# Patient Record
Sex: MALE | Race: WHITE | NOT HISPANIC OR LATINO | Employment: OTHER | URBAN - METROPOLITAN AREA
[De-identification: names, ages, dates, MRNs, and addresses within clinical notes are randomized per-mention and may not be internally consistent; named-entity substitution may affect disease eponyms.]

---

## 2021-03-03 DIAGNOSIS — Z23 NEED FOR VACCINATION: ICD-10-CM

## 2022-08-28 ENCOUNTER — APPOINTMENT (OUTPATIENT)
Dept: RADIOLOGY | Facility: MEDICAL CENTER | Age: 72
DRG: 871 | End: 2022-08-28
Attending: EMERGENCY MEDICINE
Payer: MEDICARE

## 2022-08-28 ENCOUNTER — HOSPITAL ENCOUNTER (INPATIENT)
Facility: MEDICAL CENTER | Age: 72
LOS: 2 days | DRG: 871 | End: 2022-08-30
Attending: EMERGENCY MEDICINE | Admitting: INTERNAL MEDICINE
Payer: MEDICARE

## 2022-08-28 ENCOUNTER — APPOINTMENT (OUTPATIENT)
Dept: RADIOLOGY | Facility: MEDICAL CENTER | Age: 72
DRG: 871 | End: 2022-08-28
Attending: INTERNAL MEDICINE
Payer: MEDICARE

## 2022-08-28 DIAGNOSIS — R65.20 SEPSIS WITH ENCEPHALOPATHY WITHOUT SEPTIC SHOCK, DUE TO UNSPECIFIED ORGANISM (HCC): ICD-10-CM

## 2022-08-28 DIAGNOSIS — I20.0 UNSTABLE ANGINA (HCC): ICD-10-CM

## 2022-08-28 DIAGNOSIS — A41.9 SEPSIS WITH ENCEPHALOPATHY WITHOUT SEPTIC SHOCK, DUE TO UNSPECIFIED ORGANISM (HCC): ICD-10-CM

## 2022-08-28 DIAGNOSIS — T79.6XXA TRAUMATIC RHABDOMYOLYSIS, INITIAL ENCOUNTER (HCC): ICD-10-CM

## 2022-08-28 DIAGNOSIS — G93.40 ENCEPHALOPATHY: ICD-10-CM

## 2022-08-28 DIAGNOSIS — G93.40 ACUTE ENCEPHALOPATHY: ICD-10-CM

## 2022-08-28 DIAGNOSIS — F10.11 HISTORY OF ALCOHOL ABUSE: ICD-10-CM

## 2022-08-28 DIAGNOSIS — E86.0 DEHYDRATION: ICD-10-CM

## 2022-08-28 DIAGNOSIS — G93.41 SEPSIS WITH ENCEPHALOPATHY WITHOUT SEPTIC SHOCK, DUE TO UNSPECIFIED ORGANISM (HCC): ICD-10-CM

## 2022-08-28 PROBLEM — M62.82 RHABDOMYOLYSIS: Status: ACTIVE | Noted: 2022-08-28

## 2022-08-28 LAB
ALBUMIN SERPL BCP-MCNC: 4.7 G/DL (ref 3.2–4.9)
ALBUMIN/GLOB SERPL: 1.6 G/DL
ALP SERPL-CCNC: 48 U/L (ref 30–99)
ALT SERPL-CCNC: 39 U/L (ref 2–50)
AMMONIA PLAS-SCNC: 13 UMOL/L (ref 11–45)
ANION GAP SERPL CALC-SCNC: 22 MMOL/L (ref 7–16)
ANISOCYTOSIS BLD QL SMEAR: ABNORMAL
AST SERPL-CCNC: 61 U/L (ref 12–45)
BASOPHILS # BLD AUTO: 0 % (ref 0–1.8)
BASOPHILS # BLD: 0 K/UL (ref 0–0.12)
BILIRUB SERPL-MCNC: 0.7 MG/DL (ref 0.1–1.5)
BUN SERPL-MCNC: 28 MG/DL (ref 8–22)
CALCIUM SERPL-MCNC: 9.9 MG/DL (ref 8.5–10.5)
CHLORIDE SERPL-SCNC: 106 MMOL/L (ref 96–112)
CK SERPL-CCNC: 972 U/L (ref 0–154)
CO2 SERPL-SCNC: 15 MMOL/L (ref 20–33)
CREAT SERPL-MCNC: 1.18 MG/DL (ref 0.5–1.4)
EKG IMPRESSION: NORMAL
EOSINOPHIL # BLD AUTO: 0 K/UL (ref 0–0.51)
EOSINOPHIL NFR BLD: 0 % (ref 0–6.9)
ERYTHROCYTE [DISTWIDTH] IN BLOOD BY AUTOMATED COUNT: 50.5 FL (ref 35.9–50)
ETHANOL BLD-MCNC: 14.7 MG/DL
FLUAV RNA SPEC QL NAA+PROBE: POSITIVE
FLUBV RNA SPEC QL NAA+PROBE: NEGATIVE
GFR SERPLBLD CREATININE-BSD FMLA CKD-EPI: 66 ML/MIN/1.73 M 2
GLOBULIN SER CALC-MCNC: 2.9 G/DL (ref 1.9–3.5)
GLUCOSE SERPL-MCNC: 111 MG/DL (ref 65–99)
HCT VFR BLD AUTO: 41.5 % (ref 42–52)
HGB BLD-MCNC: 14.2 G/DL (ref 14–18)
LACTATE SERPL-SCNC: 2.2 MMOL/L (ref 0.5–2)
LACTATE SERPL-SCNC: 2.6 MMOL/L (ref 0.5–2)
LYMPHOCYTES # BLD AUTO: 6.46 K/UL (ref 1–4.8)
LYMPHOCYTES NFR BLD: 38.7 % (ref 22–41)
MACROCYTES BLD QL SMEAR: ABNORMAL
MANUAL DIFF BLD: NORMAL
MCH RBC QN AUTO: 33.6 PG (ref 27–33)
MCHC RBC AUTO-ENTMCNC: 34.2 G/DL (ref 33.7–35.3)
MCV RBC AUTO: 98.1 FL (ref 81.4–97.8)
MONOCYTES # BLD AUTO: 1.82 K/UL (ref 0–0.85)
MONOCYTES NFR BLD AUTO: 10.9 % (ref 0–13.4)
MORPHOLOGY BLD-IMP: NORMAL
NEUTROPHILS # BLD AUTO: 8.42 K/UL (ref 1.82–7.42)
NEUTROPHILS NFR BLD: 50.4 % (ref 44–72)
NRBC # BLD AUTO: 0 K/UL
NRBC BLD-RTO: 0 /100 WBC
PLATELET # BLD AUTO: 257 K/UL (ref 164–446)
PLATELET BLD QL SMEAR: NORMAL
PMV BLD AUTO: 9.7 FL (ref 9–12.9)
POTASSIUM SERPL-SCNC: 4.3 MMOL/L (ref 3.6–5.5)
PROCALCITONIN SERPL-MCNC: 0.18 NG/ML
PROT SERPL-MCNC: 7.6 G/DL (ref 6–8.2)
RBC # BLD AUTO: 4.23 M/UL (ref 4.7–6.1)
RBC BLD AUTO: PRESENT
RSV RNA SPEC QL NAA+PROBE: POSITIVE
SARS-COV-2 RNA RESP QL NAA+PROBE: NOTDETECTED
SMUDGE CELLS BLD QL SMEAR: NORMAL
SODIUM SERPL-SCNC: 143 MMOL/L (ref 135–145)
SPECIMEN SOURCE: ABNORMAL
TROPONIN T SERPL-MCNC: 30 NG/L (ref 6–19)
TROPONIN T SERPL-MCNC: 41 NG/L (ref 6–19)
VIT B12 SERPL-MCNC: 307 PG/ML (ref 211–911)
WBC # BLD AUTO: 16.7 K/UL (ref 4.8–10.8)

## 2022-08-28 PROCEDURE — 93005 ELECTROCARDIOGRAM TRACING: CPT | Performed by: INTERNAL MEDICINE

## 2022-08-28 PROCEDURE — 87040 BLOOD CULTURE FOR BACTERIA: CPT

## 2022-08-28 PROCEDURE — 82550 ASSAY OF CK (CPK): CPT

## 2022-08-28 PROCEDURE — 700111 HCHG RX REV CODE 636 W/ 250 OVERRIDE (IP): Performed by: INTERNAL MEDICINE

## 2022-08-28 PROCEDURE — 36415 COLL VENOUS BLD VENIPUNCTURE: CPT

## 2022-08-28 PROCEDURE — 700111 HCHG RX REV CODE 636 W/ 250 OVERRIDE (IP): Performed by: EMERGENCY MEDICINE

## 2022-08-28 PROCEDURE — 83605 ASSAY OF LACTIC ACID: CPT | Mod: 91

## 2022-08-28 PROCEDURE — 96366 THER/PROPH/DIAG IV INF ADDON: CPT

## 2022-08-28 PROCEDURE — 80053 COMPREHEN METABOLIC PANEL: CPT

## 2022-08-28 PROCEDURE — 70450 CT HEAD/BRAIN W/O DYE: CPT

## 2022-08-28 PROCEDURE — HZ2ZZZZ DETOXIFICATION SERVICES FOR SUBSTANCE ABUSE TREATMENT: ICD-10-PCS | Performed by: EMERGENCY MEDICINE

## 2022-08-28 PROCEDURE — 96365 THER/PROPH/DIAG IV INF INIT: CPT

## 2022-08-28 PROCEDURE — 82607 VITAMIN B-12: CPT

## 2022-08-28 PROCEDURE — 770001 HCHG ROOM/CARE - MED/SURG/GYN PRIV*

## 2022-08-28 PROCEDURE — 99285 EMERGENCY DEPT VISIT HI MDM: CPT

## 2022-08-28 PROCEDURE — 96375 TX/PRO/DX INJ NEW DRUG ADDON: CPT

## 2022-08-28 PROCEDURE — 0241U HCHG SARS-COV-2 COVID-19 NFCT DS RESP RNA 4 TRGT MIC: CPT

## 2022-08-28 PROCEDURE — 96372 THER/PROPH/DIAG INJ SC/IM: CPT

## 2022-08-28 PROCEDURE — 700105 HCHG RX REV CODE 258: Performed by: INTERNAL MEDICINE

## 2022-08-28 PROCEDURE — 96367 TX/PROPH/DG ADDL SEQ IV INF: CPT

## 2022-08-28 PROCEDURE — C9803 HOPD COVID-19 SPEC COLLECT: HCPCS | Performed by: INTERNAL MEDICINE

## 2022-08-28 PROCEDURE — 85025 COMPLETE CBC W/AUTO DIFF WBC: CPT

## 2022-08-28 PROCEDURE — 80503 PATH CLIN CONSLTJ SF 5-20: CPT

## 2022-08-28 PROCEDURE — 84145 PROCALCITONIN (PCT): CPT

## 2022-08-28 PROCEDURE — 82140 ASSAY OF AMMONIA: CPT

## 2022-08-28 PROCEDURE — 85007 BL SMEAR W/DIFF WBC COUNT: CPT

## 2022-08-28 PROCEDURE — 82077 ASSAY SPEC XCP UR&BREATH IA: CPT

## 2022-08-28 PROCEDURE — 99223 1ST HOSP IP/OBS HIGH 75: CPT | Mod: AI | Performed by: INTERNAL MEDICINE

## 2022-08-28 PROCEDURE — 700101 HCHG RX REV CODE 250: Performed by: EMERGENCY MEDICINE

## 2022-08-28 PROCEDURE — 84484 ASSAY OF TROPONIN QUANT: CPT | Mod: 91

## 2022-08-28 PROCEDURE — 71045 X-RAY EXAM CHEST 1 VIEW: CPT

## 2022-08-28 RX ORDER — LORAZEPAM 2 MG/ML
1 INJECTION INTRAMUSCULAR ONCE
Status: COMPLETED | OUTPATIENT
Start: 2022-08-28 | End: 2022-08-28

## 2022-08-28 RX ORDER — LORAZEPAM 2 MG/1
4 TABLET ORAL
Status: DISCONTINUED | OUTPATIENT
Start: 2022-08-28 | End: 2022-08-30

## 2022-08-28 RX ORDER — BUDESONIDE AND FORMOTEROL FUMARATE DIHYDRATE 160; 4.5 UG/1; UG/1
2 AEROSOL RESPIRATORY (INHALATION) 2 TIMES DAILY
Status: DISCONTINUED | OUTPATIENT
Start: 2022-08-29 | End: 2022-08-30 | Stop reason: HOSPADM

## 2022-08-28 RX ORDER — LABETALOL HYDROCHLORIDE 5 MG/ML
10 INJECTION, SOLUTION INTRAVENOUS EVERY 4 HOURS PRN
Status: DISCONTINUED | OUTPATIENT
Start: 2022-08-28 | End: 2022-08-30 | Stop reason: HOSPADM

## 2022-08-28 RX ORDER — ENOXAPARIN SODIUM 100 MG/ML
40 INJECTION SUBCUTANEOUS DAILY
Status: DISCONTINUED | OUTPATIENT
Start: 2022-08-28 | End: 2022-08-30

## 2022-08-28 RX ORDER — BUDESONIDE AND FORMOTEROL FUMARATE DIHYDRATE 160; 4.5 UG/1; UG/1
2 AEROSOL RESPIRATORY (INHALATION) 2 TIMES DAILY
COMMUNITY
Start: 2022-07-27

## 2022-08-28 RX ORDER — FOLIC ACID 1 MG/1
1 TABLET ORAL DAILY
Status: DISCONTINUED | OUTPATIENT
Start: 2022-08-29 | End: 2022-08-30 | Stop reason: HOSPADM

## 2022-08-28 RX ORDER — ONDANSETRON 4 MG/1
4 TABLET, ORALLY DISINTEGRATING ORAL EVERY 4 HOURS PRN
Status: DISCONTINUED | OUTPATIENT
Start: 2022-08-28 | End: 2022-08-30 | Stop reason: HOSPADM

## 2022-08-28 RX ORDER — ONDANSETRON 2 MG/ML
4 INJECTION INTRAMUSCULAR; INTRAVENOUS EVERY 4 HOURS PRN
Status: DISCONTINUED | OUTPATIENT
Start: 2022-08-28 | End: 2022-08-30 | Stop reason: HOSPADM

## 2022-08-28 RX ORDER — SODIUM CHLORIDE, SODIUM LACTATE, POTASSIUM CHLORIDE, AND CALCIUM CHLORIDE .6; .31; .03; .02 G/100ML; G/100ML; G/100ML; G/100ML
1000 INJECTION, SOLUTION INTRAVENOUS
Status: DISCONTINUED | OUTPATIENT
Start: 2022-08-28 | End: 2022-08-30

## 2022-08-28 RX ORDER — BISACODYL 10 MG
10 SUPPOSITORY, RECTAL RECTAL
Status: DISCONTINUED | OUTPATIENT
Start: 2022-08-28 | End: 2022-08-30 | Stop reason: HOSPADM

## 2022-08-28 RX ORDER — LORAZEPAM 1 MG/1
1 TABLET ORAL EVERY 4 HOURS PRN
Status: DISCONTINUED | OUTPATIENT
Start: 2022-08-28 | End: 2022-08-30

## 2022-08-28 RX ORDER — ACETAMINOPHEN 325 MG/1
650 TABLET ORAL EVERY 6 HOURS PRN
Status: DISCONTINUED | OUTPATIENT
Start: 2022-08-28 | End: 2022-08-30 | Stop reason: HOSPADM

## 2022-08-28 RX ORDER — LORAZEPAM 0.5 MG/1
0.5 TABLET ORAL EVERY 4 HOURS PRN
Status: DISCONTINUED | OUTPATIENT
Start: 2022-08-28 | End: 2022-08-30

## 2022-08-28 RX ORDER — AMOXICILLIN 250 MG
2 CAPSULE ORAL 2 TIMES DAILY
Status: DISCONTINUED | OUTPATIENT
Start: 2022-08-29 | End: 2022-08-30 | Stop reason: HOSPADM

## 2022-08-28 RX ORDER — SODIUM CHLORIDE, SODIUM LACTATE, POTASSIUM CHLORIDE, CALCIUM CHLORIDE 600; 310; 30; 20 MG/100ML; MG/100ML; MG/100ML; MG/100ML
INJECTION, SOLUTION INTRAVENOUS CONTINUOUS
Status: DISCONTINUED | OUTPATIENT
Start: 2022-08-28 | End: 2022-08-30

## 2022-08-28 RX ORDER — POLYETHYLENE GLYCOL 3350 17 G/17G
1 POWDER, FOR SOLUTION ORAL
Status: DISCONTINUED | OUTPATIENT
Start: 2022-08-28 | End: 2022-08-30 | Stop reason: HOSPADM

## 2022-08-28 RX ORDER — LORAZEPAM 2 MG/1
2 TABLET ORAL
Status: DISCONTINUED | OUTPATIENT
Start: 2022-08-28 | End: 2022-08-30

## 2022-08-28 RX ORDER — DIAZEPAM 5 MG/ML
5 INJECTION, SOLUTION INTRAMUSCULAR; INTRAVENOUS
Status: DISCONTINUED | OUTPATIENT
Start: 2022-08-28 | End: 2022-08-30

## 2022-08-28 RX ORDER — LOSARTAN POTASSIUM 50 MG/1
100 TABLET ORAL DAILY
Status: DISCONTINUED | OUTPATIENT
Start: 2022-08-29 | End: 2022-08-30 | Stop reason: HOSPADM

## 2022-08-28 RX ADMIN — LORAZEPAM 1 MG: 2 INJECTION INTRAMUSCULAR; INTRAVENOUS at 16:22

## 2022-08-28 RX ADMIN — THIAMINE HYDROCHLORIDE: 100 INJECTION, SOLUTION INTRAMUSCULAR; INTRAVENOUS at 16:22

## 2022-08-28 RX ADMIN — SODIUM CHLORIDE, POTASSIUM CHLORIDE, SODIUM LACTATE AND CALCIUM CHLORIDE: 600; 310; 30; 20 INJECTION, SOLUTION INTRAVENOUS at 20:18

## 2022-08-28 RX ADMIN — ENOXAPARIN SODIUM 40 MG: 40 INJECTION SUBCUTANEOUS at 19:13

## 2022-08-28 RX ADMIN — THIAMINE HYDROCHLORIDE 250 MG: 100 INJECTION, SOLUTION INTRAMUSCULAR; INTRAVENOUS at 19:10

## 2022-08-28 RX ADMIN — SODIUM CHLORIDE 3 G: 900 INJECTION INTRAVENOUS at 18:33

## 2022-08-28 ASSESSMENT — LIFESTYLE VARIABLES
ON A TYPICAL DAY WHEN YOU DRINK ALCOHOL HOW MANY DRINKS DO YOU HAVE: 5
EVER FELT BAD OR GUILTY ABOUT YOUR DRINKING: NO
AVERAGE NUMBER OF DAYS PER WEEK YOU HAVE A DRINK CONTAINING ALCOHOL: 7
TOTAL SCORE: 0
AUDITORY DISTURBANCES: NOT PRESENT
TOTAL SCORE: 6
HAVE YOU EVER FELT YOU SHOULD CUT DOWN ON YOUR DRINKING: NO
CONSUMPTION TOTAL: POSITIVE
NAUSEA AND VOMITING: NO NAUSEA AND NO VOMITING
DO YOU DRINK ALCOHOL: YES
DOES PATIENT WANT TO STOP DRINKING: NO
HEADACHE, FULLNESS IN HEAD: NOT PRESENT
ANXIETY: NO ANXIETY (AT EASE)
VISUAL DISTURBANCES: NOT PRESENT
HOW MANY TIMES IN THE PAST YEAR HAVE YOU HAD 5 OR MORE DRINKS IN A DAY: 5
ORIENTATION AND CLOUDING OF SENSORIUM: DISORIENTED FOR PLACE AND / OR PERSON
PAROXYSMAL SWEATS: NO SWEAT VISIBLE
AGITATION: NORMAL ACTIVITY
HAVE PEOPLE ANNOYED YOU BY CRITICIZING YOUR DRINKING: NO
EVER HAD A DRINK FIRST THING IN THE MORNING TO STEADY YOUR NERVES TO GET RID OF A HANGOVER: NO
TREMOR: *
TOTAL SCORE: 0
TOTAL SCORE: 0

## 2022-08-28 NOTE — ED PROVIDER NOTES
"ED Provider Note    CHIEF COMPLAINT  Chief Complaint   Patient presents with    ALOC     Pt was BIBA from home after son found him down between his couch and the wall. The last time the pt was seen normal was Monday, daughter spoke with pt on Tuesday on the phone. Per son pt \"wasn't making sense.\" Pt A+ox4 on arrival but talking about \"gang bangers that broke in his sons house.\" Pt has hx of alcohol abuse and COPD.     Open Wound     Pt has sacral wound and states that its been an issue ' for awhile\"        HPI  Ady Blake is a 72 y.o. male who presents altered, had not been heard from by his family for the past 5 days, his son went to his residence where he found him on the floor between the couch and the wall.  He was conscious, had urinated, no bowel incontinence noted.  His son reports he seemed very confused, he was telling stories and saying things that were not true.  Patient offers no complaints whatsoever, no headache or neck pain or back pain or chest pain, no abdominal pain.  The patient really cannot articulate why he is here and at this time history is otherwise unobtainable.  The son reports that he is a chronic alcoholic.    REVIEW OF SYSTEMS  Negative for fever, rash, chest pain, dyspnea, abdominal pain, nausea, vomiting, diarrhea, headache, focal weakness, focal numbness, focal tingling, back pain. All other systems are negative.  Unreliable    PAST MEDICAL HISTORY  Past Medical History:   Diagnosis Date    Arthritis     knees and ankles    Back pain     Chronic airway obstruction, not elsewhere classified     Dental disorder     upper and lower    Glaucoma     Hiatus hernia syndrome     HTN (hypertension) 6/14/2015    Myocardial infarct (HCC)     Unstable angina (HCC) 6/14/2015       FAMILY HISTORY  History reviewed. No pertinent family history.    SOCIAL HISTORY  Social History     Tobacco Use    Smoking status: Every Day     Packs/day: 1.00     Years: 40.00     Pack years: 40.00     " "Types: Cigarettes    Smokeless tobacco: Never   Substance Use Topics    Alcohol use: Yes     Comment: \"BEER ALOT PLUS SCOTCH 4 PER WK\"    Drug use: Yes     Comment: \"POT ONCE IN A WHILE\"       SURGICAL HISTORY  Past Surgical History:   Procedure Laterality Date    INGUINAL HERNIA LAPAROSCOPIC  11/10/2014    Performed by John H Ganser, M.D. [facility]    SHAYY BY LAPAROSCOPY      TONSILLECTOMY         CURRENT MEDICATIONS  I personally reviewed the medication list in the charting documentation.     ALLERGIES  No Known Allergies    MEDICAL RECORD  I have reviewed patient's medical record and pertinent results are listed above.      PHYSICAL EXAM  VITAL SIGNS: BP (!) 143/96   Pulse 96   Temp 36.7 °C (98.1 °F) (Temporal)   Resp 18   Ht 1.651 m (5' 5\")   Wt 61.2 kg (135 lb)   SpO2 97%   BMI 22.47 kg/m²    Constitutional: Awake and alert, disheveled, no acute distress, appears chronically ill  HENT: Normocephalic, no obvious evidence of acute trauma.  Eyes: No scleral icterus. Normal conjunctiva   Neck: Comfortable movement without any obvious restriction in the range of motion.  Cardiovascular: Upon ascultation I appreciate a regular heart rhythm and a normal rate.   Thorax & Lungs: Normal nonlabored respirations.  Upon application of the stethoscope for auscultation I find there to be no associated chest wall tenderness.  I appreciate no wheezing, rhonchi or rales. There is normal air movement.    Abdomen: The abdomen is not visibly distended. Upon palpation, I find it to be without tenderness.  No mass appreciated.  Skin: Inspection of the skin overlying the low back reveals a very superficial erosion overlying the sacrum.  Minimal surrounding erythema.  No drainage.  Extremities/Musculoskeletal: Abrasion to the left knee.  No obvious asymmetric edema of the lower extremities  Neurologic: Awake and alert.  Disoriented.  Normal and symmetric upper and lower extremity motor and sensory function " bilaterally    DIAGNOSTIC STUDIES / PROCEDURES    LABS/EKGs  Results for orders placed or performed during the hospital encounter of 08/28/22   CBC With Differential   Result Value Ref Range    WBC 16.7 (H) 4.8 - 10.8 K/uL    RBC 4.23 (L) 4.70 - 6.10 M/uL    Hemoglobin 14.2 14.0 - 18.0 g/dL    Hematocrit 41.5 (L) 42.0 - 52.0 %    MCV 98.1 (H) 81.4 - 97.8 fL    MCH 33.6 (H) 27.0 - 33.0 pg    MCHC 34.2 33.7 - 35.3 g/dL    RDW 50.5 (H) 35.9 - 50.0 fL    Platelet Count 257 164 - 446 K/uL    MPV 9.7 9.0 - 12.9 fL    Neutrophils-Polys 50.40 44.00 - 72.00 %    Lymphocytes 38.70 22.00 - 41.00 %    Monocytes 10.90 0.00 - 13.40 %    Eosinophils 0.00 0.00 - 6.90 %    Basophils 0.00 0.00 - 1.80 %    Nucleated RBC 0.00 /100 WBC    Neutrophils (Absolute) 8.42 (H) 1.82 - 7.42 K/uL    Lymphs (Absolute) 6.46 (H) 1.00 - 4.80 K/uL    Monos (Absolute) 1.82 (H) 0.00 - 0.85 K/uL    Eos (Absolute) 0.00 0.00 - 0.51 K/uL    Baso (Absolute) 0.00 0.00 - 0.12 K/uL    NRBC (Absolute) 0.00 K/uL    Anisocytosis 1+     Macrocytosis 1+    Comp Metabolic Panel   Result Value Ref Range    Sodium 143 135 - 145 mmol/L    Potassium 4.3 3.6 - 5.5 mmol/L    Chloride 106 96 - 112 mmol/L    Co2 15 (L) 20 - 33 mmol/L    Anion Gap 22.0 (H) 7.0 - 16.0    Glucose 111 (H) 65 - 99 mg/dL    Bun 28 (H) 8 - 22 mg/dL    Creatinine 1.18 0.50 - 1.40 mg/dL    Calcium 9.9 8.5 - 10.5 mg/dL    AST(SGOT) 61 (H) 12 - 45 U/L    ALT(SGPT) 39 2 - 50 U/L    Alkaline Phosphatase 48 30 - 99 U/L    Total Bilirubin 0.7 0.1 - 1.5 mg/dL    Albumin 4.7 3.2 - 4.9 g/dL    Total Protein 7.6 6.0 - 8.2 g/dL    Globulin 2.9 1.9 - 3.5 g/dL    A-G Ratio 1.6 g/dL   Lactic Acid   Result Value Ref Range    Lactic Acid 2.6 (H) 0.5 - 2.0 mmol/L   Troponin   Result Value Ref Range    Troponin T 41 (H) 6 - 19 ng/L   CREATINE KINASE   Result Value Ref Range    CPK Total 972 (H) 0 - 154 U/L   DIAGNOSTIC ALCOHOL   Result Value Ref Range    Diagnostic Alcohol 14.7 (H) <10.1 mg/dL   ESTIMATED GFR   Result  Value Ref Range    GFR (CKD-EPI) 66 >60 mL/min/1.73 m 2   PLATELET ESTIMATE   Result Value Ref Range    Plt Estimation Normal    MORPHOLOGY   Result Value Ref Range    RBC Morphology Present     Smudge Cells Moderate    PERIPHERAL SMEAR REVIEW   Result Value Ref Range    Peripheral Smear Review see below    DIFFERENTIAL MANUAL   Result Value Ref Range    Manual Diff Status PERFORMED         RADIOLOGY  DX-CHEST-LIMITED (1 VIEW)   Final Result      1.  No acute cardiac or pulmonary abnormalities are identified.      CT-HEAD W/O   Final Result         1. No acute intracranial abnormality. No evidence of acute intracranial hemorrhage or mass lesion.                           COURSE & MEDICAL DECISION MAKING  I have reviewed any medical record information, laboratory studies and radiographic results as noted above.  Differential diagnoses includes: Encephalopathy, intoxication, ICH, dehydration, lites abnormalities, anemia rhabdomyolysis    Encounter Summary: This is a very pleasant 72 y.o. male who unfortunately required evaluation in the emergency department today with apparent delirium, chronic alcoholic, found on the floor by his son after not being heard from in about 5 days.  On exam he is clearly delirious, he seems to be confabulating, really has no evidence of acute trauma, no other focal findings on exam other than a area of superficial breakdown overlying his sacrum.  Head CT will be obtained.  Blood work will be obtained.  We will treated with a detox bag including thiamine as I am concerned about the possibility of Warnicke's encephalopathy.  Ultimately the patient will require hospitalization for further evaluation and care ------- the evaluation here reveals a mild elevation in the alcohol level, leukocytosis, minimal troponin elevation, and elevation in the BUN as well as a essentially mild elevation in his CPK.  At this point, he has being treated with a detox bag, he is being admitted to the hospital  for further evaluation      DISPOSITION: Admit in guarded condition      FINAL IMPRESSION  1. Acute encephalopathy    2. Dehydration    3. History of alcohol abuse           This dictation was created using voice recognition software. The accuracy of the dictation is limited to the abilities of the software. I expect there may be some errors of grammar and possibly content. The nursing notes were reviewed and certain aspects of this information were incorporated into this note.    Electronically signed by: Raymond Georges M.D., 8/28/2022 3:30 PM

## 2022-08-28 NOTE — ED NOTES
Med rec updated and complete. Allergies reviewed.  Per phone call to home pharmacy and interview with family at bedside.  Family is unsure of last doses taken. Pt is unable to participate in an interview at this time.        Home pharmacy [facility]  [phone]

## 2022-08-28 NOTE — ED TRIAGE NOTES
"Chief Complaint   Patient presents with    ALOC     Pt was BIBA from home after son found him down between his couch and the wall. The last time the pt was seen normal was Monday, daughter spoke with pt on Tuesday on the phone. Per son pt \"wasn't making sense.\" Pt A+ox4 on arrival but talking about \"gang bangers that broke in his sons house.\" Pt has hx of alcohol abuse and COPD.     Open Wound     Pt has sacral wound and states that its been an issue ' for awhile\"          Pt BIBA from home for above complaint. Pt A+Ox4 on arrival. EMS did not give any medications.     BP (!) 143/96   Pulse 96   Temp 36.7 °C (98.1 °F) (Temporal)   Resp 18   Ht 1.651 m (5' 5\")   Wt 61.2 kg (135 lb)   SpO2 97%     "

## 2022-08-28 NOTE — H&P
"Hospital Medicine History & Physical Note    Date of Service  8/28/2022    Primary Care Physician  Mark Benito P.A.-C.    Consultants  None    Code Status  Full    Chief Complaint  Chief Complaint   Patient presents with    ALOC     Pt was BIBA from home after son found him down between his couch and the wall. The last time the pt was seen normal was Monday, daughter spoke with pt on Tuesday on the phone. Per son pt \"wasn't making sense.\" Pt A+ox4 on arrival but talking about \"gang bangers that broke in his sons house.\" Pt has hx of alcohol abuse and COPD.     Open Wound     Pt has sacral wound and states that its been an issue ' for awhile\"        History of Presenting Illness  Ady Blake is a 72 y.o. male who presented 8/28/2022 with alcohol abuse and COPD who was found down by family with confusion. He was telling them stories that did not make sense.  Labs show mild rhabdo. Alcohol level is positive. CTH neg for acute pathology  Patient is asleep at bedside, he as given ativan.  Daughter and son said they last spoke with him Tuesday and he was fine then. Patient did mention to them he feels like his equilibrium is off. He \"drinks beer like water\" as well as shots. He does not go any day without drinking.    I discussed the plan of care with family.    Review of Systems  Review of Systems   Unable to perform ROS: Medical condition (confused, somnolent)     Past Medical History   has a past medical history of Arthritis, Back pain, Chronic airway obstruction, not elsewhere classified, Dental disorder, Glaucoma, Hiatus hernia syndrome, HTN (hypertension) (6/14/2015), Myocardial infarct (HCC), and Unstable angina (HCC) (6/14/2015).    Surgical History   has a past surgical history that includes tonsillectomy; emily by laparoscopy; and inguinal hernia laparoscopic (11/10/2014).     Family History  Mom - DM    Social History   reports that he has been smoking cigarettes. He has a 40.00 pack-year smoking " history. He has never used smokeless tobacco. He reports current alcohol use. He reports current drug use.    Allergies  No Known Allergies    Medications  Prior to Admission Medications   Prescriptions Last Dose Informant Patient Reported? Taking?   B Complex Vitamins (VITAMIN B COMPLEX) TABS  Patient Yes No   Sig: Take 1 Tab by mouth every day.   albuterol (VENTOLIN OR PROVENTIL) 108 (90 BASE) MCG/ACT AERS inhalation aerosol  Patient Yes No   Sig: Inhale 2 Puffs by mouth every 6 hours as needed for Shortness of Breath.   glucosamine Sulfate 500 MG CAPS  Patient Yes No   Sig: Take 500 mg by mouth every day.   losartan (COZAAR) 100 MG TABS  Patient Yes No   Sig: Take 100 mg by mouth every day.   tiotropium (SPIRIVA HANDIHALER) 18 MCG CAPS  Patient Yes No   Sig: Inhale 18 mcg by mouth every day.      Facility-Administered Medications: None       Physical Exam  Temp:  [36.7 °C (98.1 °F)] 36.7 °C (98.1 °F)  Pulse:  [62-99] 62  Resp:  [16-18] 16  BP: (143-145)/(83-96) 145/83  SpO2:  [96 %-97 %] 96 %  Blood Pressure : (!) 145/83   Temperature: 36.7 °C (98.1 °F)   Pulse: 62   Respiration: 16   Pulse Oximetry: 97 %       Physical Exam  Constitutional:       Appearance: He is ill-appearing and diaphoretic.      Comments: drowsy   HENT:      Head: Normocephalic.      Mouth/Throat:      Mouth: Mucous membranes are dry.   Eyes:      General:         Right eye: No discharge.         Left eye: No discharge.      Pupils: Pupils are equal, round, and reactive to light.   Cardiovascular:      Rate and Rhythm: Normal rate and regular rhythm.   Pulmonary:      Breath sounds: Rhonchi present. No wheezing.      Comments: tachypneic  Abdominal:      General: There is no distension.      Palpations: Abdomen is soft.      Tenderness: There is no abdominal tenderness. There is no guarding or rebound.   Musculoskeletal:      Cervical back: Neck supple. No rigidity.      Right lower leg: No edema.      Left lower leg: No edema.   Skin:      General: Skin is warm.   Neurological:      Comments: Disoriented, does not follow commands, weakly moves UE and LE       Laboratory:  Recent Labs     08/28/22  1425   WBC 16.7*   RBC 4.23*   HEMOGLOBIN 14.2   HEMATOCRIT 41.5*   MCV 98.1*   MCH 33.6*   MCHC 34.2   RDW 50.5*   PLATELETCT 257   MPV 9.7     Recent Labs     08/28/22  1425   SODIUM 143   POTASSIUM 4.3   CHLORIDE 106   CO2 15*   GLUCOSE 111*   BUN 28*   CREATININE 1.18   CALCIUM 9.9     Recent Labs     08/28/22  1425   ALTSGPT 39   ASTSGOT 61*   ALKPHOSPHAT 48   TBILIRUBIN 0.7   GLUCOSE 111*         No results for input(s): NTPROBNP in the last 72 hours.      Recent Labs     08/28/22  1425   TROPONINT 41*       Imaging:  DX-CHEST-LIMITED (1 VIEW)   Final Result      1.  No acute cardiac or pulmonary abnormalities are identified.      CT-HEAD W/O   Final Result         1. No acute intracranial abnormality. No evidence of acute intracranial hemorrhage or mass lesion.                           Assessment/Plan:  Justification for Admission Status  I anticipate this patient will require at least two midnights for appropriate medical management, necessitating inpatient admission because encephalopathy and sepsis workup    Patient will need a  bed on EMERGENCY service .  The need is secondary to sepsis.    * Encephalopathy- (present on admission)  Assessment & Plan  Metabolic  CTH neg for acute pathology  Is intoxicated, CIWA protocol ordered  Check Vit B1 and B12, ammonia level  Workup for sepsis  Possible confabulation? IV thiamine ordered  Consider MRI brain if he does not improve    Rhabdomyolysis  Assessment & Plan  Renal function ok  Trend CPK, IVF ordered    Sepsis (HCC)  Assessment & Plan  This is Sepsis Present on admission  SIRS criteria identified on my evaluation include: Fever, with temperature greater than 101 deg F and Tachycardia, with heart rate greater than 90 BPM  Source is urine vs lung. No sign of meningitis/rigid neck  Sepsis protocol  initiated  Fluid resuscitation ordered per protocol  Crystalloid Fluid Administration: Fluid resuscitation ordered per standard protocol - 30 mL/kg per current or ideal body weight  IV antibiotics as appropriate for source of sepsis  Reassessment: I have reassessed the patient's hemodynamic status  Lactic 2.6, repeat pending  Check Blood and urine cultures  CXR is neg but rales on exam, possible aspiration. Screen for COVID/flu  Unasyn ordered    COPD (chronic obstructive pulmonary disease) (HCC)- (present on admission)  Assessment & Plan  Cont spiriva and symbicort    Alcohol abuse- (present on admission)  Assessment & Plan  High risk for alcohol withdrawal  CIWA  Folate, MVI, thiamine    HTN (hypertension)- (present on admission)  Assessment & Plan  Hypertensive  Cont losartan when able  IV PRN ordered      VTE prophylaxis: enoxaparin ppx

## 2022-08-29 PROBLEM — J40 BRONCHITIS: Status: ACTIVE | Noted: 2022-08-29

## 2022-08-29 PROBLEM — F17.200 TOBACCO DEPENDENCE: Status: ACTIVE | Noted: 2022-08-29

## 2022-08-29 LAB
ALBUMIN SERPL BCP-MCNC: 3.5 G/DL (ref 3.2–4.9)
ALBUMIN/GLOB SERPL: 1.5 G/DL
ALP SERPL-CCNC: 39 U/L (ref 30–99)
ALT SERPL-CCNC: 32 U/L (ref 2–50)
ANION GAP SERPL CALC-SCNC: 14 MMOL/L (ref 7–16)
ANISOCYTOSIS BLD QL SMEAR: ABNORMAL
AST SERPL-CCNC: 42 U/L (ref 12–45)
BASOPHILS # BLD AUTO: 0 % (ref 0–1.8)
BASOPHILS # BLD: 0 K/UL (ref 0–0.12)
BILIRUB SERPL-MCNC: 0.7 MG/DL (ref 0.1–1.5)
BUN SERPL-MCNC: 24 MG/DL (ref 8–22)
CALCIUM SERPL-MCNC: 9 MG/DL (ref 8.5–10.5)
CHLORIDE SERPL-SCNC: 108 MMOL/L (ref 96–112)
CK SERPL-CCNC: 650 U/L (ref 0–154)
CO2 SERPL-SCNC: 21 MMOL/L (ref 20–33)
CREAT SERPL-MCNC: 0.83 MG/DL (ref 0.5–1.4)
EOSINOPHIL # BLD AUTO: 0.11 K/UL (ref 0–0.51)
EOSINOPHIL NFR BLD: 0.9 % (ref 0–6.9)
ERYTHROCYTE [DISTWIDTH] IN BLOOD BY AUTOMATED COUNT: 50.4 FL (ref 35.9–50)
GFR SERPLBLD CREATININE-BSD FMLA CKD-EPI: 93 ML/MIN/1.73 M 2
GLOBULIN SER CALC-MCNC: 2.4 G/DL (ref 1.9–3.5)
GLUCOSE SERPL-MCNC: 89 MG/DL (ref 65–99)
HCT VFR BLD AUTO: 37.7 % (ref 42–52)
HGB BLD-MCNC: 12.9 G/DL (ref 14–18)
LYMPHOCYTES # BLD AUTO: 6.46 K/UL (ref 1–4.8)
LYMPHOCYTES NFR BLD: 51.3 % (ref 22–41)
MACROCYTES BLD QL SMEAR: ABNORMAL
MANUAL DIFF BLD: NORMAL
MCH RBC QN AUTO: 33.8 PG (ref 27–33)
MCHC RBC AUTO-ENTMCNC: 34.2 G/DL (ref 33.7–35.3)
MCV RBC AUTO: 98.7 FL (ref 81.4–97.8)
MICROCYTES BLD QL SMEAR: ABNORMAL
MONOCYTES # BLD AUTO: 0.66 K/UL (ref 0–0.85)
MONOCYTES NFR BLD AUTO: 5.2 % (ref 0–13.4)
MORPHOLOGY BLD-IMP: NORMAL
NEUTROPHILS # BLD AUTO: 5.37 K/UL (ref 1.82–7.42)
NEUTROPHILS NFR BLD: 42.6 % (ref 44–72)
NRBC # BLD AUTO: 0 K/UL
NRBC BLD-RTO: 0 /100 WBC
PATH REV: NORMAL
PATH REV: NORMAL
PLATELET # BLD AUTO: 201 K/UL (ref 164–446)
PLATELET BLD QL SMEAR: NORMAL
PMV BLD AUTO: 9.6 FL (ref 9–12.9)
POTASSIUM SERPL-SCNC: 3.5 MMOL/L (ref 3.6–5.5)
PROT SERPL-MCNC: 5.9 G/DL (ref 6–8.2)
RBC # BLD AUTO: 3.82 M/UL (ref 4.7–6.1)
RBC BLD AUTO: PRESENT
SMUDGE CELLS BLD QL SMEAR: NORMAL
SODIUM SERPL-SCNC: 143 MMOL/L (ref 135–145)
WBC # BLD AUTO: 12.6 K/UL (ref 4.8–10.8)

## 2022-08-29 PROCEDURE — 92610 EVALUATE SWALLOWING FUNCTION: CPT

## 2022-08-29 PROCEDURE — 36415 COLL VENOUS BLD VENIPUNCTURE: CPT

## 2022-08-29 PROCEDURE — A9270 NON-COVERED ITEM OR SERVICE: HCPCS | Performed by: INTERNAL MEDICINE

## 2022-08-29 PROCEDURE — 770001 HCHG ROOM/CARE - MED/SURG/GYN PRIV*

## 2022-08-29 PROCEDURE — 700102 HCHG RX REV CODE 250 W/ 637 OVERRIDE(OP): Performed by: INTERNAL MEDICINE

## 2022-08-29 PROCEDURE — 82550 ASSAY OF CK (CPK): CPT

## 2022-08-29 PROCEDURE — 85025 COMPLETE CBC W/AUTO DIFF WBC: CPT

## 2022-08-29 PROCEDURE — 700105 HCHG RX REV CODE 258: Performed by: INTERNAL MEDICINE

## 2022-08-29 PROCEDURE — 700111 HCHG RX REV CODE 636 W/ 250 OVERRIDE (IP): Performed by: INTERNAL MEDICINE

## 2022-08-29 PROCEDURE — 97166 OT EVAL MOD COMPLEX 45 MIN: CPT

## 2022-08-29 PROCEDURE — 51798 US URINE CAPACITY MEASURE: CPT

## 2022-08-29 PROCEDURE — 97162 PT EVAL MOD COMPLEX 30 MIN: CPT

## 2022-08-29 PROCEDURE — 80053 COMPREHEN METABOLIC PANEL: CPT

## 2022-08-29 PROCEDURE — 99233 SBSQ HOSP IP/OBS HIGH 50: CPT | Performed by: INTERNAL MEDICINE

## 2022-08-29 PROCEDURE — 85007 BL SMEAR W/DIFF WBC COUNT: CPT

## 2022-08-29 RX ORDER — OSELTAMIVIR PHOSPHATE 75 MG/1
75 CAPSULE ORAL 2 TIMES DAILY
Status: DISCONTINUED | OUTPATIENT
Start: 2022-08-29 | End: 2022-08-30 | Stop reason: HOSPADM

## 2022-08-29 RX ADMIN — THIAMINE HYDROCHLORIDE 250 MG: 100 INJECTION, SOLUTION INTRAMUSCULAR; INTRAVENOUS at 14:00

## 2022-08-29 RX ADMIN — DOCUSATE SODIUM 50 MG AND SENNOSIDES 8.6 MG 2 TABLET: 8.6; 5 TABLET, FILM COATED ORAL at 18:12

## 2022-08-29 RX ADMIN — SODIUM CHLORIDE 3 G: 900 INJECTION INTRAVENOUS at 18:13

## 2022-08-29 RX ADMIN — THERA TABS 1 TABLET: TAB at 06:10

## 2022-08-29 RX ADMIN — ENOXAPARIN SODIUM 40 MG: 40 INJECTION SUBCUTANEOUS at 18:12

## 2022-08-29 RX ADMIN — SODIUM CHLORIDE 3 G: 900 INJECTION INTRAVENOUS at 05:51

## 2022-08-29 RX ADMIN — SODIUM CHLORIDE 3 G: 900 INJECTION INTRAVENOUS at 00:46

## 2022-08-29 RX ADMIN — SODIUM CHLORIDE 3 G: 900 INJECTION INTRAVENOUS at 12:25

## 2022-08-29 RX ADMIN — SODIUM CHLORIDE, POTASSIUM CHLORIDE, SODIUM LACTATE AND CALCIUM CHLORIDE: 600; 310; 30; 20 INJECTION, SOLUTION INTRAVENOUS at 08:53

## 2022-08-29 RX ADMIN — LOSARTAN POTASSIUM 100 MG: 50 TABLET, FILM COATED ORAL at 06:09

## 2022-08-29 RX ADMIN — FOLIC ACID 1 MG: 1 TABLET ORAL at 06:10

## 2022-08-29 RX ADMIN — OSELTAMIVIR PHOSPHATE 75 MG: 75 CAPSULE ORAL at 12:34

## 2022-08-29 RX ADMIN — DOCUSATE SODIUM 50 MG AND SENNOSIDES 8.6 MG 2 TABLET: 8.6; 5 TABLET, FILM COATED ORAL at 06:11

## 2022-08-29 ASSESSMENT — LIFESTYLE VARIABLES
NAUSEA AND VOMITING: NO NAUSEA AND NO VOMITING
VISUAL DISTURBANCES: NOT PRESENT
ORIENTATION AND CLOUDING OF SENSORIUM: CANNOT DO SERIAL ADDITIONS OR IS UNCERTAIN ABOUT DATE
PAROXYSMAL SWEATS: NO SWEAT VISIBLE
ANXIETY: NO ANXIETY (AT EASE)
TREMOR: NO TREMOR
TOTAL SCORE: 4
PAROXYSMAL SWEATS: NO SWEAT VISIBLE
PAROXYSMAL SWEATS: NO SWEAT VISIBLE
NAUSEA AND VOMITING: NO NAUSEA AND NO VOMITING
PAROXYSMAL SWEATS: NO SWEAT VISIBLE
VISUAL DISTURBANCES: NOT PRESENT
ANXIETY: NO ANXIETY (AT EASE)
PAROXYSMAL SWEATS: NO SWEAT VISIBLE
VISUAL DISTURBANCES: NOT PRESENT
NAUSEA AND VOMITING: NO NAUSEA AND NO VOMITING
TOTAL SCORE: 3
HEADACHE, FULLNESS IN HEAD: NOT PRESENT
AGITATION: NORMAL ACTIVITY
VISUAL DISTURBANCES: MILD SENSITIVITY
AUDITORY DISTURBANCES: NOT PRESENT
AGITATION: NORMAL ACTIVITY
TOTAL SCORE: 2
ORIENTATION AND CLOUDING OF SENSORIUM: CANNOT DO SERIAL ADDITIONS OR IS UNCERTAIN ABOUT DATE
TOTAL SCORE: 3
NAUSEA AND VOMITING: NO NAUSEA AND NO VOMITING
HEADACHE, FULLNESS IN HEAD: NOT PRESENT
TREMOR: TREMOR NOT VISIBLE BUT CAN BE FELT, FINGERTIP TO FINGERTIP
ORIENTATION AND CLOUDING OF SENSORIUM: CANNOT DO SERIAL ADDITIONS OR IS UNCERTAIN ABOUT DATE
ORIENTATION AND CLOUDING OF SENSORIUM: CANNOT DO SERIAL ADDITIONS OR IS UNCERTAIN ABOUT DATE
TREMOR: NO TREMOR
AGITATION: *
TREMOR: NO TREMOR
HEADACHE, FULLNESS IN HEAD: NOT PRESENT
AUDITORY DISTURBANCES: NOT PRESENT
ANXIETY: NO ANXIETY (AT EASE)
ANXIETY: NO ANXIETY (AT EASE)
ORIENTATION AND CLOUDING OF SENSORIUM: ORIENTED AND CAN DO SERIAL ADDITIONS
VISUAL DISTURBANCES: NOT PRESENT
HEADACHE, FULLNESS IN HEAD: NOT PRESENT
NAUSEA AND VOMITING: NO NAUSEA AND NO VOMITING
AGITATION: SOMEWHAT MORE THAN NORMAL ACTIVITY
AUDITORY DISTURBANCES: NOT PRESENT
AUDITORY DISTURBANCES: NOT PRESENT
TREMOR: *
TOTAL SCORE: 1
HEADACHE, FULLNESS IN HEAD: NOT PRESENT
AGITATION: SOMEWHAT MORE THAN NORMAL ACTIVITY
ANXIETY: NO ANXIETY (AT EASE)
AUDITORY DISTURBANCES: NOT PRESENT

## 2022-08-29 ASSESSMENT — COGNITIVE AND FUNCTIONAL STATUS - GENERAL
MOVING FROM LYING ON BACK TO SITTING ON SIDE OF FLAT BED: UNABLE
EATING MEALS: A LITTLE
MOBILITY SCORE: 10
TURNING FROM BACK TO SIDE WHILE IN FLAT BAD: UNABLE
MOVING TO AND FROM BED TO CHAIR: UNABLE
DRESSING REGULAR LOWER BODY CLOTHING: A LOT
PERSONAL GROOMING: A LITTLE
SUGGESTED CMS G CODE MODIFIER MOBILITY: CL
TOILETING: A LOT
STANDING UP FROM CHAIR USING ARMS: A LITTLE
DAILY ACTIVITIY SCORE: 15
CLIMB 3 TO 5 STEPS WITH RAILING: TOTAL
HELP NEEDED FOR BATHING: A LOT
WALKING IN HOSPITAL ROOM: A LITTLE
SUGGESTED CMS G CODE MODIFIER DAILY ACTIVITY: CK
DRESSING REGULAR UPPER BODY CLOTHING: A LITTLE

## 2022-08-29 ASSESSMENT — ACTIVITIES OF DAILY LIVING (ADL): TOILETING: INDEPENDENT

## 2022-08-29 ASSESSMENT — GAIT ASSESSMENTS
DEVIATION: OTHER (COMMENT)
ASSISTIVE DEVICE: FRONT WHEEL WALKER
GAIT LEVEL OF ASSIST: MINIMAL ASSIST
DISTANCE (FEET): 20

## 2022-08-29 ASSESSMENT — FIBROSIS 4 INDEX: FIB4 SCORE: 2.62

## 2022-08-29 NOTE — ASSESSMENT & PLAN NOTE
"This is Sepsis Present on admission  SIRS criteria identified on my evaluation include: Fever, with temperature greater than 101 deg F and Tachycardia, with heart rate greater than 90 BPM  Source is urine vs lung. No sign of meningitis/rigid neck  Sepsis protocol initiated  Fluid resuscitation ordered per protocol  Crystalloid Fluid Administration: Fluid resuscitation ordered per standard protocol - 30 mL/kg per current or ideal body weight  IV antibiotics as appropriate for source of sepsis  Reassessment: I have reassessed the patient's hemodynamic status  Lactic 2.6, repeat pending  Check Blood and urine cultures  CXR is neg but rales on exam, possible aspiration. Screen for COVID/flu  Unasyn ordered\"    Procalc negative  When able to take PO deescalate to Augmentin  "

## 2022-08-29 NOTE — PROGRESS NOTES
ISOLATION PRECAUTIONS EDUCATION    Educated PATIENT, FAMILY, S.O: patient and family member on isolation for INFLUENZA and OTHER.    Educated on reason for isolation, how the infection may be transmitted, and how to help prevent transmission to others. Educated precautions involves staff and visitors wearing PPE, following Standard Precautions and performing meticulous hand hygiene in order to prevent transmission of infection.     Droplet Precautions: Educated that Droplet Precautions involves staff and visitors wearing PPE to include a surgical mask when in the patient room.     In addition, educated that they may leave their room, but prior to exiting the patient room each time, the patient needs to have on a fresh patient gown, a surgical mask must be worn by the patient while out of the patient room, and perform hand hygiene immediately prior to exiting the room.     Patient transport and mobilization on unit  Educated that they may leave their room, but prior to exiting, the patient needs to have on a fresh patient gown, ensure the potentially infectious area is covered, performing appropriate hand hygiene immediately prior to exiting the room.

## 2022-08-29 NOTE — ASSESSMENT & PLAN NOTE
I spent 4 minutes, discussing tobacco dependence and cardiac as well as pulmonary risk. Smoking cessation instructions. Code 52952   Daughter understands.

## 2022-08-29 NOTE — THERAPY
Physical Therapy   Initial Evaluation     Patient Name: Ady Blake  Age:  72 y.o., Sex:  male  Medical Record #: [id number]  Today's Date: 8/29/2022     Precautions  Precautions: Fall Risk  Comments: CIWA, hard of hearing    Assessment  Patient is 72 y.o. male presents after being found down, and admitted for mild rhabdo, encephalopathy, sepsis, Influenza A, RSV, and ETOH w/d. Pt demonstrates notable generalized weakness, as well as BUE/BLE tremors resulting in instability and unsteadiness. Pt requires min-modA for functional tasks, and only ambulated 20' this date w/FWW. Anticipate improvements in functional mobility during hospitalization, and pt's family endorsed being supportive, but needing to determine amount of support able to provide. With progress, anticipate pt may be able to progress to returning home with family support and HHPT; however, if progress remains limited and in the absence of family support, pt may require a short SNF stay prior to returning home alone.     Plan    Recommend Physical Therapy 3 times per week until therapy goals are met for the following treatments:  Bed Mobility, Gait Training, Neuro Re-Education / Balance, Self Care/Home Evaluation, Therapeutic Activities, and Therapeutic Exercises    DC Equipment Recommendations: Front-Wheel Walker  Discharge Recommendations: Recommend home health for continued physical therapy services (pending progress)       Subjective  Pt denied lightheadedness/dizziness this date. Pt agreeable to use the FWW with walking.      Objective       08/29/22 1121   Charge Group   PT Evaluation PT Evaluation Mod   Total Time Spent   PT Total Time Yes   PT Evaluation Time Spent (Mins) 17   PT Total Time Spent (Calculated) 17   Initial Contact Note    Initial Contact Note Order Received and Verified, Physical Therapy Evaluation in Progress with Full Report to Follow.   Precautions   Precautions Fall Risk   Comments CIWA, hard of hearing   Vitals   Pulse  68   Pulse Oximetry 98 %   O2 Delivery Device None - Room Air   Pain   Pain Scales 0 to 10 Scale    Pain 0 - 10 Group   Location Buttock;Back   Pain Rating Scale (NPRS) 3   Prior Living Situation   Prior Services Home-Independent   Housing / Facility 1 Story House   Steps Into Home 1   Steps In Home 0   Bathroom Set up Bathtub / Shower Combination   Equipment Owned Single Point Cane   Lives with - Patient's Self Care Capacity Alone and Able to Care For Self   Comments IND with driving, dressing, bathing, cooking, cleaning ADLs. Pt's daughter reports able to provide assistance upon D/C, and in the process of speaking with sibling regarding amout of assistance. Willing to consider having pt stay at their homes.   Prior Level of Functional Mobility   Bed Mobility Independent   Transfer Status Independent   Ambulation Independent  (Furniture cruises at home)   Distance Ambulation (Feet)   (community)   Assistive Devices Used None  (pt's daughter reports getting a cane for patient, but he refuses to use it)   History of Falls   History of Falls Yes  (Frequent falls reported due to impaired hearing and equilibrium)   Cognition    Comments alert. Disgruntled but cooperative with encouragement   Strength Lower Body   Lower Body Strength  WDL   Sensation Lower Body   Lower Extremity Sensation   WDL   Lower Body Muscle Tone   Lower Body Muscle Tone  WDL   Neurological Concerns   Neurological Concerns Yes   Comments Moderately tremulous BUE/BLE this date   Gait Analysis   Gait Level Of Assist Minimal Assist   Assistive Device Front Wheel Walker   Distance (Feet) 20   # of Times Distance was Traveled 1   Deviation Other (Comment)  (Moderate instability/difficulty with stepping sequence due to weakness/incoordination.)   Bed Mobility    Supine to Sit Moderate Assist  (assist trunk upright)   Scooting Contact Guard Assist   Functional Mobility   Sit to Stand Minimal Assist  (FWW)   How much difficulty does the patient currently  have...   Turning over in bed (including adjusting bedclothes, sheets and blankets)? 1   Sitting down on and standing up from a chair with arms (e.g., wheelchair, bedside commode, etc.) 1   Moving from lying on back to sitting on the side of the bed? 1   How much help from another person does the patient currently need...   Moving to and from a bed to a chair (including a wheelchair)? 3   Need to walk in a hospital room? 3   Climbing 3-5 steps with a railing? 1   6 clicks Mobility Score 10   Short Term Goals    Short Term Goal # 1 Pt will transfer supine<>sit SBA in 6 visits to improve bed mobility.   Short Term Goal # 2 Pt will transfer STS SBA w/LRAD in 6 visits to improve transfer ability.   Short Term Goal # 3 Pt will ambulate 100' SBA w/LRAD in 6 visits to improve access to environment   Education Group   Education Provided Role of Physical Therapist;Gait Training;Use of Assistive Device   Role of Physical Therapist Patient Response Verbal Demonstration   Gait Training Patient Response Reinforcement Needed   Use of Assistive Device Patient Response Reinforcement Needed   Problem List    Problems Impaired Bed Mobility;Impaired Transfers;Impaired Ambulation;Impaired Coordination;Impaired Balance;Decreased Activity Tolerance;Motor Planning / Sequencing   Anticipated Discharge Equipment and Recommendations   DC Equipment Recommendations Front-Wheel Walker   Discharge Recommendations Recommend home health for continued physical therapy services  (pending progress)   Interdisciplinary Plan of Care Collaboration   IDT Collaboration with  Nursing;Occupational Therapist   Patient Position at End of Therapy Seated;Chair Alarm On;Call Light within Reach;Tray Table within Reach;Family / Friend in Room   Collaboration Comments Collaborated with OT for eval given co-morbidities and impaired activity tolerance. Updated RN regarding performance   Session Information   Date / Session Number  8/29- 1(1/3, 9/4)

## 2022-08-29 NOTE — ASSESSMENT & PLAN NOTE
"Metabolic  CTH neg for acute pathology  Is intoxicated, CIWA protocol ordered  Check Vit B1 and B12, ammonia level  Workup for sepsis  Possible confabulation? IV thiamine ordered  Consider MRI brain if he does not improve\"    Speech to see if he can swallow, low threshold for Cortrak  Detox protocol  Treat flu/RSV  "

## 2022-08-29 NOTE — CARE PLAN
The patient is Stable - Low risk of patient condition declining or worsening    Shift Goals  Clinical Goals: WA  Patient Goals: rest  Family Goals: n/a    Progress made toward(s) clinical / shift goals:    Problem: Knowledge Deficit - Standard  Goal: Patient and family/care givers will demonstrate understanding of plan of care, disease process/condition, diagnostic tests and medications  Outcome: Progressing     Problem: Optimal Care for Alcohol Withdrawal  Goal: Optimal Care for the alcohol withdrawal patient  Outcome: Progressing     Problem: Seizure Precautions  Goal: Implementation of seizure precautions  Outcome: Progressing     Problem: Lifestyle Changes  Goal: Patient's ability to identify lifestyle changes and available resources to help reduce recurrence of condition will improve  Outcome: Progressing     Problem: Psychosocial  Goal: Patient's level of anxiety will decrease  Outcome: Progressing  Goal: Spiritual and cultural needs incorporated into hospitalization  Outcome: Progressing     Problem: Risk for Aspiration  Goal: Patient's risk for aspiration will be absent or decrease  Outcome: Progressing     Problem: Hemodynamics  Goal: Patient's hemodynamics, fluid balance and neurologic status will be stable or improve  Outcome: Progressing       Patient is not progressing towards the following goals:

## 2022-08-29 NOTE — THERAPY
"Speech Language Pathology   Clinical Swallow Evaluation     Patient Name: Ady Blake  AGE:  72 y.o., SEX:  male  Medical Record #: [id number]  Today's Date: 8/29/2022     Precautions  Precautions: (P) Fall Risk, Swallow Precautions ( See Comments), Other (See Comments) (reflux precautions)  Comments: (P) BALTAZAR MOTA    HPI:71 YO male admitted 8/28 2/2 being found down with confusion. PMHx: alcohol abuse, COPD. CMHx: encephalopathy, rhabdomyolysis, sepsis, COPD, alcohol abuse, HTN. Head CT 8/28 \"No acute intracranial abnormality. No evidence of acute intracranial hemorrhage or mass lesion\". CXR 8/28 \"No acute cardiac or pulmonary abnormalities are identified.\" No SLP hx at [facility].     Level of Consciousness: Awake  Affect/Behavior: Cooperative, Inappropriate  Follows Directives: Yes - simple commands only  Orientation: Self  Hearing: Functional hearing  Vision: Functional vision    Prior Living Situation & Level of Function:  Independent. Pt denies hx of dysphagia, endorses hx of reflux. Pt also stated \"every 4 months or so I get severely ill and can't keep anything down for 24 hours\".     Oral Mechanism Evaluation  Facial Symmetry: Equal  Facial Sensation: Equal  Labial Observations: WFL  Lingual Observations: Midline  Dentition: Edentulous, Upper denture, Lower denture  Comments:    Voice  Quality: WFL  Resonance: WFL  Intensity: Appropriate  Cough: WFL  Comments:    Current Method of Nutrition   NPO until cleared by speech pathology    Subjective  Pt asleep upon entrance but easily awakened. Pt demonstrating confusion and making inappropriate comments throughout evaluation.      Assessment  Positioning: Carias's (60-90 degrees)  Bolus Administration: Patient  Oxygen Requirements: Room Air  Factor(s) Affecting Performance: None    Swallowing Trials  Thin Liquid (TN0): WFL  Liquidised (LQ3): WFL  Pureed (PU4): WFL  Easy to Chew (EC7): WFL    Comments:  Adequate oral acceptance and containment. Timely swallow " "initiation and clear vocal quality appreciated. Pt demonstrated mildly prolonged but functional mastication of easy to chew. When offered silva crackers pt stated \"they give me heart burn\" and refused them. Sounds of regurgitation, hiccups, strained vocal quality and delayed coughing appreciated with progression of PO trials which is concerning for esophageal dysphagia.      Clinical Impressions  Clinical signs of esophageal dysphagia include belching followed by coughing, wet/gurgly vocal quality, and regurgitation. Dysphagia is likely chronic related to hx of ETOH and GERD. Instrumental swallow study  may be  indicated to objectively assess swallow function and further direct POC. Diet modification is not indicated at this time, may be indicated pending GI recs and/or results of esophagram.     Recommendations  1.  REGULAR/ THINS DIET  2.  Instrumentation: Instrumental swallow study pending clinical progress  3.  Swallowing Instructions & Precautions:   Supervision: Monitor   Positioning: Fully upright and midline during oral intake, Remain upright for 45 minutes after oral intake  Medication: Whole with liquid, Whole with puree  Strategies: Small bites/sips, Slow rate of intake  Oral Care: Q4h  4.  Given hx of GERD and s/sx concerning for esophageal dysphagia, recommend esophagram and/or GI consult.     SLP following.       Plan    Recommend Speech Therapy 3 times per week until therapy goals are met for the following treatments:  Dysphagia Training and Patient / Family / Caregiver Education.    Discharge Recommendations: (P) Recommend post-acute placement for additional speech therapy services prior to discharge home       Objective       08/29/22 0544   Charge Group   SLP Oral Pharyngeal Evaluation Oral Pharyngeal Evaluation   Total Treatment Time   SLP Time Spent Yes   SLP Oral Pharyngeal Evaluation (Mins) 18   Initial Contact Note    Initial Contact Note  Order Received and Verified, Speech Therapy " "Evaluation in Progress with Full Report to Follow.   Precautions   Precautions Fall Risk;Swallow Precautions ( See Comments);Other (See Comments)  (reflux precautions)   Comments CIWA Native   Vitals   O2 Delivery Device None - Room Air   Pain 0 - 10 Group   Therapist Pain Assessment Post Activity Pain Same as Prior to Activity;Nurse Notified;0   Prior Living Situation   Prior Services Home-Independent   Housing / Facility 1 Story House   Steps Into Home 1   Steps In Home 0   Bathroom Set up Bathtub / Shower Combination   Equipment Owned Single Point Cane   Lives with - Patient's Self Care Capacity Alone and Able to Care For Self   Comments per PT: IND with driving, dressing, bathing, cooking, cleaning ADLs. Pt's daughter reports able to provide assistance upon D/C, and in the process of speaking with sibling regarding amout of assistance. Willing to consider having pt stay at their homes.   Prior Level Of Function   Communication Within Functional Limits   Swallow Unknown   Dentition Edentulous   Dentures Uppers;Lowers   Vision Within Functional Limits for Evaluation   Patient's Primary Language English   Occupation (Pre-Hospital Vocational) Retired Due To Age   Dysphagia Rating   Nutritional Liquid Intake Rating Scale Non thickened beverages   Nutritional Food Intake Rating Scale Total oral diet with no restrictions   Patient / Family Goals   Patient / Family Goal #1 \"prime rib\"   Short Term Goals   Short Term Goal # 1 Pt will consume a diet of reg/thins with no s/sx of aspiration and min cues.   Education Group   Education Provided Dysphagia   Dysphagia Patient Response Acceptance;Explanation;Demonstration;Verbal Demonstration;Reinforcement Needed   Additional Comments pt will require reinforcement   Problem List   Problem List Dysphagia   Anticipated Discharge Needs   Discharge Recommendations Recommend post-acute placement for additional speech therapy services prior to discharge home   Therapy Recommendations " Upon DC Dysphagia Training;Patient / Family / Caregiver Education   Interdisciplinary Plan of Care Collaboration   IDT Collaboration with  Nursing   Patient Position at End of Therapy Seated;Bed Alarm On;Brittaney Vest Applied;Phone within Reach;Call Light within Reach;Tray Table within Reach   Collaboration Comments RN aware of results/recs

## 2022-08-29 NOTE — PROGRESS NOTES
Assumed pt care at 0700 . Pt is A&Ox 3, disoriented to time . Pt denies pain.  Pt's belongings are nearby, bed is in the lowest position and locked. Hourly rounding in place.

## 2022-08-29 NOTE — PROGRESS NOTES
[facility] Medicine Daily Progress Note    Date of Service  8/29/2022    Chief Complaint  Ady Blake is a 72 y.o. male admitted 8/28/2022 with .chief    Hospital Course  No notes on file  He currently smokes and drinks heavily. He has a history of asthma/RAD and hypertension. He presents with confusion and was sedated with benzodiazepines thus is a nonhistrian.   At the ED, afebrile, hemodynamically stable.  CXR appears clear  Head CT no acute pathology  Leukocytosis. AST elevated. Cr- normal range.  Flu, RSV Positive.  Placed on CIWA protocol for EtOHism/confusion. Started on antibiotics for possible aspiration.     Interval Problem Update  8/29. Sedated when awake confused. Daughter at bedside. She confirms he drinks a lot of beer daily. Poor mentation, Speech to see but may not be mentating well enough.    I have discussed this patient's plan of care and discharge plan at IDT rounds today with Case Management, Nursing, Nursing leadership, and other members of the IDT team.    Consultants/Specialty      Code Status  Full Code    Disposition  Patient is not medically cleared for discharge.   Anticipate discharge to  TBD .  I have placed the appropriate orders for post-discharge needs.    Review of Systems  Review of Systems   Unable to perform ROS: Mental acuity      Physical Exam  Temp:  [36.3 °C (97.4 °F)-36.8 °C (98.2 °F)] 36.3 °C (97.4 °F)  Pulse:  [54-99] 54  Resp:  [16-20] 19  BP: (127-159)/(66-96) 159/67  SpO2:  [94 %-100 %] 100 %    Physical Exam  Vitals and nursing note reviewed.   Constitutional:       Appearance: He is ill-appearing.      Comments: Elderly   HENT:      Head: Normocephalic and atraumatic.      Right Ear: External ear normal.      Left Ear: External ear normal.      Nose: Nose normal.      Mouth/Throat:      Mouth: Mucous membranes are moist.   Eyes:      General: No scleral icterus.     Conjunctiva/sclera: Conjunctivae normal.   Cardiovascular:      Rate and Rhythm: Normal rate  "and regular rhythm.      Heart sounds: No murmur heard.    No friction rub. No gallop.   Pulmonary:      Effort: Pulmonary effort is normal.      Breath sounds: Normal breath sounds.   Abdominal:      General: Abdomen is flat. Bowel sounds are normal. There is no distension.      Palpations: Abdomen is soft.      Tenderness: There is no abdominal tenderness. There is no guarding.   Musculoskeletal:         General: Normal range of motion.      Cervical back: Normal range of motion and neck supple.   Skin:     General: Skin is warm.   Neurological:      Mental Status: He is alert.      Motor: Weakness present.      Comments: Confused   Psychiatric:      Comments: Somnolent.       Fluids    Intake/Output Summary (Last 24 hours) at 8/29/2022 0811  Last data filed at 8/28/2022 2300  Gross per 24 hour   Intake 270 ml   Output --   Net 270 ml       Laboratory  Recent Labs     08/28/22  1425 08/29/22  0051   WBC 16.7* 12.6*   RBC 4.23* 3.82*   HEMOGLOBIN 14.2 12.9*   HEMATOCRIT 41.5* 37.7*   MCV 98.1* 98.7*   MCH 33.6* 33.8*   MCHC 34.2 34.2   RDW 50.5* 50.4*   PLATELETCT 257 201   MPV 9.7 9.6     Recent Labs     08/28/22  1425 08/29/22  0051   SODIUM 143 143   POTASSIUM 4.3 3.5*   CHLORIDE 106 108   CO2 15* 21   GLUCOSE 111* 89   BUN 28* 24*   CREATININE 1.18 0.83   CALCIUM 9.9 9.0                   Imaging  DX-CHEST-LIMITED (1 VIEW)   Final Result      1.  No acute cardiac or pulmonary abnormalities are identified.      CT-HEAD W/O   Final Result         1. No acute intracranial abnormality. No evidence of acute intracranial hemorrhage or mass lesion.                          Assessment/Plan  * Encephalopathy, alcohol abuse, flu/RSV +- (present on admission)  Assessment & Plan  Metabolic  CTH neg for acute pathology  Is intoxicated, [facility] protocol ordered  Check Vit B1 and B12, ammonia level  Workup for sepsis  Possible confabulation? IV thiamine ordered  Consider MRI brain if he does not improve\"    Speech to see if he " "can swallow, low threshold for Cortrak  Detox protocol  Treat flu/RSV    Tobacco dependence  Assessment & Plan  I spent 4 minutes, discussing tobacco dependence and cardiac as well as pulmonary risk. Smoking cessation instructions. Code 73763   Daughter understands.      Bronchitis  Assessment & Plan  Tamiflu  RSV is self limiting  Isolation    Rhabdomyolysis  Assessment & Plan  Renal function ok  Trend CPK, IVF ordered    Sepsis ([facility])  Assessment & Plan  This is Sepsis Present on admission  SIRS criteria identified on my evaluation include: Fever, with temperature greater than 101 deg F and Tachycardia, with heart rate greater than 90 BPM  Source is urine vs lung. No sign of meningitis/rigid neck  Sepsis protocol initiated  Fluid resuscitation ordered per protocol  Crystalloid Fluid Administration: Fluid resuscitation ordered per standard protocol - 30 mL/kg per current or ideal body weight  IV antibiotics as appropriate for source of sepsis  Reassessment: I have reassessed the patient's hemodynamic status  Lactic 2.6, repeat pending  Check Blood and urine cultures  CXR is neg but rales on exam, possible aspiration. Screen for COVID/flu  Unasyn ordered\"    Procalc negative  When able to take PO deescalate to Augmentin    COPD (chronic obstructive pulmonary disease) ([facility])- (present on admission)  Assessment & Plan  Cont spiriva and symbicort    Alcohol abuse- (present on admission)  Assessment & Plan  High risk for alcohol withdrawal  CIWA  Folate, MVI, thiamine\"    Alcoho lcessation encouraged    HTN (hypertension)- (present on admission)  Assessment & Plan  Hypertensive  Cont losartan when able  IV PRN ordered       VTE prophylaxis: enoxaparin ppx    I have performed a physical exam and reviewed and updated ROS and Plan today (8/29/2022). In review of yesterday's note (8/28/2022), there are no changes except as documented above.        "

## 2022-08-29 NOTE — PROGRESS NOTES
4 Eyes Skin Assessment Completed by Tomi Ambrocio RN and ALLAN Paz.    Head WDL  Ears WDL  Nose WDL  Mouth WDL  Neck WDL  Breast/Chest WDL  Shoulder Blades WDL  Spine WDL  (R) Arm/Elbow/Hand Redness and Blanching  (L) Arm/Elbow/Hand Redness and Blanching  Abdomen WDL  Groin WDL  Scrotum/Coccyx/Buttocks Redness, Non-Blanching, and Excoriation  (R) Leg WDL  (L) Leg Redness, Scab, Swelling, and Abrasion  (R) Heel/Foot/Toe WDL  (L) Heel/Foot/Toe WDL          Devices In Places Pulse Ox and Condom Cath      Interventions In Place Heel Mepilex, Sacral Mepilex, Pillows, Q2 Turns, Barrier Cream, and Pressure Redistribution Mattress    Possible Skin Injury Yes    Pictures Uploaded Into Epic Yes  Wound Consult Placed Yes  RN Wound Prevention Protocol Ordered Yes

## 2022-08-30 VITALS
BODY MASS INDEX: 22.49 KG/M2 | TEMPERATURE: 97.8 F | SYSTOLIC BLOOD PRESSURE: 136 MMHG | HEART RATE: 63 BPM | WEIGHT: 135 LBS | DIASTOLIC BLOOD PRESSURE: 62 MMHG | HEIGHT: 65 IN | RESPIRATION RATE: 18 BRPM | OXYGEN SATURATION: 99 %

## 2022-08-30 LAB
ALBUMIN SERPL BCP-MCNC: 3.2 G/DL (ref 3.2–4.9)
BUN SERPL-MCNC: 17 MG/DL (ref 8–22)
CALCIUM SERPL-MCNC: 8.6 MG/DL (ref 8.5–10.5)
CHLORIDE SERPL-SCNC: 106 MMOL/L (ref 96–112)
CO2 SERPL-SCNC: 20 MMOL/L (ref 20–33)
CREAT SERPL-MCNC: 0.85 MG/DL (ref 0.5–1.4)
ERYTHROCYTE [DISTWIDTH] IN BLOOD BY AUTOMATED COUNT: 50.3 FL (ref 35.9–50)
GFR SERPLBLD CREATININE-BSD FMLA CKD-EPI: 93 ML/MIN/1.73 M 2
GLUCOSE SERPL-MCNC: 83 MG/DL (ref 65–99)
HCT VFR BLD AUTO: 35 % (ref 42–52)
HGB BLD-MCNC: 11.6 G/DL (ref 14–18)
MCH RBC QN AUTO: 32.7 PG (ref 27–33)
MCHC RBC AUTO-ENTMCNC: 33.1 G/DL (ref 33.7–35.3)
MCV RBC AUTO: 98.6 FL (ref 81.4–97.8)
PHOSPHATE SERPL-MCNC: 3.1 MG/DL (ref 2.5–4.5)
PLATELET # BLD AUTO: 174 K/UL (ref 164–446)
PMV BLD AUTO: 9.4 FL (ref 9–12.9)
POTASSIUM SERPL-SCNC: 4 MMOL/L (ref 3.6–5.5)
RBC # BLD AUTO: 3.55 M/UL (ref 4.7–6.1)
SODIUM SERPL-SCNC: 140 MMOL/L (ref 135–145)
WBC # BLD AUTO: 10.3 K/UL (ref 4.8–10.8)

## 2022-08-30 PROCEDURE — 700105 HCHG RX REV CODE 258: Performed by: INTERNAL MEDICINE

## 2022-08-30 PROCEDURE — 99239 HOSP IP/OBS DSCHRG MGMT >30: CPT | Performed by: STUDENT IN AN ORGANIZED HEALTH CARE EDUCATION/TRAINING PROGRAM

## 2022-08-30 PROCEDURE — 97116 GAIT TRAINING THERAPY: CPT

## 2022-08-30 PROCEDURE — 36415 COLL VENOUS BLD VENIPUNCTURE: CPT

## 2022-08-30 PROCEDURE — 94640 AIRWAY INHALATION TREATMENT: CPT

## 2022-08-30 PROCEDURE — 85027 COMPLETE CBC AUTOMATED: CPT

## 2022-08-30 PROCEDURE — 94760 N-INVAS EAR/PLS OXIMETRY 1: CPT

## 2022-08-30 PROCEDURE — 700102 HCHG RX REV CODE 250 W/ 637 OVERRIDE(OP): Performed by: INTERNAL MEDICINE

## 2022-08-30 PROCEDURE — A9270 NON-COVERED ITEM OR SERVICE: HCPCS | Performed by: INTERNAL MEDICINE

## 2022-08-30 PROCEDURE — 94664 DEMO&/EVAL PT USE INHALER: CPT

## 2022-08-30 PROCEDURE — 97602 WOUND(S) CARE NON-SELECTIVE: CPT

## 2022-08-30 PROCEDURE — 700111 HCHG RX REV CODE 636 W/ 250 OVERRIDE (IP): Performed by: INTERNAL MEDICINE

## 2022-08-30 PROCEDURE — 80069 RENAL FUNCTION PANEL: CPT

## 2022-08-30 RX ORDER — OSELTAMIVIR PHOSPHATE 75 MG/1
75 CAPSULE ORAL 2 TIMES DAILY
Qty: 6 CAPSULE | Refills: 0 | Status: SHIPPED | OUTPATIENT
Start: 2022-08-30 | End: 2022-09-02

## 2022-08-30 RX ORDER — ACETAMINOPHEN 325 MG/1
650 TABLET ORAL EVERY 6 HOURS PRN
Qty: 30 TABLET | Refills: 0 | Status: SHIPPED | OUTPATIENT
Start: 2022-08-30

## 2022-08-30 RX ADMIN — FOLIC ACID 1 MG: 1 TABLET ORAL at 06:50

## 2022-08-30 RX ADMIN — THIAMINE HYDROCHLORIDE 250 MG: 100 INJECTION, SOLUTION INTRAMUSCULAR; INTRAVENOUS at 12:34

## 2022-08-30 RX ADMIN — OSELTAMIVIR PHOSPHATE 75 MG: 75 CAPSULE ORAL at 10:07

## 2022-08-30 RX ADMIN — BUDESONIDE AND FORMOTEROL FUMARATE DIHYDRATE 2 PUFF: 160; 4.5 AEROSOL RESPIRATORY (INHALATION) at 06:12

## 2022-08-30 RX ADMIN — SODIUM CHLORIDE 3 G: 900 INJECTION INTRAVENOUS at 06:52

## 2022-08-30 RX ADMIN — THERA TABS 1 TABLET: TAB at 06:51

## 2022-08-30 RX ADMIN — ACETAMINOPHEN 650 MG: 325 TABLET ORAL at 00:42

## 2022-08-30 RX ADMIN — SODIUM CHLORIDE 3 G: 900 INJECTION INTRAVENOUS at 00:35

## 2022-08-30 RX ADMIN — SODIUM CHLORIDE, POTASSIUM CHLORIDE, SODIUM LACTATE AND CALCIUM CHLORIDE: 600; 310; 30; 20 INJECTION, SOLUTION INTRAVENOUS at 07:46

## 2022-08-30 RX ADMIN — OSELTAMIVIR PHOSPHATE 75 MG: 75 CAPSULE ORAL at 00:35

## 2022-08-30 RX ADMIN — LOSARTAN POTASSIUM 100 MG: 50 TABLET, FILM COATED ORAL at 06:50

## 2022-08-30 ASSESSMENT — LIFESTYLE VARIABLES
TREMOR: TREMOR NOT VISIBLE BUT CAN BE FELT, FINGERTIP TO FINGERTIP
AGITATION: SOMEWHAT MORE THAN NORMAL ACTIVITY
TREMOR: TREMOR NOT VISIBLE BUT CAN BE FELT, FINGERTIP TO FINGERTIP
NAUSEA AND VOMITING: NO NAUSEA AND NO VOMITING
HEADACHE, FULLNESS IN HEAD: NOT PRESENT
VISUAL DISTURBANCES: NOT PRESENT
ANXIETY: NO ANXIETY (AT EASE)
PAROXYSMAL SWEATS: NO SWEAT VISIBLE
HEADACHE, FULLNESS IN HEAD: NOT PRESENT
ORIENTATION AND CLOUDING OF SENSORIUM: CANNOT DO SERIAL ADDITIONS OR IS UNCERTAIN ABOUT DATE
TOTAL SCORE: 3
TOTAL SCORE: 3
PAROXYSMAL SWEATS: NO SWEAT VISIBLE
AUDITORY DISTURBANCES: NOT PRESENT
ANXIETY: NO ANXIETY (AT EASE)
VISUAL DISTURBANCES: NOT PRESENT
AGITATION: SOMEWHAT MORE THAN NORMAL ACTIVITY
AUDITORY DISTURBANCES: NOT PRESENT
NAUSEA AND VOMITING: NO NAUSEA AND NO VOMITING
ORIENTATION AND CLOUDING OF SENSORIUM: CANNOT DO SERIAL ADDITIONS OR IS UNCERTAIN ABOUT DATE

## 2022-08-30 ASSESSMENT — COGNITIVE AND FUNCTIONAL STATUS - GENERAL
WALKING IN HOSPITAL ROOM: A LITTLE
STANDING UP FROM CHAIR USING ARMS: A LITTLE
SUGGESTED CMS G CODE MODIFIER MOBILITY: CK
CLIMB 3 TO 5 STEPS WITH RAILING: A LITTLE
MOBILITY SCORE: 18
MOVING TO AND FROM BED TO CHAIR: A LITTLE
TURNING FROM BACK TO SIDE WHILE IN FLAT BAD: A LITTLE
MOVING FROM LYING ON BACK TO SITTING ON SIDE OF FLAT BED: A LITTLE

## 2022-08-30 ASSESSMENT — PAIN DESCRIPTION - PAIN TYPE
TYPE: ACUTE PAIN
TYPE: ACUTE PAIN

## 2022-08-30 ASSESSMENT — GAIT ASSESSMENTS
ASSISTIVE DEVICE: FRONT WHEEL WALKER
DEVIATION: OTHER (COMMENT)
DISTANCE (FEET): 120
GAIT LEVEL OF ASSIST: STANDBY ASSIST

## 2022-08-30 NOTE — FACE TO FACE
Face to Face Supporting Documentation - Home Health    The encounter with this patient was in whole or in part the primary reason for home health admission.    Date of encounter:   Patient:                    MRN:                       YOB: 2021  Ady Blake  [id number]  1950     Home health to see patient for:  Skilled Nursing care for assessment, interventions & education, Occupational therapy evaluation and treatment, and Speech Language Pathology evaluation and treatment    Skilled need for:  Exacerbation of Chronic Disease State ETOH use    Skilled nursing interventions to include:  Comment: OT/SPL    Homebound status evidenced by:  Needs the assistance of another person in order to leave the home. Leaving home requires a considerable and taxing effort. There is a normal inability to leave the home.    Community Physician to provide follow up care: Mark Benito P.A.-C.     Optional Interventions? No      I certify the face to face encounter for this home health care referral meets the CMS requirements and the encounter/clinical assessment with the patient was, in whole, or in part, for the medical condition(s) listed above, which is the primary reason for home health care. Based on my clinical findings: the service(s) are medically necessary, support the need for home health care, and the homebound criteria are met.  I certify that this patient has had a face to face encounter by myself.  Albert Oliveira M.D. - NPI: [id number]

## 2022-08-30 NOTE — WOUND TEAM
"[facility]  Inpatient Services  Initial Wound and Skin Care Evaluation    Admission Date: 8/28/2022     Last order of IP CONSULT TO WOUND CARE was found on 8/29/2022 from Hospital Encounter on 8/28/2022     HPI, PMH, SH: Reviewed    Past Surgical History:   Procedure Laterality Date    INGUINAL HERNIA LAPAROSCOPIC  11/10/2014    Performed by John H Ganser, M.D. at [facility]    SHAYY BY LAPAROSCOPY      TONSILLECTOMY       Social History     Tobacco Use    Smoking status: Every Day     Packs/day: 1.00     Years: 40.00     Pack years: 40.00     Types: Cigarettes    Smokeless tobacco: Never   Substance Use Topics    Alcohol use: Yes     Comment: \"BEER ALOT PLUS SCOTCH 4 PER WK\"     Chief Complaint   Patient presents with    ALOC     Pt was BIBA from home after son found him down between his couch and the wall. The last time the pt was seen normal was Monday, daughter spoke with pt on Tuesday on the phone. Per son pt \"wasn't making sense.\" Pt A+ox4 on arrival but talking about \"gang bangers that broke in his sons house.\" Pt has hx of alcohol abuse and COPD.     Open Wound     Pt has sacral wound and states that its been an issue ' for awhile\"      Diagnosis: Encephalopathy [G93.40]    Unit where seen by Wound Team: S525/02     WOUND CONSULT/FOLLOW UP RELATED TO:  sacrum and knee     WOUND HISTORY:  \"Ady Blake is a 72 y.o. male who presented 8/28/2022 with alcohol abuse and COPD who was found down by family with confusion. He was telling them stories that did not make sense.  Labs show mild rhabdo. Alcohol level is positive. CTH neg for acute pathology  Patient is asleep at bedside, he as given ativan.  Daughter and son said they last spoke with him Tuesday and he was fine then. Patient did mention to them he feels like his equilibrium is off. He \"drinks beer like water\" as well as shots. He does not go any day without drinking.\"    Patient states he sees a physician outpatient " for sacrum.     WOUND ASSESSMENT/LDA  Wound 08/28/22 Pressure Injury Sacrum POA Unstageable (Active)   Wound Image   08/30/22 1300   Site Assessment Purple;Red;Yellow;Painful;Non-healing 08/30/22 1300   Periwound Assessment Fragile;[place] 08/30/22 1300   Margins Attached edges;Undefined edges 08/30/22 1300   Closure Secondary intention 08/30/22 1300   Drainage Amount Scant 08/30/22 1300   Drainage Description Serosanguineous 08/30/22 1300   Treatments Cleansed;Site care;Tape change;Offloading 08/30/22 1300   Wound Cleansing Normal Saline Irrigation 08/30/22 1300   Periwound Protectant Barrier Paste 08/30/22 1300   Dressing Cleansing/Solutions Not Applicable 08/30/22 1300   Dressing Options Viscopaste;Mepilex 08/30/22 1300   Dressing Changed Changed 08/30/22 1300   Dressing Status Clean;Dry;Intact 08/30/22 1300   Dressing Change/Treatment Frequency Daily, and As Needed 08/30/22 1300   NEXT Dressing Change/Treatment Date 08/31/22 08/30/22 1300   NEXT Weekly Photo (Inpatient Only) 09/06/22 08/30/22 1300   WOUND NURSE ONLY - Pressure Injury Stage U 08/30/22 1300   Wound Length (cm) 5 cm 08/30/22 1300   Wound Width (cm) 4.5 cm 08/30/22 1300   Wound Surface Area (cm^2) 22.5 cm^2 08/30/22 1300   Exposed Structures PREETI 08/30/22 1300   WOUND NURSE ONLY - Time Spent with Patient (mins) 45 08/30/22 1300   Number of days: 2        Vascular:    DAPHNE:   No results found.    Lab Values:    Lab Results   Component Value Date/Time    WBC 10.3 08/30/2022 01:41 AM    RBC 3.55 (L) 08/30/2022 01:41 AM    HEMOGLOBIN 11.6 (L) 08/30/2022 01:41 AM    HEMATOCRIT 35.0 (L) 08/30/2022 01:41 AM        Culture Results show:  No results found for this or any previous visit (from the past 720 hour(s)).    Pain Level/Medicated:  pain noted.        INTERVENTIONS BY WOUND TEAM:  Chart and images reviewed. Discussed with bedside RN. All areas of concern (based on picture review, LDA review and discussion with bedside RN) have been thoroughly assessed.  Documentation of areas based on significant findings. This RN in to assess patient. Performed standard wound care which includes appropriate positioning, dressing removal and non-selective debridement. Pictures and measurements obtained weekly if/when required.  Preparation for Dressing removal: Dressing soaked with removed mepilex  Non-selectively Debrided with:  NS and gauze.  Sharp debridement: n/a  Archana wound: Cleansed with NS and gauze , Prepped with barrier paste  Primary Dressing: Viscopaste patch  Secondary (Outer) Dressing: mepilex    Interdisciplinary consultation: Patient, Bedside RN (Jen)    EVALUATION / RATIONALE FOR TREATMENT:  Most Recent Date:  8/30/22: Patient admitted with POA unstageable pressure injury to sacrum, cannot see base of wound bed so unable to identify structures. Non-viable tissue present to center of wound. Viscopatch zinc impregnated gauze to encourage re-epithelialization of superficial 100% viable wound bed, and to provide a non-stick wound contact layer. Patient requested mepilex on top, applied. Patient knee abrasion kept open to air per patient request.        Goals: Steady decrease in wound area and depth weekly.    WOUND TEAM PLAN OF CARE ([X] for frequency of wound follow up,):   Nursing to follow dressing orders written for wound care. Contact wound team if area fails to progress, deteriorates or with any questions/concerns if something comes up before next scheduled follow up (See below as to whether wound is following and frequency of wound follow up)  Dressing changes by wound team:                   Follow up 3 times weekly:                NPWT change 3 times weekly:     Follow up 1-2 times weekly:    X  Follow up Bi-Monthly:                   Follow up as needed:     Other (explain):     NURSING PLAN OF CARE ORDERS (X):  Dressing changes: See Dressing Care orders: X  Skin care: See Skin Care orders:   RN Prevention Protocol:   Rectal tube care: See Rectal Tube Care  orders:   Other orders:    RSKIN:   CURRENTLY IN PLACE (X), APPLIED THIS VISIT (A), ORDERED (O):   Q shift Filipe:  X  Q shift pressure point assessments:  X    Surface/Positioning   Pressure redistribution mattress  X          Low Airloss          ICU Low Airloss   Bariatric FERMIN     Waffle cushion        Waffle Overlay          Reposition q 2 hours      TAPs Turning system     Z Clint Pillow     Offloading/Redistribution   Sacral Mepilex (Silicone dressing)   X  Heel Mepilex (Silicone dressing)         Heel float boots (Prevalon boot)             Float Heels off Bed with Pillows           Respiratory   Silicone O2 tubing   X      Gray Foam Ear protectors     Cannula fixation Device (Tender )          High flow offloading Clip    Elastic head band offloading device      Anchorfast                                                         Trach with Optifoam split foam             Containment/Moisture Prevention     Rectal tube or BMS    Purwick/Condom Cath        Calderon Catheter    Barrier wipes           Barrier paste   X    Antifungal tx      Interdry        Mobilization n/a      Up to chair        Ambulate      PT/OT      Nutrition       Dietician        Diabetes Education      PO     TF     TPN     NPO   # days     Other        Anticipated discharge plans:   LTACH:        SNF/Rehab:                  Home Health Care:     X      Outpatient Wound Center:          Self/Family Care:        Other:                  Vac Discharge Needs:   Not Applicable Pt not on a wound vac:  X     Regular Vac while inpatient, alternative dressing at DC:        Regular Vac in use and continued at DC:            Reg. Vac w/ Skin Sub/Biologic in use. Will need to be changed 2x wkly:      Veraflo Vac while inpatient, ok to transition to Regular Vac on Discharge:           Veraflo Vac while inpatient, will need to remain on Veraflo Vac upon discharge:

## 2022-08-30 NOTE — DIETARY
NUTRITION SERVICES - Alert received for newly identified wound. Wound team consult pending, will await wound staging to make recommendations if appropriate.     Supplements ordered per RD. Pt passed swallow evaluation yesterday for Regular diet with thin liquids. Recorded PO intake this morning was good at %. As pt with wound, he may benefit from addition of oral nutrition supplement. Will provide Boost Plus with all meals.    RD will monitor per dept policy.

## 2022-08-30 NOTE — PROGRESS NOTES
Received report from PM RN at 0700. Patient is A&Ox3-disoriented to situation. Stated pain level is 0/10. Bed in lowest position with bed rails up, call light in place, and patient belongings near patient. Patient expresses no needs at this time and hourly rounding in place. Bed alarm on. IVF infusing without complications. CIWA monitoring Q4H.

## 2022-08-30 NOTE — FACE TO FACE
Face to Face Note  -  Durable Medical Equipment    Albert Oliveira M.D. - NPI: [id number]  I certify that this patient is under my care and that they had a durable medical equipment(DME)face to face encounter by myself that meets the physician DME face-to-face encounter requirements with this patient on:    Date of encounter:   Patient:                    MRN:                       YOB: 2021  Ady Gonzales Hayden  [id number]  1950     The encounter with the patient was in whole, or in part, for the following medical condition, which is the primary reason for durable medical equipment:  Other -   General weakness    I certify that, based on my findings, the following durable medical equipment is medically necessary:    Walkers.    My Clinical findings support the need for the above equipment due to:  Abnormal Gait

## 2022-08-30 NOTE — RESPIRATORY CARE
"COPD EDUCATION by COPD CLINICAL EDUCATOR  8/30/2022 at 3:03 PM by Janice Woodward, RRT     Patient interviewed by COPD education team. Patient refused COPD program at this time. We did review his current medications for discharge.  Encouraged follow up with his PCP. An Action Plan was completed in the EMR to reflect current Respiratory Medication use.                    COPD Assessment  COPD Clinical Specialists ONLY  COPD Education Initiated:  (has discharge pending quick review of his medications and new plan- symbicort spriva and albutarol, use of spacer)  Physician Name: Roland HOGUE  Pulmonologist Name: none declined  Is this a COPD exacerbation patient?: No  $ Demo/Eval of SVN's, MDI's and Aerosols: Yes      PFT Results  No results found for: PFT Pulmonary Function Testing:  (no pft and declined completing bedside)    Meds to Beds  Would the patient like to opt in for Bedside Medication Delivery at Discharge?: Yes, interested     MY COPD ACTION PLAN     It is recommended that patients and physicians /healthcare providers complete this action plan together. This plan should be discussed at each physician visit and updated as needed.    The green, yellow and red zones show groups of symptoms of COPD. This list of symptoms is not comprehensive, and you may experience other symptoms. In the \"Actions\" column, your healthcare provider has recommended actions for you to take based on your symptoms.    Patient Name: Ady Blake   YOB: 1950   Last Updated on:     Green Zone:  I am doing well today Actions     Usual activitiy and exercise level   Take daily medications     Usual amounts of cough and phlegm/mucus   Use oxygen as prescribed     Sleep well at night   Continue regular exercise/diet plan     Appetite is good   At all times avoid cigarette smoke, inhaled irritants     Daily Medications (these medications are taken every day):   Tiotropium Bromide Monohydrate " "(Spiriva)  Budesonide-Formoterol Fumarate (Symbicort) 1 capsule  2 Puffs Once daily  Twice daily     Additional Information:  Use spacer and rinse mouth after taking your Symbicort    Yellow Zone:  I am having a bad day or a COPD flare Actions     More breathless than usual   Continue daily medications     I have less energy for my daily activities   Use quick relief inhaler as ordered     Increased or thicker phlegm/mucus   Use oxygen as prescribed     Using quick relief inhaler/nebulizer more often   Get plenty of rest     Swelling of ankles more than usual   Use pursed lip breathing     More coughing than usual   At all times avoid cigarette smoke, inhaled irritants     I feel like I have a \"chest cold\"     Poor sleep and my symptoms woke me up     My appetite is not good     My medicine is not helping      Call provider immediately if symptoms don’t improve     Continue daily medications, add rescue medications:   Albuterol 2 Puffs Every 6 hours PRN       Medications to be used during a flare up, (as Discussed with Provider):           Additional Information:  Use spacer    Red Zone:  I need urgent medical care Actions     Severe shortness of breath even at rest   Call 911 or seek medical care immediately     Not able to do any activity because of breathing      Fever or shaking chills      Feeling confused or very drowsy       Chest pains      Coughing up blood                  "

## 2022-08-30 NOTE — THERAPY
Physical Therapy   Daily Treatment     Patient Name: Ady Blake  Age:  72 y.o., Sex:  male  Medical Record #: [id number]  Today's Date: 8/30/2022     Precautions  Precautions: Fall Risk;Swallow Precautions ( See Comments)      Assessment  Pt demonstrated fair improvements in transfer and ambulatory abilities this date. Pt continues to demonstrate generalized weakness and slight postural instability; thus, recommend SBA with FWW for functional tasks at this time. Pt expressing preference to return home (also refusing need for additional PT services), and daughter reports feeling comfortable providing necessary level of assistance and helping pt get stronger at home. Established PT goals met, and plan to D/C from services. Recommend home with family support and FWW once medically appropriate.     Plan    Discharge secondary to goals met.     DC Equipment Recommendations: Front-Wheel Walker  Discharge Recommendations: Anticipate that the patient will have no further physical therapy needs after discharge from the hospital      Subjective  Pt reported ready to go home. Pt's daughter reported willing to assist pt at home at time of D/C.      Objective       08/30/22 1207   Charge Group   Charges  Yes   PT Gait Training 1   Total Time Spent   PT Total Time Yes   PT Gait Training Time Spent (Mins) 19   PT Total Time Spent (Calculated) 19   Precautions   Precautions Fall Risk;Swallow Precautions ( See Comments)   Pain   Pain Scales 0 to 10 Scale    Pain 0 - 10 Group   Location Buttock   Pain Rating Scale (NPRS) 2   Cognition    Level of Consciousness Alert   Comments disgruntled but cooperative   Balance   Sitting Balance (Static) Fair   Standing Balance (Static) Fair -   Gait Analysis   Gait Level Of Assist Standby Assist   Assistive Device Front Wheel Walker   Distance (Feet) 120   # of Times Distance was Traveled 1   Deviation Other (Comment)  (Decreased danisha, and mild postural sway but no overt LOB)    Skilled Intervention Verbal Cuing   Bed Mobility    Supine to Sit Standby Assist  (HOB slightly elevated)   Functional Mobility   Sit to Stand Standby Assist  (with and w/out FWW)   Toilet Transfers Standby Assist  (IND with hygiene)   Skilled Intervention Verbal Cuing   How much difficulty does the patient currently have...   Turning over in bed (including adjusting bedclothes, sheets and blankets)? 3   Sitting down on and standing up from a chair with arms (e.g., wheelchair, bedside commode, etc.) 3   Moving from lying on back to sitting on the side of the bed? 3   How much help from another person does the patient currently need...   Moving to and from a bed to a chair (including a wheelchair)? 3   Need to walk in a hospital room? 3   Climbing 3-5 steps with a railing? 3   6 clicks Mobility Score 18   Short Term Goals    Short Term Goal # 1 Pt will transfer supine<>sit SBA in 6 visits to improve bed mobility.   Goal Outcome # 1 Goal met   Short Term Goal # 2 Pt will transfer STS SBA w/LRAD in 6 visits to improve transfer ability.   Goal Outcome # 2 Goal met   Short Term Goal # 3 Pt will ambulate 100' SBA w/LRAD in 6 visits to improve access to environment   Goal Outcome # 3 Goal met   Education Group   Education Provided Gait Training;Role of Physical Therapist;Use of Assistive Device   Additional Comments Educated on current level of function vs prior level of function. Discussed safety considerations with furniture cruising and recommendation for use of FWW to improve safety. Discussed requiring SBA at this time for ADLs/ambulation, and pt declining any concerns about returning home. Spoke with daughter regarding support/assistance,and daughter reports plans to stay with pt and provide necessary amount of assistance. Discsused HHPT (pt not agreeable), and discussed exercise/walking regimen (dtr reported comfortable assisting)   Anticipated Discharge Equipment and Recommendations   DC Equipment Recommendations  Front-Wheel Walker   Discharge Recommendations Anticipate that the patient will have no further physical therapy needs after discharge from the hospital   Interdisciplinary Plan of Care Collaboration   IDT Collaboration with  Nursing   Collaboration Comments outcome of session   Session Information   Date / Session Number  8/30- 2(2/3, 9/4)

## 2022-08-30 NOTE — DISCHARGE SUMMARY
"Discharge Summary    CHIEF COMPLAINT ON ADMISSION  Chief Complaint   Patient presents with    ALOC     Pt was BIBA from home after son found him down between his couch and the wall. The last time the pt was seen normal was Monday, daughter spoke with pt on Tuesday on the phone. Per son pt \"wasn't making sense.\" Pt A+ox4 on arrival but talking about \"gang bangers that broke in his sons house.\" Pt has hx of alcohol abuse and COPD.     Open Wound     Pt has sacral wound and states that its been an issue ' for awhile\"        Reason for Admission  ems     Admission Date  8/28/2022    CODE STATUS  Full Code    HPI & HOSPITAL COURSE  72 y.o. male with past medical history of asthma/reactive airway disease, hypertension, alcohol abuse and tobacco abuse was admitted on 8/20/2022 for sepsis secondary to patient started on IV hydration as well as Tamiflu and on IV empiric antibiotics which were ultimately discontinued.  Patient requiring CIWA monitoring with Ativan which was ultimately titrated off.  His altered mental status resolved and was secondary to sepsis.  Patient was eval by physical therapy which recommended no needs.  Stable patient with in chronic condition was discharged home on Tamiflu and instructed to follow-up with his primary care provider within 2 weeks.  All results and plan of action were discussed with the patient for she voiced understanding agreement with the primary care team.  Patient was instructed to return to emergency department symptoms were to worsen.    Therefore, he is discharged in good and stable condition to home with close outpatient follow-up.    The patient met 2-midnight criteria for an inpatient stay at the time of discharge.    Discharge Date  8/30/2022    FOLLOW UP ITEMS POST DISCHARGE  Primary care provider follow post hospital discharge care    DISCHARGE DIAGNOSES  Principal Problem:    Encephalopathy, alcohol abuse, flu/RSV + POA: Yes  Active Problems:    HTN (hypertension) POA: " Yes    Alcohol abuse POA: Yes    COPD (chronic obstructive pulmonary disease) ([facility]) POA: Yes    Sepsis ([facility]) POA: Unknown    Rhabdomyolysis POA: Unknown    Bronchitis POA: Unknown    Tobacco dependence POA: Unknown  Resolved Problems:    * No resolved hospital problems. *      FOLLOW UP  No future appointments.  No follow-up provider specified.    MEDICATIONS ON DISCHARGE     Medication List        ASK your doctor about these medications        Instructions   albuterol 108 (90 Base) MCG/ACT Aers inhalation aerosol   Inhale 2 Puffs by mouth every 6 hours as needed for Shortness of Breath.  Dose: 2 Puff     losartan 100 MG Tabs  Commonly known as: COZAAR   Take 100 mg by mouth every day.  Dose: 100 mg     Symbicort 160-4.5 MCG/ACT Aero  Generic drug: budesonide-formoterol   Inhale 2 Puffs 2 times a day.  Dose: 2 Puff     tiotropium 18 MCG Caps  Commonly known as: SPIRIVA   Inhale 18 mcg by mouth every day.  Dose: 18 mcg              Allergies  No Known Allergies    DIET  Orders Placed This Encounter   Procedures    Diet Order Diet: Regular     Standing Status:   Standing     Number of Occurrences:   1     Order Specific Question:   Diet:     Answer:   Regular [1]       ACTIVITY  As tolerated.  Weight bearing as tolerated    CONSULTATIONS  None    PROCEDURES  None    LABORATORY  Lab Results   Component Value Date    SODIUM 140 08/30/2022    POTASSIUM 4.0 08/30/2022    CHLORIDE 106 08/30/2022    CO2 20 08/30/2022    GLUCOSE 83 08/30/2022    BUN 17 08/30/2022    CREATININE 0.85 08/30/2022    CREATININE 0.9 01/01/2007        Lab Results   Component Value Date    WBC 10.3 08/30/2022    HEMOGLOBIN 11.6 (L) 08/30/2022    HEMATOCRIT 35.0 (L) 08/30/2022    PLATELETCT 174 08/30/2022        Total time of the discharge process exceeds 33 minutes.

## 2022-08-31 NOTE — DISCHARGE INSTRUCTIONS
Discharge Instructions    Discharged to home by car with relative. Discharged via wheelchair, hospital escort: Yes.  Special equipment needed: Walker    Be sure to schedule a follow-up appointment with your primary care doctor or any specialists as instructed.     Discharge Plan:   Diet Plan: Discussed  Activity Level: Discussed  Confirmed Follow up Appointment: Patient to Call and Schedule Appointment  Confirmed Symptoms Management: Discussed  Medication Reconciliation Updated: Yes    I understand that a diet low in cholesterol, fat, and sodium is recommended for good health. Unless I have been given specific instructions below for another diet, I accept this instruction as my diet prescription.   Other diet: Regular    Special Instructions: None    -Is this patient being discharged with medication to prevent blood clots?  No    Is patient discharged on Warfarin / Coumadin?   No  Alcohol Abuse and Dependence Information, Adult  Alcohol is a widely available drug. People drink alcohol in different amounts. People who drink alcohol very often and in large amounts often have problems during and after drinking. They may develop what is called an alcohol use disorder. There are two main types of alcohol use disorders:  Alcohol abuse. This is when you use alcohol too much or too often. You may use alcohol to make yourself feel happy or to reduce stress. You may have a hard time setting a limit on the amount you drink.  Alcohol dependence. This is when you use alcohol consistently for a period of time, and your body changes as a result. This can make it hard to stop drinking because you may start to feel sick or feel different when you do not use alcohol. These symptoms are known as withdrawal.  How can alcohol abuse and dependence affect me?  Alcohol abuse and dependence can have a negative effect on your life. Drinking too much can lead to addiction. You may feel like you need alcohol to function normally. You may drink  alcohol before work in the morning, during the day, or as soon as you get home from work in the evening. These actions can result in:  Poor work performance.  Job loss.  Financial problems.  Car crashes or criminal charges from driving after drinking alcohol.  Problems in your relationships with friends and family.  Losing the trust and respect of coworkers, friends, and family.  Drinking heavily over a long period of time can permanently damage your body and brain, and can cause lifelong health issues, such as:  Damage to your liver or pancreas.  Heart problems, high blood pressure, or stroke.  Certain cancers.  Decreased ability to fight infections.  Brain or nerve damage.  Depression.  Early (premature) death.  If you are careless or you crave alcohol, it is easy to drink more than your body can handle (overdose). Alcohol overdose is a serious situation that requires hospitalization. It may lead to permanent injuries or death.  What can increase my risk?  Having a family history of alcohol abuse.  Having depression or other mental health conditions.  Beginning to drink at an early age.  Binge drinking often.  Experiencing trauma, stress, and an unstable home life during childhood.  Spending time with people who drink often.  What actions can I take to prevent or manage alcohol abuse and dependence?  Do not drink alcohol if:  Your health care provider tells you not to drink.  You are pregnant, may be pregnant, or are planning to become pregnant.  If you drink alcohol:  Limit how much you use to:  0-1 drink a day for women.  0-2 drinks a day for men.  Be aware of how much alcohol is in your drink. In the [place] one drink equals one 12 oz bottle of beer (355 mL), one 5 oz glass of wine (148 mL), or one 1½ oz glass of hard liquor (44 mL).  Stop drinking if you have been drinking too much. This can be very hard to do if you are used to abusing alcohol. If you begin to have withdrawal symptoms, talk with your health  care provider or a person that you trust. These symptoms may include anxiety, shaky hands, headache, nausea, sweating, or not being able to sleep.  Choose to drink nonalcoholic beverages in social gatherings and places where there may be alcohol.  Activity  Spend more time on activities that you enjoy that do not involve alcohol, like hobbies or exercise.  Find healthy ways to cope with stress, such as exercise, meditation, or spending time with people you care about.  General information  Talk to your family, coworkers, and friends about supporting you in your efforts to stop drinking. If they drink, ask them not to drink around you. Spend more time with people who do not drink alcohol.  If you think that you have an alcohol dependency problem:  Tell friends or family about your concerns.  Talk with your health care provider or another health professional about where to get help.  Work with a therapist and a chemical dependency counselor.  Consider joining a support group for people who struggle with alcohol abuse and dependence.  Where to find support    Your health care provider.  [employer]: www.[facility].org  Therapy and support groups  Local treatment centers or chemical dependency counselors.  Local AA groups in your community: www.aa.org  Where to find more information  Centers for Disease Control and Prevention: www.cdc.gov  National [place] on Alcohol Abuse and Alcoholism: www.niaaa.nih.gov  Alcoholics Anonymous (AA): www.aa.org  Contact a health care provider if:  You drank more or for longer than you intended on more than one occasion.  You tried to stop drinking or to cut back on how much you drink, but you were not able to.  You often drink to the point of vomiting or passing out.  You want to drink so badly that you cannot think about anything else.  You have problems in your life due to drinking, but you continue to drink.  You keep drinking even though you feel anxious, depressed, or have  experienced memory loss.  You have stopped doing the things you used to enjoy in order to drink.  You have to drink more than you used to in order to get the effect you want.  You experience anxiety, sweating, nausea, shakiness, and trouble sleeping when you try to stop drinking.  Get help right away if:  You have thoughts about hurting yourself or others.  You have serious withdrawal symptoms, including:  Confusion.  Racing heart.  High blood pressure.  Fever.  If you ever feel like you may hurt yourself or others, or have thoughts about taking your own life, get help right away. You can go to your nearest emergency department or call:  Your local emergency services (911 in the [place]  A suicide crisis helpline, such as the National Suicide Prevention Lifeline at 1-585.539.5725. This is open 24 hours a day.  Summary  Alcohol abuse and dependence can have a negative effect on your life. Drinking too much or too often can lead to addiction.  If you drink alcohol, limit how much you use.  If you are having trouble keeping your drinking under control, find ways to change your behavior. Hobbies, calming activities, exercise, or support groups can help.  If you feel you need help with changing your drinking habits, talk with your health care provider, a good friend, or a therapist, or go to an AA group.  This information is not intended to replace advice given to you by your health care provider. Make sure you discuss any questions you have with your health care provider.  Document Released: 12/12/2017 Document Revised: 04/07/2020 Document Reviewed: 02/25/2020  Elsevier Patient Education © 2020 Elsevier Inc.

## 2022-09-01 NOTE — DOCUMENTATION QUERY
[facility]                                                                       Query Response Note      PATIENT:               LINDSAY HILLS  ACCT #:                  [id number]  MRN:                     [id number]  :                      1950  ADMIT DATE:       2022 2:40 PM  DISCH DATE:        2022 5:46 PM  RESPONDING  PROVIDER #:        [id number]           QUERY TEXT:    A pressure injury to the sacrum is noted in the Medical Record by the Wound Care RN. Based on your judgement and review of the clinical evidence can a diagnosis be provided to support this finding?     NOTE:  If an appropriate response is not listed below, please respond with a new note.    The patient's Clinical Indicators include:  Per Wound Team Note   -Wound 22 Pressure Injury Sacrum POA Unstageable (Active)    Treatment:   Wound Team Consultation, viscopatch zinc impregnated gauze & Mepilex dressing, pressure reduction mattress, & offloading    Risk Factors:   Sepsis, flu & RSV positive, metabolic encephalopathy, rhabdomyolysis, & alcohol abuse     Thank You,  Winter An RN BSN  Clinical   Connect via [employer]  Options provided:   -- Pressure injury to the sacrum present on admission, is present   -- Non-pressure injury to the sacrum, present on admission, is present, (please specify the wound etiology)   -- Other explanation of clinical findings, (Please specify other explanation of clinical findings)   -- Unable to determine      Query created by: Winter An on 2022 5:25 PM    RESPONSE TEXT:    Pressure injury to the sacrum present on admission, is present          Electronically signed by:  KARIN YOUNG MD 2022 6:38 PM

## 2022-09-02 LAB
BACTERIA BLD CULT: NORMAL
BACTERIA BLD CULT: NORMAL
SIGNIFICANT IND 70042: NORMAL
SIGNIFICANT IND 70042: NORMAL
SITE SITE: NORMAL
SITE SITE: NORMAL
SOURCE SOURCE: NORMAL
SOURCE SOURCE: NORMAL

## 2022-09-14 NOTE — DISCHARGE PLANNING
"Case Management Discharge Planning    Admission Date: 8/28/2022  GMLOS: 4.8  ALOS: 2    6-Clicks ADL Score: 15  6-Clicks Mobility Score: 18  PT and/or OT Eval ordered: Yes  Post-acute Referrals Ordered: Yes  Post-acute Choice Obtained: No  Has referral(s) been sent to post-acute provider:  No      Anticipated Discharge Dispo:  d/c home w/ dtr transporting    DME Needed: Yes    DME Ordered: Yes    Action(s) Taken: Patient Conference    Escalations Completed: None    Medically Clear: Yes    Next Steps: Lsw spoke to MD. He indicates pt is medically cleared to d/c. MD and MACw met w/ pt at bedside. He will call his dtr to come pick him up tonight.     Lsw spoke to pt. He will call his dtr to p/u tonight. He lives alone. He does not want to stay at her house. He does not wan HH. He will accept a DME FWW. Pt said he wants to either \"get a 6 pack of beer or go to the bar for a few shots.\" Earlier, MD indicated we would not recommend that, but cannot stop him from whatever he decides to do post d/c.        Lsw f/u w/ HCM director. Pt being A/O x3: person, time, place is able to make his own decisions.    Macw updated bedside RN and charge RN.    Lsw completed choice for DME FWW and pt will accept. DPa or charge will contact traction for delivery.      Barriers to Discharge: DME    Is the patient up for discharge tomorrow: No, dtr to p/u pt tonight        " Multiple myeloma

## 2024-04-26 ENCOUNTER — HOSPITAL ENCOUNTER (INPATIENT)
Facility: MEDICAL CENTER | Age: 74
DRG: 189 | End: 2024-04-26
Attending: EMERGENCY MEDICINE | Admitting: INTERNAL MEDICINE
Payer: MEDICARE

## 2024-04-26 ENCOUNTER — APPOINTMENT (OUTPATIENT)
Dept: RADIOLOGY | Facility: MEDICAL CENTER | Age: 74
DRG: 189 | End: 2024-04-26
Attending: EMERGENCY MEDICINE
Payer: MEDICARE

## 2024-04-26 DIAGNOSIS — J96.01 ACUTE RESPIRATORY FAILURE WITH HYPOXIA (HCC): ICD-10-CM

## 2024-04-26 DIAGNOSIS — I20.0 UNSTABLE ANGINA (HCC): ICD-10-CM

## 2024-04-26 DIAGNOSIS — G93.41 METABOLIC ENCEPHALOPATHY: ICD-10-CM

## 2024-04-26 DIAGNOSIS — I21.4 NSTEMI (NON-ST ELEVATED MYOCARDIAL INFARCTION) (HCC): ICD-10-CM

## 2024-04-26 DIAGNOSIS — R79.89 LFT ELEVATION: ICD-10-CM

## 2024-04-26 DIAGNOSIS — F17.200 TOBACCO DEPENDENCE: ICD-10-CM

## 2024-04-26 DIAGNOSIS — S22.42XA CLOSED FRACTURE OF MULTIPLE RIBS OF LEFT SIDE, INITIAL ENCOUNTER: ICD-10-CM

## 2024-04-26 DIAGNOSIS — R53.81 DEBILITY: ICD-10-CM

## 2024-04-26 DIAGNOSIS — N17.9 AKI (ACUTE KIDNEY INJURY) (HCC): ICD-10-CM

## 2024-04-26 DIAGNOSIS — R09.02 HYPOXIA: ICD-10-CM

## 2024-04-26 DIAGNOSIS — F10.10 ALCOHOL ABUSE: ICD-10-CM

## 2024-04-26 DIAGNOSIS — G93.40 ENCEPHALOPATHY: ICD-10-CM

## 2024-04-26 DIAGNOSIS — E87.20 METABOLIC ACIDOSIS: ICD-10-CM

## 2024-04-26 DIAGNOSIS — Z71.89 ACP (ADVANCE CARE PLANNING): ICD-10-CM

## 2024-04-26 DIAGNOSIS — S22.079A CLOSED FRACTURE OF TENTH THORACIC VERTEBRA, UNSPECIFIED FRACTURE MORPHOLOGY, INITIAL ENCOUNTER (HCC): ICD-10-CM

## 2024-04-26 DIAGNOSIS — R74.8 ELEVATED LIVER ENZYMES: ICD-10-CM

## 2024-04-26 DIAGNOSIS — J40 BRONCHITIS: ICD-10-CM

## 2024-04-26 PROBLEM — E46 MALNUTRITION (HCC): Status: ACTIVE | Noted: 2024-04-26
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
## 2024-04-26 PROBLEM — D72.829 LEUKOCYTOSIS: Status: ACTIVE | Noted: 2024-04-26

## 2024-04-26 LAB
ALBUMIN SERPL BCP-MCNC: 3.9 G/DL (ref 3.2–4.9)
ALBUMIN/GLOB SERPL: 1.2 G/DL
ALP SERPL-CCNC: 111 U/L (ref 30–99)
ALT SERPL-CCNC: 3460 U/L (ref 2–50)
ANION GAP SERPL CALC-SCNC: 16 MMOL/L (ref 7–16)
ANION GAP SERPL CALC-SCNC: 27 MMOL/L (ref 7–16)
AST SERPL-CCNC: 1279 U/L (ref 12–45)
BASOPHILS # BLD AUTO: 0 % (ref 0–1.8)
BASOPHILS # BLD: 0 K/UL (ref 0–0.12)
BILIRUB SERPL-MCNC: 2.1 MG/DL (ref 0.1–1.5)
BUN SERPL-MCNC: 100 MG/DL (ref 8–22)
BUN SERPL-MCNC: 77 MG/DL (ref 8–22)
CALCIUM ALBUM COR SERPL-MCNC: 9.2 MG/DL (ref 8.5–10.5)
CALCIUM SERPL-MCNC: 8.2 MG/DL (ref 8.5–10.5)
CALCIUM SERPL-MCNC: 9.1 MG/DL (ref 8.5–10.5)
CHLORIDE SERPL-SCNC: 91 MMOL/L (ref 96–112)
CHLORIDE SERPL-SCNC: 95 MMOL/L (ref 96–112)
CK SERPL-CCNC: 57 U/L (ref 0–154)
CO2 SERPL-SCNC: 14 MMOL/L (ref 20–33)
CO2 SERPL-SCNC: 20 MMOL/L (ref 20–33)
COMMENT NL1176: NORMAL
CREAT SERPL-MCNC: 1.24 MG/DL (ref 0.5–1.4)
CREAT SERPL-MCNC: 1.68 MG/DL (ref 0.5–1.4)
EKG IMPRESSION: NORMAL
EOSINOPHIL # BLD AUTO: 0 K/UL (ref 0–0.51)
EOSINOPHIL NFR BLD: 0 % (ref 0–6.9)
ERYTHROCYTE [DISTWIDTH] IN BLOOD BY AUTOMATED COUNT: 45.5 FL (ref 35.9–50)
ETHANOL BLD-MCNC: <10.1 MG/DL
GFR SERPLBLD CREATININE-BSD FMLA CKD-EPI: 43 ML/MIN/1.73 M 2
GFR SERPLBLD CREATININE-BSD FMLA CKD-EPI: 61 ML/MIN/1.73 M 2
GLOBULIN SER CALC-MCNC: 3.2 G/DL (ref 1.9–3.5)
GLUCOSE SERPL-MCNC: 109 MG/DL (ref 65–99)
GLUCOSE SERPL-MCNC: 147 MG/DL (ref 65–99)
HAV IGM SERPL QL IA: NORMAL
HBV CORE IGM SER QL: NORMAL
HBV SURFACE AG SER QL: NORMAL
HCT VFR BLD AUTO: 42.2 % (ref 42–52)
HCV AB SER QL: NORMAL
HGB BLD-MCNC: 15.2 G/DL (ref 14–18)
LACTATE SERPL-SCNC: 1.6 MMOL/L (ref 0.5–2)
LACTATE SERPL-SCNC: 1.8 MMOL/L (ref 0.5–2)
LYMPHOCYTES # BLD AUTO: 13.8 K/UL (ref 1–4.8)
LYMPHOCYTES NFR BLD: 79.3 % (ref 22–41)
MANUAL DIFF BLD: NORMAL
MCH RBC QN AUTO: 33.4 PG (ref 27–33)
MCHC RBC AUTO-ENTMCNC: 36 G/DL (ref 32.3–36.5)
MCV RBC AUTO: 92.7 FL (ref 81.4–97.8)
MONOCYTES # BLD AUTO: 0.61 K/UL (ref 0–0.85)
MONOCYTES NFR BLD AUTO: 3.5 % (ref 0–13.4)
MORPHOLOGY BLD-IMP: NORMAL
NEUTROPHILS # BLD AUTO: 2.99 K/UL (ref 1.82–7.42)
NEUTROPHILS NFR BLD: 17.2 % (ref 44–72)
NRBC # BLD AUTO: 0.06 K/UL
NRBC BLD-RTO: 0.3 /100 WBC (ref 0–0.2)
PATH REV: NORMAL
PATH REV: NORMAL
PLATELET # BLD AUTO: 193 K/UL (ref 164–446)
PLATELET BLD QL SMEAR: NORMAL
PMV BLD AUTO: 11 FL (ref 9–12.9)
POTASSIUM SERPL-SCNC: 3.5 MMOL/L (ref 3.6–5.5)
POTASSIUM SERPL-SCNC: 4.1 MMOL/L (ref 3.6–5.5)
PROCALCITONIN SERPL-MCNC: 70.9 NG/ML
PROT SERPL-MCNC: 7.1 G/DL (ref 6–8.2)
RBC # BLD AUTO: 4.55 M/UL (ref 4.7–6.1)
RBC BLD AUTO: PRESENT
SMUDGE CELLS BLD QL SMEAR: NORMAL
SODIUM SERPL-SCNC: 131 MMOL/L (ref 135–145)
SODIUM SERPL-SCNC: 132 MMOL/L (ref 135–145)
WBC # BLD AUTO: 17.4 K/UL (ref 4.8–10.8)

## 2024-04-26 PROCEDURE — 80503 PATH CLIN CONSLTJ SF 5-20: CPT

## 2024-04-26 PROCEDURE — 99406 BEHAV CHNG SMOKING 3-10 MIN: CPT

## 2024-04-26 PROCEDURE — 85027 COMPLETE CBC AUTOMATED: CPT

## 2024-04-26 PROCEDURE — 82550 ASSAY OF CK (CPK): CPT

## 2024-04-26 PROCEDURE — 99497 ADVNCD CARE PLAN 30 MIN: CPT | Mod: 25 | Performed by: INTERNAL MEDICINE

## 2024-04-26 PROCEDURE — HZ2ZZZZ DETOXIFICATION SERVICES FOR SUBSTANCE ABUSE TREATMENT: ICD-10-PCS | Performed by: INTERNAL MEDICINE

## 2024-04-26 PROCEDURE — 36415 COLL VENOUS BLD VENIPUNCTURE: CPT

## 2024-04-26 PROCEDURE — 87493 C DIFF AMPLIFIED PROBE: CPT

## 2024-04-26 PROCEDURE — 700111 HCHG RX REV CODE 636 W/ 250 OVERRIDE (IP): Mod: JZ | Performed by: INTERNAL MEDICINE

## 2024-04-26 PROCEDURE — A9270 NON-COVERED ITEM OR SERVICE: HCPCS | Performed by: INTERNAL MEDICINE

## 2024-04-26 PROCEDURE — 302104 KIT,UROSTOMY 2-PIECE 2 1/4": Performed by: INTERNAL MEDICINE

## 2024-04-26 PROCEDURE — 80053 COMPREHEN METABOLIC PANEL: CPT

## 2024-04-26 PROCEDURE — 99285 EMERGENCY DEPT VISIT HI MDM: CPT

## 2024-04-26 PROCEDURE — 700105 HCHG RX REV CODE 258: Performed by: STUDENT IN AN ORGANIZED HEALTH CARE EDUCATION/TRAINING PROGRAM

## 2024-04-26 PROCEDURE — 700101 HCHG RX REV CODE 250: Mod: UD | Performed by: EMERGENCY MEDICINE

## 2024-04-26 PROCEDURE — 85007 BL SMEAR W/DIFF WBC COUNT: CPT

## 2024-04-26 PROCEDURE — 83605 ASSAY OF LACTIC ACID: CPT | Mod: 91

## 2024-04-26 PROCEDURE — 99223 1ST HOSP IP/OBS HIGH 75: CPT | Mod: 25 | Performed by: INTERNAL MEDICINE

## 2024-04-26 PROCEDURE — 72131 CT LUMBAR SPINE W/O DYE: CPT

## 2024-04-26 PROCEDURE — 96365 THER/PROPH/DIAG IV INF INIT: CPT

## 2024-04-26 PROCEDURE — 94640 AIRWAY INHALATION TREATMENT: CPT

## 2024-04-26 PROCEDURE — 86038 ANTINUCLEAR ANTIBODIES: CPT

## 2024-04-26 PROCEDURE — 80074 ACUTE HEPATITIS PANEL: CPT

## 2024-04-26 PROCEDURE — 93005 ELECTROCARDIOGRAM TRACING: CPT

## 2024-04-26 PROCEDURE — 770020 HCHG ROOM/CARE - TELE (206)

## 2024-04-26 PROCEDURE — 72128 CT CHEST SPINE W/O DYE: CPT

## 2024-04-26 PROCEDURE — 99406 BEHAV CHNG SMOKING 3-10 MIN: CPT | Performed by: INTERNAL MEDICINE

## 2024-04-26 PROCEDURE — 700101 HCHG RX REV CODE 250: Performed by: INTERNAL MEDICINE

## 2024-04-26 PROCEDURE — 700117 HCHG RX CONTRAST REV CODE 255: Mod: UD | Performed by: EMERGENCY MEDICINE

## 2024-04-26 PROCEDURE — 700111 HCHG RX REV CODE 636 W/ 250 OVERRIDE (IP): Mod: JZ | Performed by: STUDENT IN AN ORGANIZED HEALTH CARE EDUCATION/TRAINING PROGRAM

## 2024-04-26 PROCEDURE — 87324 CLOSTRIDIUM AG IA: CPT

## 2024-04-26 PROCEDURE — 700105 HCHG RX REV CODE 258: Performed by: INTERNAL MEDICINE

## 2024-04-26 PROCEDURE — 93005 ELECTROCARDIOGRAM TRACING: CPT | Performed by: EMERGENCY MEDICINE

## 2024-04-26 PROCEDURE — 80048 BASIC METABOLIC PNL TOTAL CA: CPT

## 2024-04-26 PROCEDURE — 82077 ASSAY SPEC XCP UR&BREATH IA: CPT

## 2024-04-26 PROCEDURE — 71045 X-RAY EXAM CHEST 1 VIEW: CPT

## 2024-04-26 PROCEDURE — 84145 PROCALCITONIN (PCT): CPT

## 2024-04-26 PROCEDURE — 700102 HCHG RX REV CODE 250 W/ 637 OVERRIDE(OP): Performed by: INTERNAL MEDICINE

## 2024-04-26 PROCEDURE — 87040 BLOOD CULTURE FOR BACTERIA: CPT

## 2024-04-26 PROCEDURE — 94669 MECHANICAL CHEST WALL OSCILL: CPT

## 2024-04-26 PROCEDURE — 71260 CT THORAX DX C+: CPT

## 2024-04-26 RX ORDER — IPRATROPIUM BROMIDE AND ALBUTEROL SULFATE 2.5; .5 MG/3ML; MG/3ML
3 SOLUTION RESPIRATORY (INHALATION) ONCE
Status: COMPLETED | OUTPATIENT
Start: 2024-04-26 | End: 2024-04-26

## 2024-04-26 RX ORDER — LORAZEPAM 2 MG/1
4 TABLET ORAL
Status: DISCONTINUED | OUTPATIENT
Start: 2024-04-26 | End: 2024-04-27

## 2024-04-26 RX ORDER — IPRATROPIUM BROMIDE AND ALBUTEROL SULFATE 2.5; .5 MG/3ML; MG/3ML
3 SOLUTION RESPIRATORY (INHALATION)
Status: DISCONTINUED | OUTPATIENT
Start: 2024-04-26 | End: 2024-04-27

## 2024-04-26 RX ORDER — AMLODIPINE BESYLATE 5 MG/1
5 TABLET ORAL DAILY
Status: DISCONTINUED | OUTPATIENT
Start: 2024-04-27 | End: 2024-05-08 | Stop reason: HOSPADM

## 2024-04-26 RX ORDER — ACETAMINOPHEN 500 MG
500-1000 TABLET ORAL EVERY 6 HOURS PRN
Status: ON HOLD | COMMUNITY
End: 2024-05-07

## 2024-04-26 RX ORDER — LABETALOL HYDROCHLORIDE 5 MG/ML
10 INJECTION, SOLUTION INTRAVENOUS EVERY 4 HOURS PRN
Status: DISCONTINUED | OUTPATIENT
Start: 2024-04-26 | End: 2024-05-08 | Stop reason: HOSPADM

## 2024-04-26 RX ORDER — LORAZEPAM 0.5 MG/1
0.5 TABLET ORAL EVERY 4 HOURS PRN
Status: DISCONTINUED | OUTPATIENT
Start: 2024-04-26 | End: 2024-04-27

## 2024-04-26 RX ORDER — ALBUTEROL SULFATE 90 UG/1
1 AEROSOL, METERED RESPIRATORY (INHALATION) EVERY 4 HOURS PRN
Status: DISCONTINUED | OUTPATIENT
Start: 2024-04-26 | End: 2024-05-08 | Stop reason: HOSPADM

## 2024-04-26 RX ORDER — GAUZE BANDAGE 2" X 2"
100 BANDAGE TOPICAL DAILY
Status: DISCONTINUED | OUTPATIENT
Start: 2024-04-27 | End: 2024-04-27

## 2024-04-26 RX ORDER — LORAZEPAM 2 MG/ML
1 INJECTION INTRAMUSCULAR
Status: DISCONTINUED | OUTPATIENT
Start: 2024-04-26 | End: 2024-04-27

## 2024-04-26 RX ORDER — SODIUM CHLORIDE 9 MG/ML
INJECTION, SOLUTION INTRAVENOUS CONTINUOUS
Status: DISCONTINUED | OUTPATIENT
Start: 2024-04-26 | End: 2024-05-01 | Stop reason: ALTCHOICE

## 2024-04-26 RX ORDER — LORAZEPAM 2 MG/ML
2 INJECTION INTRAMUSCULAR
Status: DISCONTINUED | OUTPATIENT
Start: 2024-04-26 | End: 2024-04-27

## 2024-04-26 RX ORDER — LORAZEPAM 1 MG/1
1 TABLET ORAL EVERY 4 HOURS PRN
Status: DISCONTINUED | OUTPATIENT
Start: 2024-04-26 | End: 2024-04-27

## 2024-04-26 RX ORDER — FOLIC ACID 1 MG/1
1 TABLET ORAL DAILY
Status: DISPENSED | OUTPATIENT
Start: 2024-04-27 | End: 2024-05-01

## 2024-04-26 RX ORDER — LORAZEPAM 2 MG/ML
1.5 INJECTION INTRAMUSCULAR
Status: DISCONTINUED | OUTPATIENT
Start: 2024-04-26 | End: 2024-04-27

## 2024-04-26 RX ORDER — ENOXAPARIN SODIUM 100 MG/ML
40 INJECTION SUBCUTANEOUS DAILY
Status: DISCONTINUED | OUTPATIENT
Start: 2024-04-26 | End: 2024-05-08 | Stop reason: HOSPADM

## 2024-04-26 RX ORDER — AMLODIPINE BESYLATE 5 MG/1
5 TABLET ORAL DAILY
Status: ON HOLD | COMMUNITY
End: 2024-05-07

## 2024-04-26 RX ORDER — LORAZEPAM 2 MG/ML
0.5 INJECTION INTRAMUSCULAR EVERY 4 HOURS PRN
Status: DISCONTINUED | OUTPATIENT
Start: 2024-04-26 | End: 2024-04-27

## 2024-04-26 RX ORDER — LORAZEPAM 2 MG/1
2 TABLET ORAL
Status: DISCONTINUED | OUTPATIENT
Start: 2024-04-26 | End: 2024-04-27

## 2024-04-26 RX ADMIN — PIPERACILLIN SODIUM AND TAZOBACTAM SODIUM 3.38 G: 3; .375 INJECTION, POWDER, LYOPHILIZED, FOR SOLUTION INTRAVENOUS at 21:10

## 2024-04-26 RX ADMIN — SODIUM CHLORIDE: 9 INJECTION, SOLUTION INTRAVENOUS at 17:43

## 2024-04-26 RX ADMIN — THIAMINE HYDROCHLORIDE: 100 INJECTION, SOLUTION INTRAMUSCULAR; INTRAVENOUS at 16:15

## 2024-04-26 RX ADMIN — LORAZEPAM 1 MG: 1 TABLET ORAL at 18:03

## 2024-04-26 RX ADMIN — IPRATROPIUM BROMIDE AND ALBUTEROL SULFATE 3 ML: 2.5; .5 SOLUTION RESPIRATORY (INHALATION) at 19:28

## 2024-04-26 RX ADMIN — LORAZEPAM 1 MG: 1 TABLET ORAL at 21:08

## 2024-04-26 RX ADMIN — IOHEXOL 95 ML: 350 INJECTION, SOLUTION INTRAVENOUS at 14:15

## 2024-04-26 RX ADMIN — IPRATROPIUM BROMIDE AND ALBUTEROL SULFATE 3 ML: 2.5; .5 SOLUTION RESPIRATORY (INHALATION) at 14:51

## 2024-04-26 RX ADMIN — ENOXAPARIN SODIUM 40 MG: 100 INJECTION SUBCUTANEOUS at 17:44

## 2024-04-26 RX ADMIN — PIPERACILLIN AND TAZOBACTAM 3.38 G: 3; .375 INJECTION, POWDER, FOR SOLUTION INTRAVENOUS at 17:44

## 2024-04-26 ASSESSMENT — PATIENT HEALTH QUESTIONNAIRE - PHQ9
SUM OF ALL RESPONSES TO PHQ9 QUESTIONS 1 AND 2: 0
1. LITTLE INTEREST OR PLEASURE IN DOING THINGS: NOT AT ALL
2. FEELING DOWN, DEPRESSED, IRRITABLE, OR HOPELESS: NOT AT ALL

## 2024-04-26 ASSESSMENT — LIFESTYLE VARIABLES
VISUAL DISTURBANCES: NOT PRESENT
TOTAL SCORE: 8
NAUSEA AND VOMITING: NO NAUSEA AND NO VOMITING
AGITATION: NORMAL ACTIVITY
ORIENTATION AND CLOUDING OF SENSORIUM: CANNOT DO SERIAL ADDITIONS OR IS UNCERTAIN ABOUT DATE
ANXIETY: *
ORIENTATION AND CLOUDING OF SENSORIUM: ORIENTED AND CAN DO SERIAL ADDITIONS
TREMOR: TREMOR NOT VISIBLE BUT CAN BE FELT, FINGERTIP TO FINGERTIP
AGITATION: *
NAUSEA AND VOMITING: NO NAUSEA AND NO VOMITING
TREMOR: *
ANXIETY: MILDLY ANXIOUS
AUDITORY DISTURBANCES: NOT PRESENT
VISUAL DISTURBANCES: NOT PRESENT
TOTAL SCORE: 8
EVER_SMOKED: YES
HEADACHE, FULLNESS IN HEAD: MILD
PAROXYSMAL SWEATS: *
HEADACHE, FULLNESS IN HEAD: NOT PRESENT
PAROXYSMAL SWEATS: BARELY PERCEPTIBLE SWEATING. PALMS MOIST
AUDITORY DISTURBANCES: NOT PRESENT

## 2024-04-26 ASSESSMENT — ENCOUNTER SYMPTOMS
FEVER: 0
NAUSEA: 0
CHILLS: 0
BLURRED VISION: 0
CLAUDICATION: 0
NECK PAIN: 0
PHOTOPHOBIA: 0
FALLS: 1
ABDOMINAL PAIN: 0
WEIGHT LOSS: 0
VOMITING: 0
DIZZINESS: 0
COUGH: 0
PALPITATIONS: 0
SPEECH CHANGE: 0
HEMOPTYSIS: 0
WEAKNESS: 0
DIARRHEA: 0
CONSTIPATION: 0
DOUBLE VISION: 0
MYALGIAS: 0
ORTHOPNEA: 0

## 2024-04-26 ASSESSMENT — FIBROSIS 4 INDEX: FIB4 SCORE: 3.11

## 2024-04-26 ASSESSMENT — COPD QUESTIONNAIRES
DO YOU EVER COUGH UP ANY MUCUS OR PHLEGM?: YES, A FEW DAYS A WEEK OR MONTH
DURING THE PAST 4 WEEKS HOW MUCH DID YOU FEEL SHORT OF BREATH: SOME OF THE TIME
COPD SCREENING SCORE: 6
HAVE YOU SMOKED AT LEAST 100 CIGARETTES IN YOUR ENTIRE LIFE: YES

## 2024-04-26 NOTE — ASSESSMENT & PLAN NOTE
Cachectic appearance  Nutrition consult  Encourage the patient to quit drinking alcohol and smoking

## 2024-04-26 NOTE — ED TRIAGE NOTES
Vitals:    04/26/24 1117   BP: 116/56   Pulse: 92   Resp: 20   Temp: 36.4 °C (97.6 °F)   SpO2: 97%     Chief Complaint   Patient presents with    T-5000 GLF    Shortness of Breath    Rib Pain     Pt reports he had a GLF on Sunday, landing on his left side. Since he has had left sided rib pain and increasing SOB. He did hit his head as well and has a left sided black eye. No thinners. There is some crepitus to the left chest wall.     Pt arrives via [facility]. He is alert and oriented x 4, quite hard of hearing. Hx COPD, not on oxygen.

## 2024-04-26 NOTE — ASSESSMENT & PLAN NOTE
With creatinine 1.6  Likely related to dehydration  IV fluid and labs daily bladder scan to rule out retention  CPK is normal  Resolved

## 2024-04-26 NOTE — ASSESSMENT & PLAN NOTE
Heavy drinking for years  CIWA protocol  High-dose of thiamine and multivitamins  Encouraged the patient to quit

## 2024-04-26 NOTE — ASSESSMENT & PLAN NOTE
4/26, patient is alert and oriented x 4, discussed the plan of care with the patient, answered all his questions, discussed the CODE STATUS with the patient, patient wants to be full code.  Time 15 minutes

## 2024-04-26 NOTE — H&P
Hospital Medicine History & Physical Note    Date of Service  4/26/2024    Primary Care Physician  Mark Benito P.A.-C.    Consultants  None  Code Status  Full Code    Chief Complaint  Chief Complaint   Patient presents with    T-5000 GLF    Shortness of Breath    Rib Pain       History of Presenting Illness    74-year-old male with history of smoking, alcoholism and hypertension who presented 4/26 with fall.  Patient states he is drinking beer every day and smoking half a pack.  Patient tripped when he was walking and using cane and fell on his left side, denied any dizziness or lightheadedness, denied any loss of consciousness.  Patient lives by himself and usually does all daily activities by himself.  Patient was noticed to have cachectic appearance and bruises on his body.  Vital signs were stable and patient was on room air.  Chest x-ray showed signs of COPD however no signs of pneumonia.  CT lumbar, thoracic showed acute compression fracture of T10, CT abdomen showed rib fracture on the left side, seventh, eighth and ninth with diffuse colitis and severe fatty liver disease.  Labs showed leukocytosis 17 with elevated liver enzymes ALT 3460 and AST 1279 with total bilirubin 2.1 also patient was found to have bicarb 14 with elevated anion gap and potassium 3.5, IV fluid with CIWA protocol was initiated.      I discussed the plan of care with patient, bedside RN, and ED physician .    Review of Systems  Review of Systems   Constitutional:  Positive for malaise/fatigue. Negative for chills, fever and weight loss.   HENT:  Negative for ear pain, hearing loss and tinnitus.    Eyes:  Negative for blurred vision, double vision and photophobia.   Respiratory:  Negative for cough and hemoptysis.    Cardiovascular:  Negative for chest pain, palpitations, orthopnea and claudication.   Gastrointestinal:  Negative for abdominal pain, constipation, diarrhea, nausea and vomiting.   Genitourinary:  Negative for dysuria,  frequency and urgency.   Musculoskeletal:  Positive for falls and joint pain. Negative for myalgias and neck pain.   Skin:  Negative for rash.   Neurological:  Negative for dizziness, speech change and weakness.       Past Medical History   has a past medical history of ACP (advance care planning) (4/26/2024), Arthritis, Back pain, Bronchitis (8/29/2022), Chronic airway obstruction, not elsewhere classified, Dental disorder, Fall (4/26/2024), Glaucoma, Hiatus hernia syndrome, HTN (hypertension) (6/14/2015), Myocardial infarct (HCC), and Unstable angina (HCC) (6/14/2015).    Surgical History   has a past surgical history that includes tonsillectomy; emily by laparoscopy; and inguinal hernia laparoscopic (11/10/2014).     Family History  Family history reviewed with patient. There is no family history that is pertinent to the chief complaint.     Social History   reports that he has been smoking cigarettes. He has a 40 pack-year smoking history. He has never used smokeless tobacco. He reports current alcohol use. He reports current drug use.    Allergies  No Known Allergies    Medications  Prior to Admission Medications   Prescriptions Last Dose Informant Patient Reported? Taking?   SYMBICORT 160-4.5 MCG/ACT Aerosol 4/25/2024 at [facility] Patient's Home Pharmacy Yes Yes   Sig: Inhale 1 Puff 2 times a day.   acetaminophen (TYLENOL) 500 MG Tab 4/25/2024 at [facility] Patient's Home Pharmacy Yes Yes   Sig: Take 500-1,000 mg by mouth every 6 hours as needed. Indications: Pain   albuterol (VENTOLIN OR PROVENTIL) 108 (90 BASE) MCG/ACT AERS inhalation aerosol PRN at [facility] Patient's Home Pharmacy Yes No   Sig: Inhale 1 Puff every four hours as needed for Shortness of Breath.   amLODIPine (NORVASC) 5 MG Tab 4/24/2024 at [facility] Patient's Home Pharmacy Yes Yes   Sig: Take 5 mg by mouth every day.   losartan (COZAAR) 100 MG TABS 4/24/2024 at [facility] Patient's Home Pharmacy Yes No   Sig: Take 100 mg by mouth every day.      Facility-Administered  "Medications: None       Physical Exam  Temp:  [36.4 °C (97.6 °F)] 36.4 °C (97.6 °F)  Pulse:  [83-92] 85  Resp:  [20-22] 20  BP: (108-124)/(53-65) 124/64  SpO2:  [92 %-97 %] 94 %  Blood Pressure : 124/64   Temperature: 36.4 °C (97.6 °F)   Pulse: 85   Respiration: 20   Pulse Oximetry: 94 %       Physical Exam  Constitutional:       General: He is not in acute distress.     Appearance: He is ill-appearing.      Comments: Cachectic appearance   Eyes:      General: No scleral icterus.  Cardiovascular:      Rate and Rhythm: Normal rate.      Heart sounds: No murmur heard.  Pulmonary:      Effort: No respiratory distress.      Breath sounds: Wheezing and rales present.   Abdominal:      General: There is no distension.      Tenderness: There is no abdominal tenderness. There is no right CVA tenderness, left CVA tenderness or guarding.   Musculoskeletal:      Right lower leg: No edema.      Left lower leg: No edema.   Lymphadenopathy:      Cervical: No cervical adenopathy.   Skin:     Coloration: Skin is not jaundiced.      Findings: Bruising and lesion present. No rash.   Neurological:      General: No focal deficit present.      Mental Status: He is alert and oriented to person, place, and time. Mental status is at baseline.      Cranial Nerves: No cranial nerve deficit.      Motor: No weakness.      Gait: Gait normal.         Laboratory:  Recent Labs     04/26/24  1142   WBC 17.4*   RBC 4.55*   HEMOGLOBIN 15.2   HEMATOCRIT 42.2   MCV 92.7   MCH 33.4*   MCHC 36.0   RDW 45.5   PLATELETCT 193   MPV 11.0     Recent Labs     04/26/24  1142   SODIUM 132*   POTASSIUM 3.5*   CHLORIDE 91*   CO2 14*   GLUCOSE 109*   *   CREATININE 1.68*   CALCIUM 9.1     Recent Labs     04/26/24  1142   ALTSGPT 3460*   ASTSGOT 1279*   ALKPHOSPHAT 111*   TBILIRUBIN 2.1*   GLUCOSE 109*         No results for input(s): \"NTPROBNP\" in the last 72 hours.      No results for input(s): \"TROPONINT\" in the last 72 hours.    Imaging:  CT-LSPINE W/O " PLUS RECONS   Final Result      Mild multilevel degenerative disease without definite fracture.      CT-TSPINE W/O PLUS RECONS   Final Result      1.  Acute superior endplate compression fracture of the T10 vertebral body with moderate height loss.      CT-CHEST,ABDOMEN,PELVIS WITH   Final Result      1.  Acute left lateral seventh, eighth, ninth rib fractures.   2.  Moderate superior endplate compression fracture of the T10 vertebral body which appears acute with fracture lines visible.   3.  Ileum enteritis.   4.  Diffuse colitis.   5.  Severe fatty liver infiltration.      DX-CHEST-PORTABLE (1 VIEW)   Final Result      No acute cardiac or pulmonary abnormalities are identified.      US-RUQ    (Results Pending)   EC-ECHOCARDIOGRAM COMPLETE W/ CONT    (Results Pending)   IB-XTOAMKV-B/O    (Results Pending)       X-Ray:  I have personally reviewed the images and compared with prior images.  EKG:  I have personally reviewed the images and compared with prior images.    Assessment/Plan:  Justification for Admission Status  I anticipate this patient will require at least two midnights for appropriate medical management, necessitating inpatient admission because acute hepatitis    Patient will need a Telemetry bed on CARDIOLOGY service .  The need is secondary to acute hepatitis.    * Acute respiratory failure with hypoxia (HCC)- (present on admission)  Assessment & Plan  Patient is on room air  Likely related to COPD  Inhalers    Elevated liver enzymes  Assessment & Plan  Severely elevated enzymes around thousands   Likely related to acute alcoholic hepatitis and possible for  Order MRCP  Labs daily  Close monitoring and consider steroid if needed      Fall  Assessment & Plan  Patient has cachectic appearance, multiple bruises on his body  Patient using cane at home, lives by himself  PT and OT  Check vitamin D and TSH    Leukocytosis  Assessment & Plan  Likely related to stress  No signs of infection or fever, holding  on antibiotics at this time    Metabolic acidosis  Assessment & Plan  Likely related to alcoholic, starvation and fall with kidney failure  IV fluid with monitoring with labs.  Infection workup    AVA (acute kidney injury) ([facility])  Assessment & Plan  With creatinine 1.6  Likely related to dehydration  IV fluid and labs daily bladder scan to rule out retention  Check CPK      Malnutrition ([facility])  Assessment & Plan  Cachectic appearance  Nutrition consult  Encourage the patient to quit drinking alcohol and smoking    Tobacco dependence- (present on admission)  Assessment & Plan  I spent 5 minutes counseling the patient on cessation techniques and resources were offered including nicotine patches, gum, along with medical treatment with wellbutrin and chantix. The patient understands continuing to smoke could lead to complications such as lung disease, heart attack, stroke and death.  The benefits of stopping were also presented to him. The patient verbalized understanding. CPT CODE: 02820 (3-10 minutes tobacco counseling).      COPD (chronic obstructive pulmonary disease) ([facility])- (present on admission)  Assessment & Plan  No significant exacerbation  Chest x-ray did not show any signs of infiltrate or or pneumonia  Inhalers, no need for prednisone  Pulmonary referral    Alcohol abuse- (present on admission)  Assessment & Plan  Heavy drinking for years  [facility] protocol  High-dose of thiamine and multivitamins  Encouraged the patient to quit    ACP (advance care planning)  Assessment & Plan  4/26, patient is alert and oriented x 4, discussed the plan of care with the patient, answered all his questions, discussed the CODE STATUS with the patient, patient wants to be full code.  Time 15 minutes        VTE prophylaxis: SCDs/TEDs and heparin ppx

## 2024-04-26 NOTE — ED PROVIDER NOTES
ER Provider Note    Scribed for Chepe Anthony M.d. by Chepe Anthony M.D.. 4/26/2024  12:07 PM    Primary Care Provider: Mark Benito P.A.-C.    CHIEF COMPLAINT  Chief Complaint   Patient presents with    T-5000 GLF    Shortness of Breath    Rib Pain     EXTERNAL RECORDS REVIEWED  No encounters in our system since 2022, when the patient was hospitalized for altered mental status.  Asthma/reactive airway disease was documented at that time, along with alcohol abuse.  Altered mental status was attributed to sepsis which was treated, with normalization of mental status.    HPI/ROS  LIMITATION TO HISTORY       OUTSIDE HISTORIAN(S):      Ady Blake is a 74 y.o. male who presents to the ED complaining of left-sided rib pain after a ground level fall on Sunday, 5 days ago.  He has had left-sided rib pain with associated shortness of breath since then.  He denies loss of consciousness.  He is not on blood thinners.  He does not complain of back pain, fevers, nausea or vomiting.  He reports a history of COPD, since he does not use oxygen at home.  Says he always has a cough, and that he always have ragged breathing, but agrees that it might be worse now after this recent fall.    PAST MEDICAL HISTORY  Past Medical History:   Diagnosis Date    Arthritis     knees and ankles    Back pain     Bronchitis 8/29/2022    Chronic airway obstruction, not elsewhere classified     Dental disorder     upper and lower    Glaucoma     Hiatus hernia syndrome     HTN (hypertension) 6/14/2015    Myocardial infarct (HCC)     Unstable angina (HCC) 6/14/2015       SURGICAL HISTORY  Past Surgical History:   Procedure Laterality Date    INGUINAL HERNIA LAPAROSCOPIC  11/10/2014    Performed by John H Ganser, M.D. at SURGERY [facility] BY LAPAROSCOPY      TONSILLECTOMY         FAMILY HISTORY  No family history on file.    SOCIAL HISTORY   reports that he has been smoking cigarettes. He has a 40 pack-year smoking  "history. He has never used smokeless tobacco. He reports current alcohol use. He reports current drug use.    CURRENT MEDICATIONS  Previous Medications    ACETAMINOPHEN (TYLENOL) 500 MG TAB    Take 500-1,000 mg by mouth every 6 hours as needed. Indications: Pain    ALBUTEROL (VENTOLIN OR PROVENTIL) 108 (90 BASE) MCG/ACT AERS INHALATION AEROSOL    Inhale 1 Puff every four hours as needed for Shortness of Breath.    AMLODIPINE (NORVASC) 5 MG TAB    Take 5 mg by mouth every day.    LOSARTAN (COZAAR) 100 MG TABS    Take 100 mg by mouth every day.    SYMBICORT 160-4.5 MCG/ACT AEROSOL    Inhale 1 Puff 2 times a day.       ALLERGIES  Patient has no known allergies.    PHYSICAL EXAM  VITAL SIGNS: /64   Pulse 85   Temp 36.4 °C (97.6 °F) (Temporal)   Resp 20   Ht 1.651 m (5' 5\")   Wt 54.4 kg (120 lb)   SpO2 94%   BMI 19.97 kg/m²   Pulse ox interpretation: I interpret this pulse ox as normal.  Constitutional: Alert in no apparent distress.  HENT: No signs of trauma, Bilateral external ears normal, Nose normal.   Eyes: Conjunctiva normal, Non-icteric.   Neck: Normal range of motion, Supple, No stridor.   Lymphatic: No lymphadenopathy noted.   Cardiovascular: Regular rate and rhythm, no murmurs.   Thorax & Lungs: Coarse breath sounds bilaterally in all lung fields.  Lung sounds very limited at bilateral bases.  Posterolateral crepitus from obvious rib fracture on the left.  Abdomen: Bowel sounds normal, Soft, No tenderness, No masses, No pulsatile masses. No peritoneal signs.  Skin: Facial bruising, chest and shoulder bruising, left greater than right.  Back: No midline bony tenderness.   Extremities: Intact distal pulses, No edema, No cyanosis.  Musculoskeletal: Good range of motion in all major joints. No or major deformities noted.   Neurologic: Alert , Normal motor function, Normal sensory function, No focal deficits noted.   Psychiatric: Affect normal, Judgment normal, Mood normal.     DIAGNOSTIC " STUDIES    Labs:   Labs Reviewed   CBC WITH DIFFERENTIAL - Abnormal; Notable for the following components:       Result Value    WBC 17.4 (*)     RBC 4.55 (*)     MCH 33.4 (*)     Neutrophils-Polys 17.20 (*)     Lymphocytes 79.30 (*)     Nucleated RBC 0.30 (*)     Lymphs (Absolute) 13.80 (*)     All other components within normal limits   COMP METABOLIC PANEL - Abnormal; Notable for the following components:    Sodium 132 (*)     Potassium 3.5 (*)     Chloride 91 (*)     Co2 14 (*)     Anion Gap 27.0 (*)     Glucose 109 (*)     Bun 100 (*)     Creatinine 1.68 (*)     AST(SGOT) 1279 (*)     ALT(SGPT) 3460 (*)     Alkaline Phosphatase 111 (*)     Total Bilirubin 2.1 (*)     All other components within normal limits   ESTIMATED GFR - Abnormal; Notable for the following components:    GFR (CKD-EPI) 43 (*)     All other components within normal limits   DIAGNOSTIC ALCOHOL   HEPATITIS PANEL ACUTE(4 COMPONENTS)   DIFFERENTIAL MANUAL   PERIPHERAL SMEAR REVIEW   PLATELET ESTIMATE   MORPHOLOGY   PATH INTERP HEME       EKG:   I have independently interpreted this EKG  Results for orders placed or performed during the hospital encounter of 24   EKG   Result Value Ref Range    Report       [facility]    Test Date:  2024  Pt Name:    LINDSAY HILLS               Department: ER  MRN:        [id number]                      Room:        21  Gender:     Male                         Technician: [id number]  :        1950                   Requested By:ER TRIAGE PROTOCOL  Order #:    [id number]                    Reading MD:    Measurements  Intervals                                Axis  Rate:       95                           P:          85  SD:         140                          QRS:        88  QRSD:       97                           T:          57  QT:         360  QTc:        453    Interpretive Statements  Unknown rhythm, irregular rate  Borderline right axis  deviation  Borderline T wave abnormalities  Compared to ECG 08/28/2022 17:29:22  T-wave abnormality now present  Sinus rhythm no longer present  Ventricular premature complex(es) no longer present           Radiology:   The attending emergency physician has independently interpreted the diagnostic imaging associated with this visit and am waiting the final reading from the radiologist.   Preliminary interpretation is a follows:     Radiologist interpretation:   CT-CHEST,ABDOMEN,PELVIS WITH   Final Result      1.  Acute left lateral seventh, eighth, ninth rib fractures.   2.  Moderate superior endplate compression fracture of the T10 vertebral body which appears acute with fracture lines visible.   3.  Ileum enteritis.   4.  Diffuse colitis.   5.  Severe fatty liver infiltration.      DX-CHEST-PORTABLE (1 VIEW)   Final Result      No acute cardiac or pulmonary abnormalities are identified.      CT-LSPINE W/O PLUS RECONS    (Results Pending)   CT-TSPINE W/O PLUS RECONS    (Results Pending)         COURSE & MEDICAL DECISION MAKING     INITIAL ASSESSMENT, COURSE AND PLAN  Care Narrative:     12:07 PM Patient presents to the ED with left chest wall trauma, extensive bruising, and palpable crepitus on exam after ground-level fall 5 days ago. Per triage protocol, screening laboratory tests and a chest x-ray was ordered. Patient evaluated at bedside and discussed plan of care, including the addition of chest CT, since the x-ray is read as unremarkable, but there is obvious crepitus and I am concerned about rib fractures, pulmonary contusion, pneumothorax, as well as fluid overload, given the coarse breath sounds bilaterally. Patient will be treated with supplemental oxygen.     12:33 PM patient has critical high ALT and AST, as well as elevated total bilirubin.  I will change his chest CT to a chest/abdomen/pelvis with IV contrast.  Acute hepatitis panel and alcohol level added.    2:35 PM CT of the chest abdomen and pelvis  shows fractures of the left lateral seventh eighth and ninth ribs, moderate T10 compression fracture which appears acute.  Hepatitis panel is negative.  LFT elevation likely related to alcoholism, with possible component of volume depletion/hypoperfusion.  This patient need a hospitalist admission.     3:12 PM Dr. Miranda agrees to admit.  We agree that respiratory support and fluid hydration of the patient's highest priority is, and that many of his laboratory abnormalities are likely due to correct or at least improve with hydration.  The patient's history of alcoholism, and apparent malnutrition is also of concern.  Hospitalist service also aware that spine CTs are being ordered, hopefully for reconstruction from the already performed scan, based on the new finding of a compression fracture.    ADDITIONAL PROBLEM MANAGED  Untreated COPD, wheezing, metabolic acidosis, unexpected discovery of severe liver disease.    DISPOSITION AND DISCUSSIONS  I have discussed management of the patient with the following physicians and EMILY's: The admitting hospitalist service, as above    DISPOSITION:  Patient will be admitted to Dr. Miranda in guarded condition.      FINAL DIAGNOSIS  1. Hypoxia    2. Closed fracture of multiple ribs of left side, initial encounter    3. LFT elevation    4. Metabolic acidosis            Chepe NEWELL M.D. (Scribe), am scribing for, and in the presence of, Chepe Anthony M.D..    Electronically signed by: Chepe Anthony M.D. (Scribe), 4/26/2024    Chepe NEWELL M.D. personally performed the services described in this documentation, as scribed by Chepe Anthony M.D. in my presence, and it is both accurate and complete.

## 2024-04-26 NOTE — ASSESSMENT & PLAN NOTE
Severely elevated enzymes around thousands   Labs daily  Close monitoring and consider steroid if needed  Ultrasound did not show any acute finding except fatty liver  Improving likely related to alcoholic hepatitis  Holding on MRCP at this time.

## 2024-04-26 NOTE — ASSESSMENT & PLAN NOTE
Likely related to alcoholic, starvation and fall with kidney failure  IV fluids gently, improving on his labs  Infection workup possible aspiration pneumonia:

## 2024-04-26 NOTE — ASSESSMENT & PLAN NOTE
No significant exacerbation  Chest x-ray did not show any signs of infiltrate or or pneumonia  Inhalers,  Pulmonary referral

## 2024-04-26 NOTE — ASSESSMENT & PLAN NOTE
Patient has cachectic appearance, multiple bruises on his body  Patient using cane at home, lives by himself  PT and OT  TSH is normal and vitamin D is pending

## 2024-04-26 NOTE — ED NOTES
Med Rec completed per patient and home pharmacy ([facility])   Allergies reviewed  No ORAL antibiotics in last 30 days    Patient is not taking anticoagulants

## 2024-04-26 NOTE — ED NOTES
Report to ALLAN Akers.     One set blood cx collected by RN, one by phlebotomist.     Hospitalist at bedside.

## 2024-04-26 NOTE — ED NOTES
Pt to T7 via ACT and monitor. VSS on room air. Pt has all belongings at time of transfer. No belongings left in room after transfer.

## 2024-04-27 ENCOUNTER — APPOINTMENT (OUTPATIENT)
Dept: RADIOLOGY | Facility: MEDICAL CENTER | Age: 74
DRG: 189 | End: 2024-04-27
Attending: INTERNAL MEDICINE
Payer: MEDICARE

## 2024-04-27 ENCOUNTER — APPOINTMENT (OUTPATIENT)
Dept: CARDIOLOGY | Facility: MEDICAL CENTER | Age: 74
DRG: 189 | End: 2024-04-27
Attending: INTERNAL MEDICINE
Payer: MEDICARE

## 2024-04-27 ENCOUNTER — APPOINTMENT (OUTPATIENT)
Dept: RADIOLOGY | Facility: MEDICAL CENTER | Age: 74
End: 2024-04-27
Attending: INTERNAL MEDICINE
Payer: MEDICARE

## 2024-04-27 PROBLEM — S22.39XA RIB FRACTURE: Status: ACTIVE | Noted: 2024-04-27

## 2024-04-27 PROBLEM — R53.81 DEBILITY: Status: ACTIVE | Noted: 2024-04-27

## 2024-04-27 PROBLEM — G93.41 METABOLIC ENCEPHALOPATHY: Status: ACTIVE | Noted: 2024-04-27

## 2024-04-27 PROBLEM — S22.079A CLOSED FRACTURE OF TENTH THORACIC VERTEBRA (HCC): Status: ACTIVE | Noted: 2024-04-27

## 2024-04-27 LAB
ALBUMIN SERPL BCP-MCNC: 3.2 G/DL (ref 3.2–4.9)
ALBUMIN/GLOB SERPL: 1.3 G/DL
ALP SERPL-CCNC: 81 U/L (ref 30–99)
ALT SERPL-CCNC: 1775 U/L (ref 2–50)
ANION GAP SERPL CALC-SCNC: 15 MMOL/L (ref 7–16)
ANION GAP SERPL CALC-SCNC: 19 MMOL/L (ref 7–16)
ANISOCYTOSIS BLD QL SMEAR: ABNORMAL
AST SERPL-CCNC: 458 U/L (ref 12–45)
BACTERIA BLD CULT: NORMAL
BACTERIA BLD CULT: NORMAL
BASE EXCESS BLDA CALC-SCNC: -4 MMOL/L (ref -4–3)
BASOPHILS # BLD AUTO: 0 % (ref 0–1.8)
BASOPHILS # BLD: 0 K/UL (ref 0–0.12)
BILIRUB SERPL-MCNC: 1.7 MG/DL (ref 0.1–1.5)
BODY TEMPERATURE: 36.2 CENTIGRADE
BUN SERPL-MCNC: 49 MG/DL (ref 8–22)
BUN SERPL-MCNC: 64 MG/DL (ref 8–22)
C DIFF DNA SPEC QL NAA+PROBE: NEGATIVE
C DIFF TOX A+B STL QL IA: NEGATIVE
C DIFF TOX GENS STL QL NAA+PROBE: NORMAL
CALCIUM ALBUM COR SERPL-MCNC: 8.8 MG/DL (ref 8.5–10.5)
CALCIUM SERPL-MCNC: 8.1 MG/DL (ref 8.5–10.5)
CALCIUM SERPL-MCNC: 8.2 MG/DL (ref 8.5–10.5)
CHLORIDE SERPL-SCNC: 98 MMOL/L (ref 96–112)
CHLORIDE SERPL-SCNC: 99 MMOL/L (ref 96–112)
CO2 SERPL-SCNC: 17 MMOL/L (ref 20–33)
CO2 SERPL-SCNC: 20 MMOL/L (ref 20–33)
COMMENT NL1176: NORMAL
CREAT SERPL-MCNC: 0.91 MG/DL (ref 0.5–1.4)
CREAT SERPL-MCNC: 1.13 MG/DL (ref 0.5–1.4)
CRP SERPL HS-MCNC: 6.27 MG/DL (ref 0–0.75)
EOSINOPHIL # BLD AUTO: 0 K/UL (ref 0–0.51)
EOSINOPHIL NFR BLD: 0 % (ref 0–6.9)
ERYTHROCYTE [DISTWIDTH] IN BLOOD BY AUTOMATED COUNT: 45.2 FL (ref 35.9–50)
FLUAV RNA SPEC QL NAA+PROBE: NEGATIVE
FLUBV RNA SPEC QL NAA+PROBE: NEGATIVE
GFR SERPLBLD CREATININE-BSD FMLA CKD-EPI: 69 ML/MIN/1.73 M 2
GFR SERPLBLD CREATININE-BSD FMLA CKD-EPI: 89 ML/MIN/1.73 M 2
GLOBULIN SER CALC-MCNC: 2.5 G/DL (ref 1.9–3.5)
GLUCOSE BLD STRIP.AUTO-MCNC: 152 MG/DL (ref 65–99)
GLUCOSE SERPL-MCNC: 110 MG/DL (ref 65–99)
GLUCOSE SERPL-MCNC: 146 MG/DL (ref 65–99)
HCO3 BLDA-SCNC: 20 MMOL/L (ref 17–25)
HCT VFR BLD AUTO: 32.8 % (ref 42–52)
HGB BLD-MCNC: 11.8 G/DL (ref 14–18)
HYPOCHROMIA BLD QL SMEAR: ABNORMAL
INHALED O2 FLOW RATE: ABNORMAL L/MIN
INR PPP: 1.18 (ref 0.87–1.13)
LACTATE SERPL-SCNC: 1 MMOL/L (ref 0.5–2)
LACTATE SERPL-SCNC: 1.6 MMOL/L (ref 0.5–2)
LV EJECT FRACT  99904: 65
LYMPHOCYTES # BLD AUTO: 6.63 K/UL (ref 1–4.8)
LYMPHOCYTES NFR BLD: 65 % (ref 22–41)
MAGNESIUM SERPL-MCNC: 2.4 MG/DL (ref 1.5–2.5)
MANUAL DIFF BLD: NORMAL
MCH RBC QN AUTO: 33.3 PG (ref 27–33)
MCHC RBC AUTO-ENTMCNC: 36 G/DL (ref 32.3–36.5)
MCV RBC AUTO: 92.7 FL (ref 81.4–97.8)
METAMYELOCYTES NFR BLD MANUAL: 0.9 %
MONOCYTES # BLD AUTO: 0.52 K/UL (ref 0–0.85)
MONOCYTES NFR BLD AUTO: 5.1 % (ref 0–13.4)
MORPHOLOGY BLD-IMP: NORMAL
MYELOCYTES NFR BLD MANUAL: 1.7 %
NEUTROPHILS # BLD AUTO: 2.78 K/UL (ref 1.82–7.42)
NEUTROPHILS NFR BLD: 26.5 % (ref 44–72)
NEUTS BAND NFR BLD MANUAL: 0.8 % (ref 0–10)
NRBC # BLD AUTO: 0.03 K/UL
NRBC BLD-RTO: 0.3 /100 WBC (ref 0–0.2)
PCO2 BLDA: 33.3 MMHG (ref 26–37)
PCO2 TEMP ADJ BLDA: 32.2 MMHG (ref 26–37)
PH BLDA: 7.41 [PH] (ref 7.4–7.5)
PH TEMP ADJ BLDA: 7.42 [PH] (ref 7.4–7.5)
PHOSPHATE SERPL-MCNC: 1.4 MG/DL (ref 2.5–4.5)
PHOSPHATE SERPL-MCNC: 2.9 MG/DL (ref 2.5–4.5)
PLATELET # BLD AUTO: 139 K/UL (ref 164–446)
PLATELET BLD QL SMEAR: NORMAL
PMV BLD AUTO: 10.9 FL (ref 9–12.9)
PO2 BLDA: 24.9 MMHG (ref 64–87)
PO2 TEMP ADJ BLDA: 23.5 MMHG (ref 64–87)
POTASSIUM SERPL-SCNC: 2.9 MMOL/L (ref 3.6–5.5)
POTASSIUM SERPL-SCNC: 3.9 MMOL/L (ref 3.6–5.5)
PROT SERPL-MCNC: 5.7 G/DL (ref 6–8.2)
PROTHROMBIN TIME: 15.2 SEC (ref 12–14.6)
RBC # BLD AUTO: 3.54 M/UL (ref 4.7–6.1)
RBC BLD AUTO: PRESENT
RSV RNA SPEC QL NAA+PROBE: NEGATIVE
SAO2 % BLDA: 38 % (ref 93–99)
SARS-COV-2 RNA RESP QL NAA+PROBE: NOTDETECTED
SIGNIFICANT IND 70042: NORMAL
SIGNIFICANT IND 70042: NORMAL
SITE SITE: NORMAL
SITE SITE: NORMAL
SMUDGE CELLS BLD QL SMEAR: NORMAL
SODIUM SERPL-SCNC: 134 MMOL/L (ref 135–145)
SODIUM SERPL-SCNC: 134 MMOL/L (ref 135–145)
SOURCE SOURCE: NORMAL
SOURCE SOURCE: NORMAL
SPECIMEN SOURCE: NORMAL
TROPONIN T SERPL-MCNC: 19 NG/L (ref 6–19)
TSH SERPL DL<=0.005 MIU/L-ACNC: 1.65 UIU/ML (ref 0.38–5.33)
WBC # BLD AUTO: 10.2 K/UL (ref 4.8–10.8)

## 2024-04-27 PROCEDURE — A9270 NON-COVERED ITEM OR SERVICE: HCPCS | Performed by: INTERNAL MEDICINE

## 2024-04-27 PROCEDURE — 700111 HCHG RX REV CODE 636 W/ 250 OVERRIDE (IP): Mod: JZ | Performed by: STUDENT IN AN ORGANIZED HEALTH CARE EDUCATION/TRAINING PROGRAM

## 2024-04-27 PROCEDURE — 85007 BL SMEAR W/DIFF WBC COUNT: CPT

## 2024-04-27 PROCEDURE — 71045 X-RAY EXAM CHEST 1 VIEW: CPT

## 2024-04-27 PROCEDURE — 85610 PROTHROMBIN TIME: CPT

## 2024-04-27 PROCEDURE — 700105 HCHG RX REV CODE 258: Performed by: STUDENT IN AN ORGANIZED HEALTH CARE EDUCATION/TRAINING PROGRAM

## 2024-04-27 PROCEDURE — 83735 ASSAY OF MAGNESIUM: CPT

## 2024-04-27 PROCEDURE — 84484 ASSAY OF TROPONIN QUANT: CPT

## 2024-04-27 PROCEDURE — 700105 HCHG RX REV CODE 258: Performed by: INTERNAL MEDICINE

## 2024-04-27 PROCEDURE — 700102 HCHG RX REV CODE 250 W/ 637 OVERRIDE(OP): Performed by: INTERNAL MEDICINE

## 2024-04-27 PROCEDURE — 84443 ASSAY THYROID STIM HORMONE: CPT

## 2024-04-27 PROCEDURE — 770020 HCHG ROOM/CARE - TELE (206)

## 2024-04-27 PROCEDURE — 94640 AIRWAY INHALATION TREATMENT: CPT

## 2024-04-27 PROCEDURE — 82962 GLUCOSE BLOOD TEST: CPT

## 2024-04-27 PROCEDURE — 99233 SBSQ HOSP IP/OBS HIGH 50: CPT | Performed by: INTERNAL MEDICINE

## 2024-04-27 PROCEDURE — 80048 BASIC METABOLIC PNL TOTAL CA: CPT

## 2024-04-27 PROCEDURE — 80053 COMPREHEN METABOLIC PANEL: CPT

## 2024-04-27 PROCEDURE — 700111 HCHG RX REV CODE 636 W/ 250 OVERRIDE (IP): Mod: JZ

## 2024-04-27 PROCEDURE — 83605 ASSAY OF LACTIC ACID: CPT

## 2024-04-27 PROCEDURE — 93306 TTE W/DOPPLER COMPLETE: CPT

## 2024-04-27 PROCEDURE — 36415 COLL VENOUS BLD VENIPUNCTURE: CPT

## 2024-04-27 PROCEDURE — 700101 HCHG RX REV CODE 250: Performed by: INTERNAL MEDICINE

## 2024-04-27 PROCEDURE — 0241U HCHG SARS-COV-2 COVID-19 NFCT DS RESP RNA 4 TRGT MIC: CPT

## 2024-04-27 PROCEDURE — 700111 HCHG RX REV CODE 636 W/ 250 OVERRIDE (IP): Performed by: INTERNAL MEDICINE

## 2024-04-27 PROCEDURE — 93306 TTE W/DOPPLER COMPLETE: CPT | Mod: 26 | Performed by: INTERNAL MEDICINE

## 2024-04-27 PROCEDURE — 99233 SBSQ HOSP IP/OBS HIGH 50: CPT | Performed by: REGISTERED NURSE

## 2024-04-27 PROCEDURE — 82803 BLOOD GASES ANY COMBINATION: CPT

## 2024-04-27 PROCEDURE — 84100 ASSAY OF PHOSPHORUS: CPT | Mod: 91

## 2024-04-27 PROCEDURE — 70450 CT HEAD/BRAIN W/O DYE: CPT

## 2024-04-27 PROCEDURE — 86140 C-REACTIVE PROTEIN: CPT

## 2024-04-27 PROCEDURE — 85027 COMPLETE CBC AUTOMATED: CPT

## 2024-04-27 PROCEDURE — 76705 ECHO EXAM OF ABDOMEN: CPT

## 2024-04-27 RX ORDER — LIDOCAINE 4 G/G
1 PATCH TOPICAL EVERY 24 HOURS
Status: DISCONTINUED | OUTPATIENT
Start: 2024-04-27 | End: 2024-05-08 | Stop reason: HOSPADM

## 2024-04-27 RX ORDER — IPRATROPIUM BROMIDE AND ALBUTEROL SULFATE 2.5; .5 MG/3ML; MG/3ML
3 SOLUTION RESPIRATORY (INHALATION)
Status: DISCONTINUED | OUTPATIENT
Start: 2024-04-27 | End: 2024-05-08 | Stop reason: HOSPADM

## 2024-04-27 RX ORDER — PREDNISONE 20 MG/1
40 TABLET ORAL DAILY
Status: DISPENSED | OUTPATIENT
Start: 2024-04-27 | End: 2024-05-01

## 2024-04-27 RX ORDER — QUETIAPINE FUMARATE 25 MG/1
12.5 TABLET, FILM COATED ORAL NIGHTLY
Status: DISCONTINUED | OUTPATIENT
Start: 2024-04-27 | End: 2024-04-27

## 2024-04-27 RX ORDER — QUETIAPINE FUMARATE 25 MG/1
12.5 TABLET, FILM COATED ORAL 2 TIMES DAILY PRN
Status: DISCONTINUED | OUTPATIENT
Start: 2024-04-27 | End: 2024-04-29

## 2024-04-27 RX ORDER — QUETIAPINE FUMARATE 25 MG/1
25 TABLET, FILM COATED ORAL NIGHTLY
Status: DISCONTINUED | OUTPATIENT
Start: 2024-04-27 | End: 2024-04-27

## 2024-04-27 RX ORDER — OXYCODONE HYDROCHLORIDE 5 MG/1
5 TABLET ORAL EVERY 4 HOURS PRN
Status: DISCONTINUED | OUTPATIENT
Start: 2024-04-27 | End: 2024-05-08 | Stop reason: HOSPADM

## 2024-04-27 RX ORDER — IPRATROPIUM BROMIDE AND ALBUTEROL SULFATE 2.5; .5 MG/3ML; MG/3ML
3 SOLUTION RESPIRATORY (INHALATION)
Status: DISCONTINUED | OUTPATIENT
Start: 2024-04-27 | End: 2024-04-28

## 2024-04-27 RX ORDER — POTASSIUM CHLORIDE 7.45 MG/ML
10 INJECTION INTRAVENOUS
Status: DISCONTINUED | OUTPATIENT
Start: 2024-04-27 | End: 2024-04-27

## 2024-04-27 RX ORDER — LORAZEPAM 2 MG/ML
0.5 INJECTION INTRAMUSCULAR EVERY 6 HOURS PRN
Status: DISCONTINUED | OUTPATIENT
Start: 2024-04-27 | End: 2024-04-30

## 2024-04-27 RX ADMIN — IPRATROPIUM BROMIDE AND ALBUTEROL SULFATE 3 ML: 2.5; .5 SOLUTION RESPIRATORY (INHALATION) at 15:01

## 2024-04-27 RX ADMIN — PIPERACILLIN SODIUM AND TAZOBACTAM SODIUM 3.38 G: 3; .375 INJECTION, POWDER, LYOPHILIZED, FOR SOLUTION INTRAVENOUS at 04:47

## 2024-04-27 RX ADMIN — THIAMINE HYDROCHLORIDE 250 MG: 100 INJECTION, SOLUTION INTRAMUSCULAR; INTRAVENOUS at 17:11

## 2024-04-27 RX ADMIN — POTASSIUM PHOSPHATE, MONOBASIC AND POTASSIUM PHOSPHATE, DIBASIC 30 MMOL: 224; 236 INJECTION, SOLUTION, CONCENTRATE INTRAVENOUS at 08:32

## 2024-04-27 RX ADMIN — IPRATROPIUM BROMIDE AND ALBUTEROL SULFATE 3 ML: 2.5; .5 SOLUTION RESPIRATORY (INHALATION) at 07:15

## 2024-04-27 RX ADMIN — IPRATROPIUM BROMIDE AND ALBUTEROL SULFATE 3 ML: 2.5; .5 SOLUTION RESPIRATORY (INHALATION) at 10:33

## 2024-04-27 RX ADMIN — SODIUM CHLORIDE: 9 INJECTION, SOLUTION INTRAVENOUS at 03:11

## 2024-04-27 RX ADMIN — LIDOCAINE 1 PATCH: 4 PATCH TOPICAL at 13:39

## 2024-04-27 RX ADMIN — IPRATROPIUM BROMIDE AND ALBUTEROL SULFATE 3 ML: 2.5; .5 SOLUTION RESPIRATORY (INHALATION) at 22:18

## 2024-04-27 RX ADMIN — THIAMINE HYDROCHLORIDE 250 MG: 100 INJECTION, SOLUTION INTRAMUSCULAR; INTRAVENOUS at 08:55

## 2024-04-27 RX ADMIN — PIPERACILLIN SODIUM AND TAZOBACTAM SODIUM 3.38 G: 3; .375 INJECTION, POWDER, LYOPHILIZED, FOR SOLUTION INTRAVENOUS at 14:48

## 2024-04-27 RX ADMIN — LORAZEPAM 2 MG: 2 TABLET ORAL at 01:05

## 2024-04-27 RX ADMIN — THIAMINE HYDROCHLORIDE 250 MG: 100 INJECTION, SOLUTION INTRAMUSCULAR; INTRAVENOUS at 13:43

## 2024-04-27 RX ADMIN — POTASSIUM CHLORIDE 10 MEQ: 10 INJECTION, SOLUTION INTRAVENOUS at 06:18

## 2024-04-27 RX ADMIN — ENOXAPARIN SODIUM 40 MG: 100 INJECTION SUBCUTANEOUS at 17:10

## 2024-04-27 RX ADMIN — IPRATROPIUM BROMIDE AND ALBUTEROL SULFATE 3 ML: 2.5; .5 SOLUTION RESPIRATORY (INHALATION) at 18:51

## 2024-04-27 RX ADMIN — TIOTROPIUM BROMIDE INHALATION SPRAY 5 MCG: 3.12 SPRAY, METERED RESPIRATORY (INHALATION) at 08:00

## 2024-04-27 RX ADMIN — SODIUM CHLORIDE: 9 INJECTION, SOLUTION INTRAVENOUS at 13:39

## 2024-04-27 RX ADMIN — PIPERACILLIN SODIUM AND TAZOBACTAM SODIUM 3.38 G: 3; .375 INJECTION, POWDER, LYOPHILIZED, FOR SOLUTION INTRAVENOUS at 21:20

## 2024-04-27 RX ADMIN — MOMETASONE FUROATE AND FORMOTEROL FUMARATE DIHYDRATE 2 PUFF: 100; 5 AEROSOL RESPIRATORY (INHALATION) at 21:17

## 2024-04-27 RX ADMIN — LORAZEPAM 0.5 MG: 2 INJECTION, SOLUTION INTRAMUSCULAR; INTRAVENOUS at 23:26

## 2024-04-27 RX ADMIN — LORAZEPAM 1 MG: 1 TABLET ORAL at 03:08

## 2024-04-27 ASSESSMENT — LIFESTYLE VARIABLES
ANXIETY: MILDLY ANXIOUS
AUDITORY DISTURBANCES: NOT PRESENT
VISUAL DISTURBANCES: NOT PRESENT
NAUSEA AND VOMITING: NO NAUSEA AND NO VOMITING
TOTAL SCORE: 4
AGITATION: NORMAL ACTIVITY
PAROXYSMAL SWEATS: BARELY PERCEPTIBLE SWEATING. PALMS MOIST
TOTAL SCORE: 13
ORIENTATION AND CLOUDING OF SENSORIUM: DATE DISORIENTATION BY NO MORE THAN TWO CALENDAR DAYS
ORIENTATION AND CLOUDING OF SENSORIUM: ORIENTED AND CAN DO SERIAL ADDITIONS
NAUSEA AND VOMITING: NO NAUSEA AND NO VOMITING
ORIENTATION AND CLOUDING OF SENSORIUM: ORIENTED AND CAN DO SERIAL ADDITIONS
ORIENTATION AND CLOUDING OF SENSORIUM: CANNOT DO SERIAL ADDITIONS OR IS UNCERTAIN ABOUT DATE
PAROXYSMAL SWEATS: NO SWEAT VISIBLE
TREMOR: MODERATE TREMOR WITH ARMS EXTENDED
TREMOR: NO TREMOR
TOTAL SCORE: 0
VISUAL DISTURBANCES: NOT PRESENT
VISUAL DISTURBANCES: NOT PRESENT
AUDITORY DISTURBANCES: NOT PRESENT
VISUAL DISTURBANCES: NOT PRESENT
HEADACHE, FULLNESS IN HEAD: NOT PRESENT
HEADACHE, FULLNESS IN HEAD: VERY MILD
HEADACHE, FULLNESS IN HEAD: NOT PRESENT
ANXIETY: *
AGITATION: NORMAL ACTIVITY
HEADACHE, FULLNESS IN HEAD: NOT PRESENT
TREMOR: MODERATE TREMOR WITH ARMS EXTENDED
PAROXYSMAL SWEATS: BARELY PERCEPTIBLE SWEATING. PALMS MOIST
TREMOR: NO TREMOR
AGITATION: NORMAL ACTIVITY
ANXIETY: MILDLY ANXIOUS
AUDITORY DISTURBANCES: NOT PRESENT
TOTAL SCORE: 4
NAUSEA AND VOMITING: NO NAUSEA AND NO VOMITING
TOTAL SCORE: 8
ORIENTATION AND CLOUDING OF SENSORIUM: CANNOT DO SERIAL ADDITIONS OR IS UNCERTAIN ABOUT DATE
AGITATION: *
NAUSEA AND VOMITING: NO NAUSEA AND NO VOMITING
PAROXYSMAL SWEATS: BARELY PERCEPTIBLE SWEATING. PALMS MOIST
AGITATION: *
NAUSEA AND VOMITING: NO NAUSEA AND NO VOMITING
PAROXYSMAL SWEATS: NO SWEAT VISIBLE
AUDITORY DISTURBANCES: NOT PRESENT
HEADACHE, FULLNESS IN HEAD: NOT PRESENT
AUDITORY DISTURBANCES: NOT PRESENT
TREMOR: TREMOR NOT VISIBLE BUT CAN BE FELT, FINGERTIP TO FINGERTIP
VISUAL DISTURBANCES: NOT PRESENT
ANXIETY: *
ANXIETY: NO ANXIETY (AT EASE)

## 2024-04-27 ASSESSMENT — FIBROSIS 4 INDEX: FIB4 SCORE: 8.22

## 2024-04-27 NOTE — CARE PLAN
Problem: Bronchoconstriction  Goal: Improve in air movement and diminished wheezing  Description: Target End Date:  2 to 3 days    1.  Implement inhaled treatments  2.  Evaluate and manage medication effects  Outcome: Progressing     Problem: Hyperinflation  Goal: Prevent or improve atelectasis  Description: Target End Date:  3 to 4 days    1. Instruct incentive spirometry usage  2.  Perform hyperinflation therapy as indicated  Outcome: Not Progressing   No wheezing noted. Unable to do PEP due to lethargy

## 2024-04-27 NOTE — PROGRESS NOTES
ISOLATION PRECAUTIONS EDUCATION    Educated PATIENT, FAMILY, S.O: patient on isolation for RSV    Educated on reason for isolation, how the infection may be transmitted, and how to help prevent transmission to others. Educated precautions involves staff and visitors wearing PPE, following Standard Precautions and performing meticulous hand hygiene in order to prevent transmission of infection.     Contact Precautions: Educated that Contact Precautions involves staff and visitors wearing gowns and gloves when in the patient room.     In addition, educated that the patient may leave the room, but prior to exiting the patient room each time, the patient needs to have on a fresh patient gown, ensure the potentially infectious area is covered, and perform hand hygiene with soap and water or alcohol-based hand rub, immediately prior to exiting the room.    Droplet Precautions: Educated that Droplet Precautions involves staff and visitors wearing PPE to include a surgical mask when in the patient room.     In addition, educated that they may leave their room, but prior to exiting the patient room each time, the patient needs to have on a fresh patient gown, a surgical mask must be worn by the patient while out of the patient room, and perform hand hygiene immediately prior to exiting the room.     Patient transport and mobilization on unit  Educated that they may leave their room, but prior to exiting, the patient needs to have on a fresh patient gown, ensure the potentially infectious area is covered, performing appropriate hand hygiene immediately prior to exiting the room.

## 2024-04-27 NOTE — ASSESSMENT & PLAN NOTE
Patient has significant generalized weakness with debility and recurrent falls  PT and OT evaluation  TSH is normal, would likely patient needs placement

## 2024-04-27 NOTE — PROGRESS NOTES
"      Trauma tertiary and SBIRT per trauma guidelines and quality measures. This note does not constitute as a formal trauma consult. Please defer all management to primary team.     Mental status adequate for full examination?: No    Spine cleared (radiologically and/or clinically): Yes    REVIEW OF SYSTEMS:  Review of Systems   Unable to perform ROS: Mental acuity   Presented encephalopathic, unable to participate in exam. Moans to pain.     PHYSICAL EXAMINATION:  /70   Pulse 86   Temp 36.7 °C (98.1 °F) (Temporal)   Resp (!) 25   Ht 1.651 m (5' 5\")   Wt 50.5 kg (111 lb 5.3 oz)   SpO2 92%   BMI 18.53 kg/m²   Physical Exam  Vitals and nursing note reviewed.   Constitutional:       General: He is not in acute distress.     Appearance: He is ill-appearing and toxic-appearing.      Interventions: Nasal cannula in place.   HENT:      Head: Normocephalic.      Nose: Nose normal.      Mouth/Throat:      Mouth: Mucous membranes are moist.      Pharynx: Oropharynx is clear.   Eyes:      Extraocular Movements: Extraocular movements intact.      Conjunctiva/sclera: Conjunctivae normal.      Pupils: Pupils are equal, round, and reactive to light.      Comments: Left side periorbital ecchymosis, no deformity palpated or visualized.    Neck:      Trachea: Trachea normal.   Cardiovascular:      Rate and Rhythm: Normal rate and regular rhythm.      Pulses: Normal pulses.   Pulmonary:      Effort: Pulmonary effort is normal. No respiratory distress.   Chest:      Chest wall: Tenderness present. No deformity or swelling.   Abdominal:      General: Abdomen is flat. Bowel sounds are normal.      Palpations: Abdomen is soft.   Musculoskeletal:         General: Normal range of motion.      Cervical back: Neck supple.      Thoracic back: Tenderness and bony tenderness present.   Skin:     General: Skin is warm and dry.      Capillary Refill: Capillary refill takes less than 2 seconds.   Neurological:      General: No focal " deficit present.      Mental Status: He is oriented to person, place, and time. Mental status is at baseline. He is lethargic.      GCS: GCS eye subscore is 1. GCS verbal subscore is 2. GCS motor subscore is 4.   Psychiatric:         Mood and Affect: Mood normal.         LABORATORY VALUES:  Recent Labs     04/26/24  1142 04/27/24  0802   WBC 17.4* 10.2   RBC 4.55* 3.54*   HEMOGLOBIN 15.2 11.8*   HEMATOCRIT 42.2 32.8*   MCV 92.7 92.7   MCH 33.4* 33.3*   MCHC 36.0 36.0   RDW 45.5 45.2   PLATELETCT 193 139*   MPV 11.0 10.9     Recent Labs     04/26/24  1142 04/26/24  1942 04/27/24  0401   SODIUM 132* 131* 134*   POTASSIUM 3.5* 4.1 2.9*   CHLORIDE 91* 95* 98   CO2 14* 20 17*   GLUCOSE 109* 147* 110*   * 77* 64*   CREATININE 1.68* 1.24 1.13   CALCIUM 9.1 8.2* 8.2*     Recent Labs     04/26/24  1142 04/27/24  0401   ASTSGOT 1279* 458*   ALTSGPT 3460* 1775*   TBILIRUBIN 2.1* 1.7*   ALKPHOSPHAT 111* 81   GLOBULIN 3.2 2.5   INR  --  1.18*     Recent Labs     04/27/24  0401   INR 1.18*       IMAGING:  DX-CHEST-LIMITED (1 VIEW)   Final Result      No acute process.      US-RUQ   Final Result         1.  Dilatation the right renal pelvis, appearance most compatible with mild hydronephrosis.   2.  Echogenic liver, compatible with fatty change versus fibrosis.   3.  Atherosclerosis      CT-LSPINE W/O PLUS RECONS   Final Result      Mild multilevel degenerative disease without definite fracture.      CT-TSPINE W/O PLUS RECONS   Final Result      1.  Acute superior endplate compression fracture of the T10 vertebral body with moderate height loss.      CT-CHEST,ABDOMEN,PELVIS WITH   Final Result      1.  Acute left lateral seventh, eighth, ninth rib fractures.   2.  Moderate superior endplate compression fracture of the T10 vertebral body which appears acute with fracture lines visible.   3.  Ileum enteritis.   4.  Diffuse colitis.   5.  Severe fatty liver infiltration.      DX-CHEST-PORTABLE (1 VIEW)   Final Result      No  acute cardiac or pulmonary abnormalities are identified.      RO-ORFEOXB-E/O    (Results Pending)   EC-ECHOCARDIOGRAM COMPLETE W/O CONT    (Results Pending)         CAGE Results: not completed Blood Alcohol>0.08: not completed       PDI Score: Not completed  (Score > 23 = Psychiatry consult)    All current laboratory studies/radiology exams reviewed: Yes    Medications reconciliation has been reviewed: No, per primary team.    Completed Trauma Related Consultations:  None.     Injuries:  Acute left lateral seventh, eighth, ninth rib fractures.  Moderate superior endplate compression fracture of the T10 vertebral body which appears acute with fracture lines visible.    Newly identified injuries:  None, difficult to examine, patient is somnolent.     Recommendations:    #Acute fractures of right 7th, 8th, 9th ribs.  Aggressive pulmonary hygiene, multimodal pain regimen as appropriate.  Tylenol 1g Q 6 hours.  Skelaxin 400 mg q 8 hours  Lidocaine patches  Daily CXR  Aggressive pulmonary hygiene.    # Acute superior endplate compression fracture of the T10 vertebral body with moderate height loss.   Recommend Spine consultation and multimodal pain regimen.    Trauma tertiary and SBIRT per trauma guidelines and quality measures. This note does not constitute as a formal trauma consult. Please defer all management to primary team.

## 2024-04-27 NOTE — PROGRESS NOTES
[facility] Medicine Daily Progress Note    Date of Service  4/27/2024    Chief Complaint  Ady Blake is a 74 y.o. male admitted 4/26/2024 with fall    Hospital Course:    74-year-old male with history of smoking, alcoholism and hypertension who presented 4/26 with fall. Patient states he is drinking beer every day and smoking half a pack. Patient tripped when he was walking and using cane and fell on his left side, denied any dizziness or lightheadedness, denied any loss of consciousness. Patient lives by himself and usually does all daily activities by himself. Patient was noticed to have cachectic appearance and bruises on his body. Vital signs were stable and patient was on room air. Chest x-ray showed signs of COPD however no signs of pneumonia. CT lumbar, thoracic showed acute compression fracture of T10, CT abdomen showed rib fracture on the left side, seventh, eighth and ninth with diffuse colitis and severe fatty liver disease. Labs showed leukocytosis 17 with elevated liver enzymes ALT 3460 and AST 1279 with total bilirubin 2.1 also patient was found to have bicarb 14 with elevated anion gap and potassium 3.5, IV fluid with CIWA protocol was initiated.       Interval Problem Update  -Evaluated examined the patient at bedside, patient is very lethargic, confused.  -Patient is very lethargic, discontinue CIWA protocol and start with Ativan as needed for anxiety and tremor.    -N.p.o. and speech   -Case was discussed Dr. Xie neurosurgeon for T10 fracture, no intervention needed, continue brace and pain management.  -Bladder scan did not show retention, continue IV fluid  -Procalcitonin is very elevated, start with Zosyn, waiting for blood culture.  -Patient is on room air, patient has wheezing, continue inhalers  Chest x-ray reviewed personally, no signs of-infiltration except signs of emphysema.  -Patient has delirium, will order CT scan to rule out bleeding in his brain, continue pharmacological  treatment, avoid aggressive sedation medication.  -Case was discussed in detail with the patient's son, answered all his questions.  He is discharged  -Patient has complicated medical problems and high suspicious for deterioration, consider ICU transfer if needed.        I have discussed this patient's plan of care and discharge plan at IDT rounds today with Case Management, Nursing, Nursing leadership, and other members of the IDT team.    Consultants/Specialty  None   Code Status  Full Code    Disposition  The patient is not medically cleared for discharge to home or a post-acute facility.  Anticipate discharge to: skilled nursing facility    I have placed the appropriate orders for post-discharge needs.    Review of Systems  Review of Systems   Unable to perform ROS: Mental status change        Physical Exam  Temp:  [36.3 °C (97.3 °F)-36.7 °C (98.1 °F)] 36.4 °C (97.5 °F)  Pulse:  [74-98] 92  Resp:  [18-28] 28  BP: (102-162)/(51-99) 162/89  SpO2:  [89 %-97 %] 90 %    Physical Exam  Constitutional:       General: He is in acute distress.      Appearance: He is ill-appearing.      Comments: Cachectic appearance   Eyes:      General: No scleral icterus.  Cardiovascular:      Rate and Rhythm: Normal rate.      Heart sounds: No murmur heard.  Pulmonary:      Effort: No respiratory distress.      Breath sounds: No wheezing.   Abdominal:      General: There is no distension.      Tenderness: There is no abdominal tenderness. There is no right CVA tenderness, left CVA tenderness or guarding.   Musculoskeletal:      Right lower leg: No edema.      Left lower leg: No edema.   Lymphadenopathy:      Cervical: No cervical adenopathy.   Skin:     Coloration: Skin is not jaundiced.      Findings: Bruising and lesion present. No rash.   Neurological:      General: No focal deficit present.      Mental Status: He is alert. Mental status is at baseline. He is disoriented.      Cranial Nerves: No cranial nerve deficit.      Motor:  No weakness.      Gait: Gait normal.         Fluids    Intake/Output Summary (Last 24 hours) at 4/27/2024 1525  Last data filed at 4/27/2024 0400  Gross per 24 hour   Intake 200 ml   Output 300 ml   Net -100 ml       Laboratory  Recent Labs     04/26/24  1142 04/27/24  0802   WBC 17.4* 10.2   RBC 4.55* 3.54*   HEMOGLOBIN 15.2 11.8*   HEMATOCRIT 42.2 32.8*   MCV 92.7 92.7   MCH 33.4* 33.3*   MCHC 36.0 36.0   RDW 45.5 45.2   PLATELETCT 193 139*   MPV 11.0 10.9     Recent Labs     04/26/24  1942 04/27/24  0401 04/27/24  1421   SODIUM 131* 134* 134*   POTASSIUM 4.1 2.9* 3.9   CHLORIDE 95* 98 99   CO2 20 17* 20   GLUCOSE 147* 110* 146*   BUN 77* 64* 49*   CREATININE 1.24 1.13 0.91   CALCIUM 8.2* 8.2* 8.1*     Recent Labs     04/27/24  0401   INR 1.18*               Imaging  EC-ECHOCARDIOGRAM COMPLETE W/O CONT         DX-CHEST-LIMITED (1 VIEW)   Final Result      No acute process.      US-RUQ   Final Result         1.  Dilatation the right renal pelvis, appearance most compatible with mild hydronephrosis.   2.  Echogenic liver, compatible with fatty change versus fibrosis.   3.  Atherosclerosis      CT-LSPINE W/O PLUS RECONS   Final Result      Mild multilevel degenerative disease without definite fracture.      CT-TSPINE W/O PLUS RECONS   Final Result      1.  Acute superior endplate compression fracture of the T10 vertebral body with moderate height loss.      CT-CHEST,ABDOMEN,PELVIS WITH   Final Result      1.  Acute left lateral seventh, eighth, ninth rib fractures.   2.  Moderate superior endplate compression fracture of the T10 vertebral body which appears acute with fracture lines visible.   3.  Ileum enteritis.   4.  Diffuse colitis.   5.  Severe fatty liver infiltration.      DX-CHEST-PORTABLE (1 VIEW)   Final Result      No acute cardiac or pulmonary abnormalities are identified.      IA-SPOQVBQ-L/O    (Results Pending)   CT-HEAD W/O    (Results Pending)        Assessment/Plan  * Acute respiratory failure with  hypoxia (HCC)- (present on admission)  Assessment & Plan  Patient is on room air  Likely related to COPD  Inhalers    Rib fracture  Assessment & Plan  Due to falls  No pneumothorax  Pain control  Respiratory hygiene    Elevated liver enzymes  Assessment & Plan  Severely elevated enzymes around thousands   MRCP is pending  Labs daily  Close monitoring and consider steroid if needed  Ultrasound did not show any acute finding except fatty liver  Improving likely related to alcoholic hepatitis    Fall  Assessment & Plan  Patient has cachectic appearance, multiple bruises on his body  Patient using cane at home, lives by himself  PT and OT  TSH is normal and vitamin D is pending    Metabolic encephalopathy  Assessment & Plan  Patient has delirium  Likely related to alcoholism, infection, dehydration and hospitalization  CT head to rule out intracranial bleeding  Nonpharmacological treatment  Avoid aggressive sedation and avoid antihistamine    Debility  Assessment & Plan  Patient has significant generalized weakness with debility and recurrent falls  PT and OT evaluation  Check vitamin D and TSH    Closed fracture of tenth thoracic vertebra (HCC)  Assessment & Plan  From fall  Case was discussed with Dr. Xie neurosurgeon, no intervention needed, brace  Pain control and PT and OT    Leukocytosis  Assessment & Plan  Likely related to stress  No signs of infection or fever, holding on antibiotics at this time    Metabolic acidosis  Assessment & Plan  Likely related to alcoholic, starvation and fall with kidney failure  IV fluid with monitoring with labs.  Infection workup    AVA (acute kidney injury) ([facility])  Assessment & Plan  With creatinine 1.6  Likely related to dehydration  IV fluid and labs daily bladder scan to rule out retention  CPK is normal    Malnutrition ([facility])  Assessment & Plan  Cachectic appearance  Nutrition consult  Encourage the patient to quit drinking alcohol and smoking    Tobacco dependence- (present  on admission)  Assessment & Plan  Smoking cessation  Nicotine patch    COPD (chronic obstructive pulmonary disease) (HCC)- (present on admission)  Assessment & Plan  No significant exacerbation  Chest x-ray did not show any signs of infiltrate or or pneumonia  Inhalers, no need for prednisone  Pulmonary referral    Alcohol abuse- (present on admission)  Assessment & Plan  Heavy drinking for years  [facility] protocol  High-dose of thiamine and multivitamins  Encouraged the patient to quit    ACP (advance care planning)  Assessment & Plan  4/26, patient is alert and oriented x 4, discussed the plan of care with the patient, answered all his questions, discussed the CODE STATUS with the patient, patient wants to be full code.  Time 15 minutes         VTE prophylaxis:   SCDs/TEDs   enoxaparin ppx      I have performed a physical exam and reviewed and updated ROS and Plan today (4/27/2024). In review of yesterday's note (4/26/2024), there are no changes except as documented above.    Greater than 51 minutes spent prepping to see patient (e.g. review of tests) obtaining and/or reviewing separately obtained history. Performing a medically appropriate examination and/ evaluation.  Counseling and educating the patient/family/caregiver.  Ordering medications, tests, or procedures.  Referring and communicating with other health care professionals.  Documenting clinical information in EPIC.  Independently interpreting results and communicating results to patient/family/caregiver.  Care coordination

## 2024-04-27 NOTE — THERAPY
Speech Language Therapy Contact Note    Patient Name: Ady Blake  Age:  74 y.o., Sex:  male  Medical Record #: [id number]  Today's Date: 4/27/2024 04/27/24 1258   Treatment Variance   Reason For Missed Therapy Medical - Patient not Able To Participate;Medical - Patient Is Not Medically Stable   Initial Contact Note    Initial Contact Note  Order Received and Verified, Speech Therapy Evaluation in Progress with Full Report to Follow.   Interdisciplinary Plan of Care Collaboration   IDT Collaboration with  Nursing   Collaboration Comments Order received for a CSE. Per RN, pt not appropriate to participate in evaluation at this time as pt is lethargic and not following commands. SLP to hold and complete evaluation as pt is appropriate.

## 2024-04-27 NOTE — PROGRESS NOTES
Report received from NOC RN. Patient A&Ox1, oriented to person. Patient extremely lethargic, able to follow minimal commands. Blood glucose 152, patient is on oral prednisone. Patient in bilateral wrist restraints per order. ROM performed and skin assessed. All fall precautions in place. Bed is locked in lowest position. Personal belongings and call light within reach. Care ongoing.

## 2024-04-27 NOTE — PROGRESS NOTES
4 Eyes Skin Assessment Completed by ALLAN Thompson and ALLAN Cruz.    Head Bruising  Ears WDL  Nose WDL  Mouth WDL  Neck WDL  Breast/Chest Bruising  Shoulder Blades WDL  Spine Bruising  (R) Arm/Elbow/Hand Bruising  (L) Arm/Elbow/Hand Bruising  Abdomen WDL  Groin WDL  Scrotum/Coccyx/Buttocks WDL  (R) Leg Scab  (L) Leg Scab  (R) Heel/Foot/Toe WDL  (L) Heel/Foot/Toe WDL          Devices In Places Tele Box, Blood Pressure Cuff, Pulse Ox, and Condom Cath      Interventions In Place Pillows    Possible Skin Injury No    Pictures Uploaded Into Epic N/A  Wound Consult Placed N/A  RN Wound Prevention Protocol Ordered No

## 2024-04-27 NOTE — PROGRESS NOTES
Patient transported from ER. Patient A&Ox4. Plan of care discussed with patient. Family at bedside. Patient oriented to floor and surroundings. Patient currently on RA. Patient currently tele monitored. Patient is complaining of pain  0/10. Tele box placed. Monitor room notified. 2 rn skin check performed. Patient educated to call for assistance before ambulating. Patient education regarding fall risk. Call light within reach. Fall precautions in place.

## 2024-04-27 NOTE — ASSESSMENT & PLAN NOTE
From fall  Case was discussed with Dr. Xie neurosurgeon, no intervention needed, brace  Pain control and PT and OT and placement

## 2024-04-27 NOTE — HOSPITAL COURSE
74-year-old male with history of smoking, alcoholism and hypertension who presented 4/26 with fall. Patient states he is drinking beer every day and smoking half a pack. Patient tripped when he was walking and using cane and fell on his left side, denied any dizziness or lightheadedness, denied any loss of consciousness. Patient lives by himself and usually does all daily activities by himself. Patient was noticed to have cachectic appearance and bruises on his body. Vital signs were stable and patient was on room air. Chest x-ray showed signs of COPD however no signs of pneumonia. CT lumbar, thoracic showed acute compression fracture of T10, CT abdomen showed rib fracture on the left side, seventh, eighth and ninth with diffuse colitis and severe fatty liver disease. Labs showed leukocytosis 17 with elevated liver enzymes ALT 3460 and AST 1279 with total bilirubin 2.1 also patient was found to have bicarb 14 with elevated anion gap and potassium 3.5, IV fluid with CIWA protocol was initiated.

## 2024-04-27 NOTE — THERAPY
Physical Therapy Contact Note    Patient Name: Ady Blake  Age:  74 y.o., Sex:  male  Medical Record #: [id number]  Today's Date: 4/27/2024    PT orders received. Spoke with RN, pt not appropriate for PT eval, not following commands. Will re-attempt as pt more appropriate     Juan Alberto Donaldson PT, DPT

## 2024-04-27 NOTE — ASSESSMENT & PLAN NOTE
Patient has delirium, multi factors  Likely related to alcoholism, infection, dehydration and hospitalization  CT head did not show any acute finding  Nonpharmacological treatment  Schedule Seroquel at night  Avoid aggressive sedation and avoid antihistamine  Consider EEG or MRI if there is no improving

## 2024-04-27 NOTE — CARE PLAN
Problem: Knowledge Deficit - Standard  Goal: Patient and family/care givers will demonstrate understanding of plan of care, disease process/condition, diagnostic tests and medications  Description: Target End Date:  1-3 days or as soon as patient condition allows    Document in Patient Education    1.  Patient and family/caregiver oriented to unit, equipment, visitation policy and means for communicating concern  2.  Complete/review Learning Assessment  3.  Assess knowledge level of disease process/condition, treatment plan, diagnostic tests and medications  4.  Explain disease process/condition, treatment plan, diagnostic tests and medications  Outcome: Progressing     Problem: Knowledge Deficit - COPD  Goal: Patient/significant other demonstrates understanding of disease process, utilization of the Action Plan, medications and discharge instruction  Description: Target End Date:  1-3 days or as soon as patient condition allows    Document in Patient Education    1.  Discuss the importance of medical follow-up care, periodic chest x-rays, sputum cultures  2.  Review worsening signs and symptoms of COPD flare and exacerbation and its management  3.  Discuss respiratory medications, side effects, adverse reactions  4.  Demonstrate technique for using a metered-dose inhaler (MDI) and utilization of a spacer  5.  Review the COPD Action Plan  6.  Instruct and reinforce the rationale for breathing exercises, coughing effectively, and general conditioning exercises  7.  Stress importance of oral care and dental hygiene  8.  Discuss the importance of avoiding people with active respiratory infections and need for routine influenza and pneumococcal vaccinations  9.  Discuss factors that may trigger condition and encourage patient/significant other to explore ways to control these factors in and around the home and work setting  10. Review the harmful effects of smoking and advise cessation of smoking  11. Provide  information about activity limitations and alternating activities with rest periods to prevent fatigue  12. Instruct asthmatic patient of use of peak flow meter, as appropriate  13. Review oxygen requirements and dosage, safe use of oxygen, and refer to the supplier as indicated  14. Educate patient/family/caregiver on the use of Nasal Intermittent Positive Pressure Ventilation (NIPPV) and possible adverse reactions  Outcome: Progressing     Problem: Nutrition - Advanced  Goal: Patient will display progressive weight gain toward goal have adequate food and fluid intake  Description: Target End Date:  Prior to discharge or change in level of care    1.  Daily weights  2.  Monitor dietary intake  3.  Promote systemic fluid hydration within cardiac tolerance  4.  Albumin and PreAlbumin per provider order  5.  Enteral and parenteral nutrition per provider order  6.  Calorie count  7.  Provide oral care  8.  Collaborate with Clinical Dietician  9.  Consider Speech Therapy consult for swallowing difficulties  10. Administer supplemental oxygen during meals as indicated  Outcome: Progressing     Problem: Impaired Gas Exchange  Goal: Patient will demonstrate improved ventilation and adequate oxygenation and participate in treatment regimen within the level of ability/situation.  Description: Target End Date:  Prior to discharge or change in level of care    1.   Assess/monitor rate/rhythm/depth of effort of respirations  2.   Assess oxygenation as ordered  3.   Administer/titrate oxygen as ordered  4.   Position patient for maximum ventilatory efficiency  5.   Turn, cough, and deep breathe  6.   Vital signs, pulse oximetry  7.   Assess color and body temperature  8.   Assess and monitor breath sounds  9.   Encourage deep-slow or pursed-lip breathing exercises  10. Monitor changes in the level of consciousness and mental status  11. Encourage expectoration of sputum; airway suctioning  12. Elevate the head of the bed and  position patient for maximum ventilatory efficiency  13. Provide a calm, quiet environment  14. Limit patient's activity during the acute phase and have patient resume activity gradually and increase as individually tolerated  15. Evaluate sleep patterns and limit stimulants such as caffeine  16. Collaborate with RT to administer medications/treatments as ordered  Outcome: Progressing     Problem: Risk for Aspiration  Goal: Patient's risk for aspiration will be absent or decrease  Description: Target End Date:  Prior to discharge or change in level of care    1.   Complete dysphagia screening on admission  2.   NPO until dysphagia screening complete or medically cleared  3.   Collaborate with Speech Therapy, Clinical Dietitian and interdisciplinary team  4.   Implement aspiration precautions  5.   Assist patient up to chair for meals  6.   Elevate head of bed 90 degrees if patient is unable to get out of bed  7.   Encourage small bites  8.   Ensure foods/liquids are of appropriate consistency  9.   Assess for any signs/symptoms of aspiration  10. Assess breath sounds and vital signs after oral intake  Outcome: Progressing     Problem: Optimal Care for Alcohol Withdrawal  Goal: Optimal Care for the alcohol withdrawal patient  Description: Target End Date:  1 to 3 days    1.  Alcohol history screening done on admission  2.  CIWA-AR score assessment (includes assessment of nausea/vomiting, tremor, sweats, anxiety, agitation, tactile, visual and auditory disturbances, headache and orientation/sensorium).  Document on CIWA flowsheet.  3.  Follow CIWA-AR score protocol  4.  Frequent reorientation  5.  Monitor for fluid and electrolyte imbalance.  6.  Assess for respiratory depression due to sedation (pulse ox)  7.  Consider thiamine, multivitamins, folic acid and magnesium sulfate per provider order  8.  Collaborate with Social Workers/Case Management  9.  Collaborate with mental health  Outcome: Progressing     Problem:  Seizure Precautions  Goal: Implementation of seizure precautions  Description: Target End Date:  Prior to discharge or change in level of care    1.  Padded side rails up at all times  2.  Suction equipment and oxygen delivery system at bedside  3.  Continuous pulse oximeter in use  4.  Implement fall precautions, bed alarm on, bed in lowest position  5.  IV access (per order)  6.  Provide low stimulus environment, avoid exposure to triggers  7.  Instruct patient to use call light/seizure button if having warning signs of impending seizure  Outcome: Progressing     Problem: Lifestyle Changes  Goal: Patient's ability to identify lifestyle changes and available resources to help reduce recurrence of condition will improve  Description: Target End Date:  1 to 3 days    1.  Discuss recommended lifestyle changes  2.  Identify available resources and support systems  3.  Consider referral to substance abuse program  Outcome: Progressing     Problem: Psychosocial  Goal: Patient's level of anxiety will decrease  Description: Target End Date:  1-3 days or as soon as patient condition allows    1.  Collaborate with patient and family/caregiver to identify triggers and develop strategies to cope with anxiety  2.  Implement stimuli reduction, calming techniques  3.  Pharmacologic management per provider order  4.  Encourage patient/family/care giver participation  5.  Collaborate with interdisciplinary team including Psychologist or Behavioral Health Team as needed  Outcome: Progressing  Goal: Spiritual and cultural needs incorporated into hospitalization  Description: Target End Date:  End of day 1    1.  Encourage verbalization of feelings, concerns, expectations and needs  2.  Collaborate with Case Management/  3.  Collaborate with Pastoral Care to meet spiritual needs  Outcome: Progressing     Problem: Fall Risk  Goal: Patient will remain free from falls  Description: Target End Date:  Prior to discharge or  change in level of care    Document interventions on the [facility] [place]    1.  Assess for fall risk factors  2.  Implement fall precautions  Outcome: Progressing     Problem: Safety - Medical Restraint  Goal: Remains free of injury from restraints (Restraint for Interference with Medical Device)  Description: INTERVENTIONS:  1. Determine that other, less restrictive measures have been tried or would not be effective before applying the restraint  2. Evaluate the patient's condition at the time of restraint application  3. Educate patient/family regarding the reason for restraint  4. Q2H: Monitor safety, psychosocial status, comfort, circulation, respiratory status, LOC, nutrition and hydration  Outcome: Progressing  Goal: Free from restraint(s) (Restraint for Interference with Medical Device)  Description: INTERVENTIONS:  1.  ONCE/SHIFT or MINIMUM Q12H: Assess and document the continuing need for restraints  2.  Q24H: Continued use of restraint requires LIP to perform face to face examination and written order  3.  Identify and implement measures to help patient regain control  4.  Educate patient/family on discontinuation criteria   5.  Assess patient's understanding and retention of education provided  6.  Assess readiness for release & initiate progressive release per protocol  7.  Identify and document criteria for restraints  Outcome: Progressing   The patient is Stable - Low risk of patient condition declining or worsening    Shift Goals  Clinical Goals: VSS, safety  Patient Goals: to go home  Family Goals: PREETI    Progress made toward(s) clinical / shift goals:  VSS, safety    Patient is not progressing towards the following goals:

## 2024-04-28 ENCOUNTER — APPOINTMENT (OUTPATIENT)
Dept: RADIOLOGY | Facility: MEDICAL CENTER | Age: 74
DRG: 189 | End: 2024-04-28
Attending: INTERNAL MEDICINE
Payer: MEDICARE

## 2024-04-28 LAB
ALBUMIN SERPL BCP-MCNC: 3.2 G/DL (ref 3.2–4.9)
ALBUMIN/GLOB SERPL: 1.4 G/DL
ALP SERPL-CCNC: 67 U/L (ref 30–99)
ALT SERPL-CCNC: 1106 U/L (ref 2–50)
AMMONIA PLAS-SCNC: 23 UMOL/L (ref 11–45)
ANION GAP SERPL CALC-SCNC: 17 MMOL/L (ref 7–16)
ANISOCYTOSIS BLD QL SMEAR: ABNORMAL
AST SERPL-CCNC: 209 U/L (ref 12–45)
BASOPHILS # BLD AUTO: 0 % (ref 0–1.8)
BASOPHILS # BLD: 0 K/UL (ref 0–0.12)
BILIRUB SERPL-MCNC: 1.7 MG/DL (ref 0.1–1.5)
BUN SERPL-MCNC: 28 MG/DL (ref 8–22)
CALCIUM ALBUM COR SERPL-MCNC: 8.6 MG/DL (ref 8.5–10.5)
CALCIUM SERPL-MCNC: 8 MG/DL (ref 8.5–10.5)
CHLORIDE SERPL-SCNC: 105 MMOL/L (ref 96–112)
CO2 SERPL-SCNC: 17 MMOL/L (ref 20–33)
COMMENT NL1176: NORMAL
CREAT SERPL-MCNC: 0.73 MG/DL (ref 0.5–1.4)
CRP SERPL HS-MCNC: 6.12 MG/DL (ref 0–0.75)
EOSINOPHIL # BLD AUTO: 0 K/UL (ref 0–0.51)
EOSINOPHIL NFR BLD: 0 % (ref 0–6.9)
ERYTHROCYTE [DISTWIDTH] IN BLOOD BY AUTOMATED COUNT: 44.5 FL (ref 35.9–50)
GFR SERPLBLD CREATININE-BSD FMLA CKD-EPI: 96 ML/MIN/1.73 M 2
GLOBULIN SER CALC-MCNC: 2.3 G/DL (ref 1.9–3.5)
GLUCOSE SERPL-MCNC: 111 MG/DL (ref 65–99)
HCT VFR BLD AUTO: 32.7 % (ref 42–52)
HGB BLD-MCNC: 11.4 G/DL (ref 14–18)
LYMPHOCYTES # BLD AUTO: 6.31 K/UL (ref 1–4.8)
LYMPHOCYTES NFR BLD: 51.7 % (ref 22–41)
MAGNESIUM SERPL-MCNC: 1.9 MG/DL (ref 1.5–2.5)
MANUAL DIFF BLD: NORMAL
MCH RBC QN AUTO: 32.4 PG (ref 27–33)
MCHC RBC AUTO-ENTMCNC: 34.9 G/DL (ref 32.3–36.5)
MCV RBC AUTO: 92.9 FL (ref 81.4–97.8)
MONOCYTES # BLD AUTO: 0.62 K/UL (ref 0–0.85)
MONOCYTES NFR BLD AUTO: 5.1 % (ref 0–13.4)
MORPHOLOGY BLD-IMP: NORMAL
NEUTROPHILS # BLD AUTO: 5.27 K/UL (ref 1.82–7.42)
NEUTROPHILS NFR BLD: 43.2 % (ref 44–72)
NRBC # BLD AUTO: 0.02 K/UL
NRBC BLD-RTO: 0.2 /100 WBC (ref 0–0.2)
PHOSPHATE SERPL-MCNC: 1.5 MG/DL (ref 2.5–4.5)
PLATELET # BLD AUTO: 161 K/UL (ref 164–446)
PLATELET BLD QL SMEAR: NORMAL
PMV BLD AUTO: 10.4 FL (ref 9–12.9)
POTASSIUM SERPL-SCNC: 2.9 MMOL/L (ref 3.6–5.5)
PROCALCITONIN SERPL-MCNC: 7.39 NG/ML
PROT SERPL-MCNC: 5.5 G/DL (ref 6–8.2)
RBC # BLD AUTO: 3.52 M/UL (ref 4.7–6.1)
RBC BLD AUTO: PRESENT
SMUDGE CELLS BLD QL SMEAR: NORMAL
SODIUM SERPL-SCNC: 139 MMOL/L (ref 135–145)
WBC # BLD AUTO: 12.2 K/UL (ref 4.8–10.8)

## 2024-04-28 PROCEDURE — 92612 ENDOSCOPY SWALLOW (FEES) VID: CPT

## 2024-04-28 PROCEDURE — 94640 AIRWAY INHALATION TREATMENT: CPT

## 2024-04-28 PROCEDURE — 82140 ASSAY OF AMMONIA: CPT

## 2024-04-28 PROCEDURE — 85027 COMPLETE CBC AUTOMATED: CPT

## 2024-04-28 PROCEDURE — 80053 COMPREHEN METABOLIC PANEL: CPT

## 2024-04-28 PROCEDURE — 85007 BL SMEAR W/DIFF WBC COUNT: CPT

## 2024-04-28 PROCEDURE — 700105 HCHG RX REV CODE 258: Performed by: INTERNAL MEDICINE

## 2024-04-28 PROCEDURE — 302146: Performed by: HOSPITALIST

## 2024-04-28 PROCEDURE — 86140 C-REACTIVE PROTEIN: CPT

## 2024-04-28 PROCEDURE — A9270 NON-COVERED ITEM OR SERVICE: HCPCS | Performed by: INTERNAL MEDICINE

## 2024-04-28 PROCEDURE — 83735 ASSAY OF MAGNESIUM: CPT

## 2024-04-28 PROCEDURE — 99233 SBSQ HOSP IP/OBS HIGH 50: CPT | Performed by: INTERNAL MEDICINE

## 2024-04-28 PROCEDURE — 700111 HCHG RX REV CODE 636 W/ 250 OVERRIDE (IP): Mod: JZ | Performed by: STUDENT IN AN ORGANIZED HEALTH CARE EDUCATION/TRAINING PROGRAM

## 2024-04-28 PROCEDURE — 700101 HCHG RX REV CODE 250: Performed by: INTERNAL MEDICINE

## 2024-04-28 PROCEDURE — 36415 COLL VENOUS BLD VENIPUNCTURE: CPT

## 2024-04-28 PROCEDURE — 770020 HCHG ROOM/CARE - TELE (206)

## 2024-04-28 PROCEDURE — 94669 MECHANICAL CHEST WALL OSCILL: CPT

## 2024-04-28 PROCEDURE — 84145 PROCALCITONIN (PCT): CPT

## 2024-04-28 PROCEDURE — 700111 HCHG RX REV CODE 636 W/ 250 OVERRIDE (IP): Performed by: INTERNAL MEDICINE

## 2024-04-28 PROCEDURE — 700105 HCHG RX REV CODE 258: Performed by: STUDENT IN AN ORGANIZED HEALTH CARE EDUCATION/TRAINING PROGRAM

## 2024-04-28 PROCEDURE — 700102 HCHG RX REV CODE 250 W/ 637 OVERRIDE(OP): Performed by: INTERNAL MEDICINE

## 2024-04-28 PROCEDURE — 92610 EVALUATE SWALLOWING FUNCTION: CPT

## 2024-04-28 PROCEDURE — 84100 ASSAY OF PHOSPHORUS: CPT

## 2024-04-28 RX ADMIN — THIAMINE HYDROCHLORIDE 250 MG: 100 INJECTION, SOLUTION INTRAMUSCULAR; INTRAVENOUS at 17:22

## 2024-04-28 RX ADMIN — MOMETASONE FUROATE AND FORMOTEROL FUMARATE DIHYDRATE 2 PUFF: 100; 5 AEROSOL RESPIRATORY (INHALATION) at 17:18

## 2024-04-28 RX ADMIN — IPRATROPIUM BROMIDE AND ALBUTEROL SULFATE 3 ML: 2.5; .5 SOLUTION RESPIRATORY (INHALATION) at 11:27

## 2024-04-28 RX ADMIN — LIDOCAINE 1 PATCH: 4 PATCH TOPICAL at 13:48

## 2024-04-28 RX ADMIN — ENOXAPARIN SODIUM 40 MG: 100 INJECTION SUBCUTANEOUS at 17:17

## 2024-04-28 RX ADMIN — PREDNISONE 40 MG: 20 TABLET ORAL at 05:07

## 2024-04-28 RX ADMIN — PIPERACILLIN SODIUM AND TAZOBACTAM SODIUM 3.38 G: 3; .375 INJECTION, POWDER, LYOPHILIZED, FOR SOLUTION INTRAVENOUS at 05:15

## 2024-04-28 RX ADMIN — TIOTROPIUM BROMIDE INHALATION SPRAY 5 MCG: 3.12 SPRAY, METERED RESPIRATORY (INHALATION) at 06:59

## 2024-04-28 RX ADMIN — THIAMINE HYDROCHLORIDE 250 MG: 100 INJECTION, SOLUTION INTRAMUSCULAR; INTRAVENOUS at 05:13

## 2024-04-28 RX ADMIN — SODIUM CHLORIDE: 9 INJECTION, SOLUTION INTRAVENOUS at 20:00

## 2024-04-28 RX ADMIN — THIAMINE HYDROCHLORIDE 250 MG: 100 INJECTION, SOLUTION INTRAMUSCULAR; INTRAVENOUS at 13:59

## 2024-04-28 RX ADMIN — MOMETASONE FUROATE AND FORMOTEROL FUMARATE DIHYDRATE 2 PUFF: 100; 5 AEROSOL RESPIRATORY (INHALATION) at 06:59

## 2024-04-28 RX ADMIN — FOLIC ACID 1 MG: 1 TABLET ORAL at 05:08

## 2024-04-28 RX ADMIN — THERA TABS 1 TABLET: TAB at 05:08

## 2024-04-28 RX ADMIN — POTASSIUM PHOSPHATE, MONOBASIC AND POTASSIUM PHOSPHATE, DIBASIC 30 MMOL: 224; 236 INJECTION, SOLUTION, CONCENTRATE INTRAVENOUS at 08:12

## 2024-04-28 RX ADMIN — IPRATROPIUM BROMIDE AND ALBUTEROL SULFATE 3 ML: 2.5; .5 SOLUTION RESPIRATORY (INHALATION) at 06:59

## 2024-04-28 RX ADMIN — PIPERACILLIN SODIUM AND TAZOBACTAM SODIUM 3.38 G: 3; .375 INJECTION, POWDER, LYOPHILIZED, FOR SOLUTION INTRAVENOUS at 13:51

## 2024-04-28 RX ADMIN — PIPERACILLIN SODIUM AND TAZOBACTAM SODIUM 3.38 G: 3; .375 INJECTION, POWDER, LYOPHILIZED, FOR SOLUTION INTRAVENOUS at 20:19

## 2024-04-28 RX ADMIN — IPRATROPIUM BROMIDE AND ALBUTEROL SULFATE 3 ML: 2.5; .5 SOLUTION RESPIRATORY (INHALATION) at 02:33

## 2024-04-28 ASSESSMENT — LIFESTYLE VARIABLES
TOTAL SCORE: 0
ORIENTATION AND CLOUDING OF SENSORIUM: ORIENTED AND CAN DO SERIAL ADDITIONS
AUDITORY DISTURBANCES: NOT PRESENT
TREMOR: NO TREMOR
PAROXYSMAL SWEATS: NO SWEAT VISIBLE
HEADACHE, FULLNESS IN HEAD: NOT PRESENT
NAUSEA AND VOMITING: NO NAUSEA AND NO VOMITING
ANXIETY: NO ANXIETY (AT EASE)
VISUAL DISTURBANCES: NOT PRESENT
AGITATION: NORMAL ACTIVITY

## 2024-04-28 ASSESSMENT — PATIENT HEALTH QUESTIONNAIRE - PHQ9
2. FEELING DOWN, DEPRESSED, IRRITABLE, OR HOPELESS: NOT AT ALL
SUM OF ALL RESPONSES TO PHQ9 QUESTIONS 1 AND 2: 0
1. LITTLE INTEREST OR PLEASURE IN DOING THINGS: NOT AT ALL
1. LITTLE INTEREST OR PLEASURE IN DOING THINGS: NOT AT ALL
SUM OF ALL RESPONSES TO PHQ9 QUESTIONS 1 AND 2: 0
2. FEELING DOWN, DEPRESSED, IRRITABLE, OR HOPELESS: NOT AT ALL

## 2024-04-28 ASSESSMENT — FIBROSIS 4 INDEX: FIB4 SCORE: 4.93

## 2024-04-28 ASSESSMENT — PAIN DESCRIPTION - PAIN TYPE: TYPE: ACUTE PAIN

## 2024-04-28 NOTE — PROGRESS NOTES
Report received from RN, pt care assumed, tele box on. VSS, pt assessment complete. Pt AAOx2, no signs of distress noted at this time. POC discussed with pt and verbalizes no questions. Pt denies any additional needs at this time. Bed in lowest position, bed alarm on, pt educated on fall risk and verbalized understanding, call light within reach, hourly rounding initiated.

## 2024-04-28 NOTE — THERAPY
"Speech Language Pathology   Flexible Endoscopic Evaluation of Swallowing (FEES)        Patient Name: Ady Blake  AGE:  74 y.o., SEX:  male  Medical Record #: [id number]  Date of Service: 4/28/2024      History of Present Illness  Ady Blake is a 74 y.o. male admitted 4/26/2024 with fall     PMHx: ACP (advance care planning) (4/26/2024), Arthritis, Back pain, Bronchitis (8/29/2022), Chronic airway obstruction, not elsewhere classified, Dental disorder, Fall (4/26/2024), Glaucoma, Hiatus hernia syndrome, HTN (hypertension) (6/14/2015), Myocardial infarct (HCC), and Unstable angina (HCC) (6/14/2015).     CT of head 4/27/24: There is no definite acute intracranial abnormality.     CXR 4/27/24:   -Old bilateral rib fractures.  -No airspace infiltrate or pleural effusion. Cardiac silhouette is normal.       Pertinent Information  Current Method of Nutrition: NPO until cleared by speech pathology  Patient Behaviors: Confused, Flat Affect, Agitated   Dentition: Edentulous, Full dentures (donned for FEES)   Feeding Tube: None   Tracheostomy: No               Factor(s) Affecting Performance: Impaired mental status     Discussed the risks, benefits, and alternatives of the FEES procedure. Patient/family acknowledged and agreed to proceed.      Assessment  Flexible Endoscopic Evaluation of Swallowing (FEES) completed at bedside today. The endoscope was passed transnasally via Right nare to evaluate the anatomy and physiology of swallowing. Pt tolerated the procedure with no apparent distress initially but became increasingly agitated and requesting to \"quit\" and \"get that out of my nose.\"    Anatomic Findings: WFL - dry secretions in vallecula, PPW, and lateral channels which cleared following PO   Vocal Fold Motion: Bilateral movement  Secretion Management: Adequate  PO Trials: Ice Chips, Thin Liquid, Pudding, Mixed      Consistency PAS Score Timing Residue Comments   Thin Liquid 1 N/A Vallecular Residue: " None (0%)  Pyriform Sinus Residue: None (0%) PAS 1: tsp x1, cup x2, straw -single  x4. Pt did not follow directives for consecutive sips from the straw.    Pudding 1 N/A Vallecular Residue: Mild (5%-25%)  Pyriform Sinus Residue: None (0%)    Mixed 1 N/A Vallecular Residue: None (0%)  Pyriform Sinus Residue: None (0%)      Penetration-Aspiration Scale (PAS)  1     No contrast enters airway  2     Contrast enters the airway, remains above the vocal folds, and is ejected from the airway (not seen in the airway at the end of the swallow).  3     Contrast enters the airway, remains above the vocal folds, and is not ejected from the airway (is seen in the airway after the swallow).  4     Contrast enters the airway, contacts the vocal folds, and is ejected from the airway.  5     Contrast enters the airway, contacts the vocal folds, and is not ejected from the airway  6     Contrast enters the airway, crosses the plane of the vocal folds, and is ejected from the airway.  7     Contrast enters the airway, crosses the plane of the vocal folds, and is not ejected from the airway despite effort.  8     Contrast enters the airway, crosses the plane of the vocal folds, is not ejected from the airway and there is no response to aspiration.      Oral phase: Somewhat prolonged but functional mastication of mixed solids. Did not test regular solids d/t pt requesting to terminate study.       Pharyngeal phase:  Swallow safest intact - no laryngeal penetration or aspiration visualized across all trials tested. Good visualization of trachea which remained void of any blue residue.   Swallow efficiency intact - no significant stasis of bolus in pharynx.      Compensatory Strategies: Trace-mild amount of vallecular residue following trial of pudding was eliminated with spontaneous cleansing swallow.       Severity Rating:  Severity Rating: RUDOLPH  RUDOLPH: Functional      Clinical Impressions  The pt presents with an age-appropriate  "pharyngeal swallow and mild oral dysphagia suspect acute r/t AMS. Swallow safety and swallow efficiency are preserved. Pt appears to be at low risk for aspiration PNA and low for malnutrition/dehydration. Diet modification is indicated. Swallow prognosis is good given normal swallow for pt's stated age; however, encephalopathy may be a barrier towards diet advancement. SLP following to ensure diet tolerance and progress diet as mentation improves (pt previously on RG during last SLP consult).     OF NOTE: Patient had audible upper airway congestion and congested coughing throughout study that WAS NOT related to PO. Aspiration WAS NOT visualized during the study.      Recommendations  Diet Consistency: Soft & bite size and Thin Liquid diet  Medication: Whole with liquid, As tolerated  Supervision: 1:1 feeding with constant supervision  Positioning: Fully upright and midline during oral intake  Strategies: Small bites/sips, Slow rate of intake  Oral Care: Q4h  Additional Instrumentation: Repeat diagnostic study when clinically appropriate         SLP Treatment Plan  Treatment Plan: Dysphagia Treatment, Patient/Family/Caregiver Training  SLP Frequency: 3x Per Week  Estimated Duration: Until Therapy Goals Met      Anticipated Discharge Needs  Discharge Recommendations: Anticipate that the patient will have no further speech therapy needs after discharge from the hospital   Therapy Recommendations Upon DC: Dysphagia Training, Patient / Family / Caregiver Education       Patient / Family Goals  Patient / Family Goal #1: \"fine\" (to doing FEES)  Goal #1 Outcome: Goal met  Short Term Goal # 1: Patient will particiapte in FEES to further assess oropharyngeal function and determine safest means for nutrition.  Goal Outcome # 1: Goal met, new goal added  Short Term Goal # 1 B : Patient will consume a SB/TN diet with no overt s/sx of aspiration.      Brisa Tariq, SLP  "

## 2024-04-28 NOTE — THERAPY
Speech Language Pathology   Clinical Swallow Evaluation     Patient Name: Ady Blake  AGE:  74 y.o., SEX:  male  Medical Record #: [id number]  Date of Service: 2024      History of Present Illness  Ady Blake is a 74 y.o. male admitted 2024 with fall    PMHx: ACP (advance care planning) (2024), Arthritis, Back pain, Bronchitis (2022), Chronic airway obstruction, not elsewhere classified, Dental disorder, Fall (2024), Glaucoma, Hiatus hernia syndrome, HTN (hypertension) (2015), Myocardial infarct (HCC), and Unstable angina (HCC) (2015).    CT of head 24: There is no definite acute intracranial abnormality.    CXR 24:   -Old bilateral rib fractures.  -No airspace infiltrate or pleural effusion. Cardiac silhouette is normal.      General Information:  Vitals  O2 Delivery Device: None - Room Air  Level of Consciousness: Awake  Patient Behaviors: Confused, Flat Affect  Orientation: Self, , General place, Current year  Follows Directives: Yes - simple commands only      Prior Living Situation & Level of Function:  Lives with - Patient's Self Care Capacity: Unable To Determine At This Time  Communication: Unknown  Swallowing: Unknown. Seen by SLP on 22: recs for RG/TN diet concerns for esophageal dysphagia (coughing, wet/gurgly vocal quality, and regurgitation.)       Oral Mechanism Evaluation:  Dentition: Edentulous, Full dentures   Facial Symmetry: Equal  Facial Sensation: Equal     Labial Observations: WFL   Lingual Observations: Midline  Motor Speech: Mild dysarthria -suspect from edentulism and AMS            Laryngeal Function:  Secretion Management: Adequate  Voice Quality: WFL  Cough: Perceptually weak         Subjective  Patient received awake, with bilateral wrist restraints and oriented x2-3. Patient was a non-historian regarding PLOF and unable to state if he's had difficulty swallowing in the past.      Assessment  Current Method of  "Nutrition: NPO until cleared by speech pathology  Positioning: Carias's (60-90 degrees)  Bolus Administration: SLP    O2 Delivery Device: None - Room Air  Factor(s) Affecting Performance: Impaired mental status  Tracheostomy : No       Swallowing Trials:  Swallowing Trials  Ice: Impaired  Thin Liquid (TN0): Impaired  Pureed (PU4): Impaired    Comments: Adequate bolus acceptance and containment. Patient required 1-2 swallows to clear single bolus concerning for either piecemeal deglutition or pharyngeal residue. Patient had increase in coarse, audible breath sounds following all trials tested and immediate coughing x1 and choking response x1 following trials of thin liquids concerning for airway invasion. PO trials were limited 2/2 concerns for aspiration. Education provided to pt regarding current status and SLP recs - pt agreed to FEES and tolerated gloved finger in nare.       Clinical Impressions  Patient is presenting with clinical signs concerning for oropharyngeal dysphagia and aspiration in the setting of ARF, debility, and encephalopathy. A diagnostic swallow study is indicated to further assess oropharyngeal function and determine safest means for nutrition. FEES tentatively scheduled at 1300.       Recommendations  Diet Consistency: NPO pending FEES  Instrumentation: FEES  Medication: Non Oral  Oral Care: Q4h         SLP Treatment Plan  Treatment Plan: Dysphagia Treatment, Patient/Family/Caregiver Training  SLP Frequency: 3x Per Week  Estimated Duration: Until Therapy Goals Met      Anticipated Discharge Needs  Discharge Recommendations: Recommend post-acute placement for additional speech therapy services prior to discharge home   Therapy Recommendations Upon DC: Dysphagia Training, Patient / Family / Caregiver Education        Patient / Family Goals  Patient / Family Goal #1: \"fine\" (to doing FEES)  Short Term Goals  Short Term Goal # 1: Patient will particiapte in FEES to further assess oropharyngeal " function and determine safest means for nutrition.      Brisa Tariq, SLP

## 2024-04-28 NOTE — CARE PLAN
The patient is Stable - Low risk of patient condition declining or worsening    Shift Goals  Clinical Goals: restraint, safety anf comfort  Patient Goals: rest  Family Goals: not present    Progress made toward(s) clinical / shift goals:    Problem: Knowledge Deficit - Standard  Goal: Patient and family/care givers will demonstrate understanding of plan of care, disease process/condition, diagnostic tests and medications  Outcome: Progressing     Problem: Risk for Aspiration  Goal: Patient's risk for aspiration will be absent or decrease  Outcome: Progressing     Problem: Lifestyle Changes  Goal: Patient's ability to identify lifestyle changes and available resources to help reduce recurrence of condition will improve  Outcome: Progressing     Problem: Psychosocial  Goal: Patient's level of anxiety will decrease  Outcome: Progressing       Patient is not progressing towards the following goals:

## 2024-04-28 NOTE — CARE PLAN
The patient is Stable - Low risk of patient condition declining or worsening    Shift Goals  Clinical Goals: Safety, IV abx, speech eval  Patient Goals: rest, comfort  Family Goals: PREETI    Progress made toward(s) clinical / shift goals:      Problem: Risk for Aspiration  Goal: Patient's risk for aspiration will be absent or decrease  Outcome: Progressing     Problem: Optimal Care for Alcohol Withdrawal  Goal: Optimal Care for the alcohol withdrawal patient  Outcome: Progressing     Problem: Safety - Medical Restraint  Goal: Remains free of injury from restraints (Restraint for Interference with Medical Device)  Outcome: Progressing       Patient is not progressing towards the following goals:

## 2024-04-28 NOTE — PROGRESS NOTES
[facility] Medicine Daily Progress Note    Date of Service  4/28/2024    Chief Complaint  Ady Blake is a 74 y.o. male admitted 4/26/2024 with fall    Hospital Course:    74-year-old male with history of smoking, alcoholism and hypertension who presented 4/26 with fall. Patient states he is drinking beer every day and smoking half a pack. Patient tripped when he was walking and using cane and fell on his left side, denied any dizziness or lightheadedness, denied any loss of consciousness. Patient lives by himself and usually does all daily activities by himself. Patient was noticed to have cachectic appearance and bruises on his body. Vital signs were stable and patient was on room air. Chest x-ray showed signs of COPD however no signs of pneumonia. CT lumbar, thoracic showed acute compression fracture of T10, CT abdomen showed rib fracture on the left side, seventh, eighth and ninth with diffuse colitis and severe fatty liver disease. Labs showed leukocytosis 17 with elevated liver enzymes ALT 3460 and AST 1279 with total bilirubin 2.1 also patient was found to have bicarb 14 with elevated anion gap and potassium 3.5, IV fluid with CIWA protocol was initiated.  CT head was done and did not show an acute finding.  Patient was very lethargic, CIWA protocol was discontinued to avoid sedation.      Interval Problem Update  -Evaluated examined the patient at bedside, patient is more alert however still confused, patient has weak coughing, patient has some wheezing, continue inhalers  -Continue fluid gently, replace potassium IV and phosphorus  -CT head did not show any acute finding  -Some worsening on his leukocytosis, de-escalate antibiotics to Unasyn.  -Improving on his liver enzymes  -Patient might have some dysarthria, will order MRI to rule out stroke.  -Patient has complicated medical problems and high suspicious for deterioration, consider ICU transfer if needed.        I have discussed this patient's  plan of care and discharge plan at IDT rounds today with Case Management, Nursing, Nursing leadership, and other members of the IDT team.    Consultants/Specialty  None   Code Status  Full Code    Disposition  The patient is not medically cleared for discharge to home or a post-acute facility.  Anticipate discharge to: skilled nursing facility    I have placed the appropriate orders for post-discharge needs.    Review of Systems  Review of Systems   Unable to perform ROS: Mental status change        Physical Exam  Temp:  [36.2 °C (97.2 °F)-36.4 °C (97.5 °F)] 36.4 °C (97.5 °F)  Pulse:  [81-98] 92  Resp:  [14-29] 16  BP: (109-162)/(49-89) 147/80  SpO2:  [87 %-96 %] 94 %    Physical Exam  Constitutional:       General: He is in acute distress.      Appearance: He is ill-appearing.      Comments: Cachectic appearance   Eyes:      General: No scleral icterus.  Cardiovascular:      Rate and Rhythm: Normal rate.      Heart sounds: No murmur heard.  Pulmonary:      Effort: No respiratory distress.      Breath sounds: No wheezing.   Abdominal:      General: There is no distension.      Tenderness: There is no abdominal tenderness. There is no right CVA tenderness, left CVA tenderness or guarding.   Musculoskeletal:      Right lower leg: No edema.      Left lower leg: No edema.   Lymphadenopathy:      Cervical: No cervical adenopathy.   Skin:     Coloration: Skin is not jaundiced.      Findings: Bruising and lesion present. No rash.   Neurological:      General: No focal deficit present.      Mental Status: He is alert. Mental status is at baseline. He is disoriented.      Cranial Nerves: No cranial nerve deficit.      Motor: No weakness.      Gait: Gait normal.         Fluids    Intake/Output Summary (Last 24 hours) at 4/28/2024 1231  Last data filed at 4/28/2024 0800  Gross per 24 hour   Intake --   Output 800 ml   Net -800 ml       Laboratory  Recent Labs     04/26/24  1142 04/27/24  0802 04/28/24  0237   WBC 17.4* 10.2  12.2*   RBC 4.55* 3.54* 3.52*   HEMOGLOBIN 15.2 11.8* 11.4*   HEMATOCRIT 42.2 32.8* 32.7*   MCV 92.7 92.7 92.9   MCH 33.4* 33.3* 32.4   MCHC 36.0 36.0 34.9   RDW 45.5 45.2 44.5   PLATELETCT 193 139* 161*   MPV 11.0 10.9 10.4     Recent Labs     04/27/24  0401 04/27/24  1421 04/28/24  0237   SODIUM 134* 134* 139   POTASSIUM 2.9* 3.9 2.9*   CHLORIDE 98 99 105   CO2 17* 20 17*   GLUCOSE 110* 146* 111*   BUN 64* 49* 28*   CREATININE 1.13 0.91 0.73   CALCIUM 8.2* 8.1* 8.0*     Recent Labs     04/27/24  0401   INR 1.18*               Imaging  CT-HEAD W/O   Final Result      There is no definite acute intracranial abnormality.         EC-ECHOCARDIOGRAM COMPLETE W/O CONT   Final Result      DX-CHEST-LIMITED (1 VIEW)   Final Result      No acute process.      US-RUQ   Final Result         1.  Dilatation the right renal pelvis, appearance most compatible with mild hydronephrosis.   2.  Echogenic liver, compatible with fatty change versus fibrosis.   3.  Atherosclerosis      CT-LSPINE W/O PLUS RECONS   Final Result      Mild multilevel degenerative disease without definite fracture.      CT-TSPINE W/O PLUS RECONS   Final Result      1.  Acute superior endplate compression fracture of the T10 vertebral body with moderate height loss.      CT-CHEST,ABDOMEN,PELVIS WITH   Final Result      1.  Acute left lateral seventh, eighth, ninth rib fractures.   2.  Moderate superior endplate compression fracture of the T10 vertebral body which appears acute with fracture lines visible.   3.  Ileum enteritis.   4.  Diffuse colitis.   5.  Severe fatty liver infiltration.      DX-CHEST-PORTABLE (1 VIEW)   Final Result      No acute cardiac or pulmonary abnormalities are identified.      HC-WQNUSXA-P/O    (Results Pending)   MR-BRAIN-W/O    (Results Pending)        Assessment/Plan  * Acute respiratory failure with hypoxia (HCC)- (present on admission)  Assessment & Plan  Patient is on room air  Likely related to COPD  Inhalers    Metabolic  encephalopathy  Assessment & Plan  Patient has delirium, multi factors  Likely related to alcoholism, infection, dehydration and hospitalization  CT head did not show any acute finding  Nonpharmacological treatment  Avoid aggressive sedation and avoid antihistamine    Rib fracture  Assessment & Plan  Due to falls  No pneumothorax  Pain control  Respiratory hygiene    Elevated liver enzymes  Assessment & Plan  Severely elevated enzymes around thousands   MRCP is pending  Labs daily  Close monitoring and consider steroid if needed  Ultrasound did not show any acute finding except fatty liver  Improving likely related to alcoholic hepatitis    Fall  Assessment & Plan  Patient has cachectic appearance, multiple bruises on his body  Patient using cane at home, lives by himself  PT and OT  TSH is normal and vitamin D is pending    Debility  Assessment & Plan  Patient has significant generalized weakness with debility and recurrent falls  PT and OT evaluation  Check vitamin D and TSH    Closed fracture of tenth thoracic vertebra (HCC)  Assessment & Plan  From fall  Case was discussed with Dr. Xie neurosurgeon, no intervention needed, brace  Pain control and PT and OT    Leukocytosis  Assessment & Plan  Likely related to stress  No signs of infection or fever, holding on antibiotics at this time    Metabolic acidosis  Assessment & Plan  Likely related to alcoholic, starvation and fall with kidney failure  IV fluids gently, improving on his labs  Infection workup possible aspiration pneumonia:     AVA (acute kidney injury) (HCC)  Assessment & Plan  With creatinine 1.6  Likely related to dehydration  IV fluid and labs daily bladder scan to rule out retention  CPK is normal    Malnutrition (HCC)  Assessment & Plan  Cachectic appearance  Nutrition consult  Encourage the patient to quit drinking alcohol and smoking    Tobacco dependence- (present on admission)  Assessment & Plan  Smoking cessation  Nicotine patch    COPD  (chronic obstructive pulmonary disease) (HCC)- (present on admission)  Assessment & Plan  No significant exacerbation  Chest x-ray did not show any signs of infiltrate or or pneumonia  Inhalers, no need for prednisone  Pulmonary referral    Alcohol abuse- (present on admission)  Assessment & Plan  Heavy drinking for years  [facility] protocol  High-dose of thiamine and multivitamins  Encouraged the patient to quit    HTN (hypertension)- (present on admission)  Assessment & Plan  Holding blood pressure   Avoid aggressive treatment    ACP (advance care planning)  Assessment & Plan  4/26, patient is alert and oriented x 4, discussed the plan of care with the patient, answered all his questions, discussed the CODE STATUS with the patient, patient wants to be full code.  Time 15 minutes         VTE prophylaxis:   SCDs/TEDs   enoxaparin ppx      I have performed a physical exam and reviewed and updated ROS and Plan today (4/28/2024). In review of yesterday's note (4/27/2024), there are no changes except as documented above.    Greater than 51 minutes spent prepping to see patient (e.g. review of tests) obtaining and/or reviewing separately obtained history. Performing a medically appropriate examination and/ evaluation.  Counseling and educating the patient/family/caregiver.  Ordering medications, tests, or procedures.  Referring and communicating with other health care professionals.  Documenting clinical information in EPIC.  Independently interpreting results and communicating results to patient/family/caregiver.  Care coordination

## 2024-04-28 NOTE — PROGRESS NOTES
Telemetry Shift Summary    Rhythm SR  HR Range 81-91  Ectopy COUP  Measurements .15/.10/.36          Normal Values  Rhythm SR  HR Range    Measurements 0.12-0.20 / 0.06-0.10  / 0.30-0.52

## 2024-04-28 NOTE — CARE PLAN
The patient is Stable - Low risk of patient condition declining or worsening    Shift Goals  Clinical Goals: Hemodynamic stability, IV hydration, SLP eval  Patient Goals: home  Family Goals: not present    Progress made toward(s) clinical / shift goals:      Problem: Knowledge Deficit - Standard  Goal: Patient and family/care givers will demonstrate understanding of plan of care, disease process/condition, diagnostic tests and medications  Outcome: Progressing     Problem: Optimal Care for Alcohol Withdrawal  Goal: Optimal Care for the alcohol withdrawal patient  Outcome: Progressing     Problem: Safety - Medical Restraint  Goal: Remains free of injury from restraints (Restraint for Interference with Medical Device)  Outcome: Progressing  Flowsheets (Taken 4/27/2024 1816)  Addressed this shift: Remains free of injury from restraints (restraint for interference with medical device):   Determine that other, less restrictive measures have been tried or would not be effective before applying the restraint   Evaluate the patient's condition at the time of restraint application   Inform patient/family regarding the reason for restraint   Every 2 hours: Monitor safety, psychosocial status, comfort, nutrition and hydration  Goal: Free from restraint(s) (Restraint for Interference with Medical Device)  Outcome: Progressing  Flowsheets (Taken 4/27/2024 1816)  Addressed this shift: Free from restraint(s) (restraint for interference with medical device):   ONCE/SHIFT or MINIMUM Every 12 hours: Assess and document the continuing need for restraints   Every 24 hours: Continued use of restraint requires Licensed Independent Practitioner to perform face to face examination and written order   Identify and implement measures to help patient regain control       Patient is not progressing towards the following goals:

## 2024-04-29 ENCOUNTER — APPOINTMENT (OUTPATIENT)
Dept: RADIOLOGY | Facility: MEDICAL CENTER | Age: 74
DRG: 189 | End: 2024-04-29
Attending: INTERNAL MEDICINE
Payer: MEDICARE

## 2024-04-29 LAB
ALBUMIN SERPL BCP-MCNC: 3.2 G/DL (ref 3.2–4.9)
ALBUMIN/GLOB SERPL: 1.3 G/DL
ALP SERPL-CCNC: 72 U/L (ref 30–99)
ALT SERPL-CCNC: 846 U/L (ref 2–50)
ANION GAP SERPL CALC-SCNC: 14 MMOL/L (ref 7–16)
ANION GAP SERPL CALC-SCNC: 15 MMOL/L (ref 7–16)
ANISOCYTOSIS BLD QL SMEAR: ABNORMAL
AST SERPL-CCNC: 137 U/L (ref 12–45)
BASOPHILS # BLD AUTO: 0 % (ref 0–1.8)
BASOPHILS # BLD: 0 K/UL (ref 0–0.12)
BILIRUB SERPL-MCNC: 1.7 MG/DL (ref 0.1–1.5)
BUN SERPL-MCNC: 11 MG/DL (ref 8–22)
BUN SERPL-MCNC: 7 MG/DL (ref 8–22)
CALCIUM ALBUM COR SERPL-MCNC: 8.6 MG/DL (ref 8.5–10.5)
CALCIUM SERPL-MCNC: 7.4 MG/DL (ref 8.5–10.5)
CALCIUM SERPL-MCNC: 8 MG/DL (ref 8.5–10.5)
CHLORIDE SERPL-SCNC: 103 MMOL/L (ref 96–112)
CHLORIDE SERPL-SCNC: 106 MMOL/L (ref 96–112)
CO2 SERPL-SCNC: 19 MMOL/L (ref 20–33)
CO2 SERPL-SCNC: 19 MMOL/L (ref 20–33)
COMMENT NL1176: NORMAL
CREAT SERPL-MCNC: 0.56 MG/DL (ref 0.5–1.4)
CREAT SERPL-MCNC: 0.69 MG/DL (ref 0.5–1.4)
EOSINOPHIL # BLD AUTO: 0 K/UL (ref 0–0.51)
EOSINOPHIL NFR BLD: 0 % (ref 0–6.9)
ERYTHROCYTE [DISTWIDTH] IN BLOOD BY AUTOMATED COUNT: 47.3 FL (ref 35.9–50)
GFR SERPLBLD CREATININE-BSD FMLA CKD-EPI: 104 ML/MIN/1.73 M 2
GFR SERPLBLD CREATININE-BSD FMLA CKD-EPI: 98 ML/MIN/1.73 M 2
GLOBULIN SER CALC-MCNC: 2.4 G/DL (ref 1.9–3.5)
GLUCOSE SERPL-MCNC: 94 MG/DL (ref 65–99)
GLUCOSE SERPL-MCNC: 99 MG/DL (ref 65–99)
HCT VFR BLD AUTO: 34.1 % (ref 42–52)
HGB BLD-MCNC: 11.9 G/DL (ref 14–18)
LYMPHOCYTES # BLD AUTO: 7.5 K/UL (ref 1–4.8)
LYMPHOCYTES NFR BLD: 44.4 % (ref 22–41)
MACROCYTES BLD QL SMEAR: ABNORMAL
MAGNESIUM SERPL-MCNC: 1.4 MG/DL (ref 1.5–2.5)
MAGNESIUM SERPL-MCNC: 2.3 MG/DL (ref 1.5–2.5)
MANUAL DIFF BLD: NORMAL
MCH RBC QN AUTO: 33.2 PG (ref 27–33)
MCHC RBC AUTO-ENTMCNC: 34.9 G/DL (ref 32.3–36.5)
MCV RBC AUTO: 95.3 FL (ref 81.4–97.8)
METAMYELOCYTES NFR BLD MANUAL: 0.9 %
MONOCYTES # BLD AUTO: 0.86 K/UL (ref 0–0.85)
MONOCYTES NFR BLD AUTO: 5.1 % (ref 0–13.4)
MORPHOLOGY BLD-IMP: NORMAL
NEUTROPHILS # BLD AUTO: 8.38 K/UL (ref 1.82–7.42)
NEUTROPHILS NFR BLD: 49.6 % (ref 44–72)
NRBC # BLD AUTO: 0 K/UL
NRBC BLD-RTO: 0 /100 WBC (ref 0–0.2)
NUCLEAR IGG SER QL IA: NORMAL
PHOSPHATE SERPL-MCNC: 1.7 MG/DL (ref 2.5–4.5)
PHOSPHATE SERPL-MCNC: 2.4 MG/DL (ref 2.5–4.5)
PLATELET # BLD AUTO: 175 K/UL (ref 164–446)
PLATELET BLD QL SMEAR: NORMAL
PMV BLD AUTO: 10.1 FL (ref 9–12.9)
POTASSIUM SERPL-SCNC: 3.2 MMOL/L (ref 3.6–5.5)
POTASSIUM SERPL-SCNC: 3.7 MMOL/L (ref 3.6–5.5)
PROCALCITONIN SERPL-MCNC: 2.84 NG/ML
PROT SERPL-MCNC: 5.6 G/DL (ref 6–8.2)
RBC # BLD AUTO: 3.58 M/UL (ref 4.7–6.1)
RBC BLD AUTO: PRESENT
SMUDGE CELLS BLD QL SMEAR: NORMAL
SODIUM SERPL-SCNC: 136 MMOL/L (ref 135–145)
SODIUM SERPL-SCNC: 140 MMOL/L (ref 135–145)
WBC # BLD AUTO: 16.9 K/UL (ref 4.8–10.8)

## 2024-04-29 PROCEDURE — 85027 COMPLETE CBC AUTOMATED: CPT

## 2024-04-29 PROCEDURE — 71045 X-RAY EXAM CHEST 1 VIEW: CPT

## 2024-04-29 PROCEDURE — 83735 ASSAY OF MAGNESIUM: CPT | Mod: 91

## 2024-04-29 PROCEDURE — 700101 HCHG RX REV CODE 250: Performed by: INTERNAL MEDICINE

## 2024-04-29 PROCEDURE — 700111 HCHG RX REV CODE 636 W/ 250 OVERRIDE (IP)

## 2024-04-29 PROCEDURE — A9270 NON-COVERED ITEM OR SERVICE: HCPCS | Performed by: INTERNAL MEDICINE

## 2024-04-29 PROCEDURE — 80048 BASIC METABOLIC PNL TOTAL CA: CPT

## 2024-04-29 PROCEDURE — 700105 HCHG RX REV CODE 258: Performed by: INTERNAL MEDICINE

## 2024-04-29 PROCEDURE — 99233 SBSQ HOSP IP/OBS HIGH 50: CPT | Performed by: INTERNAL MEDICINE

## 2024-04-29 PROCEDURE — 700105 HCHG RX REV CODE 258: Performed by: STUDENT IN AN ORGANIZED HEALTH CARE EDUCATION/TRAINING PROGRAM

## 2024-04-29 PROCEDURE — 700105 HCHG RX REV CODE 258

## 2024-04-29 PROCEDURE — 770020 HCHG ROOM/CARE - TELE (206)

## 2024-04-29 PROCEDURE — 36415 COLL VENOUS BLD VENIPUNCTURE: CPT

## 2024-04-29 PROCEDURE — 700111 HCHG RX REV CODE 636 W/ 250 OVERRIDE (IP): Performed by: INTERNAL MEDICINE

## 2024-04-29 PROCEDURE — 85007 BL SMEAR W/DIFF WBC COUNT: CPT

## 2024-04-29 PROCEDURE — 84145 PROCALCITONIN (PCT): CPT

## 2024-04-29 PROCEDURE — 700102 HCHG RX REV CODE 250 W/ 637 OVERRIDE(OP): Performed by: INTERNAL MEDICINE

## 2024-04-29 PROCEDURE — 80053 COMPREHEN METABOLIC PANEL: CPT

## 2024-04-29 PROCEDURE — 99222 1ST HOSP IP/OBS MODERATE 55: CPT | Performed by: STUDENT IN AN ORGANIZED HEALTH CARE EDUCATION/TRAINING PROGRAM

## 2024-04-29 PROCEDURE — 700111 HCHG RX REV CODE 636 W/ 250 OVERRIDE (IP): Mod: JZ | Performed by: STUDENT IN AN ORGANIZED HEALTH CARE EDUCATION/TRAINING PROGRAM

## 2024-04-29 PROCEDURE — 700101 HCHG RX REV CODE 250

## 2024-04-29 PROCEDURE — 84100 ASSAY OF PHOSPHORUS: CPT | Mod: 91

## 2024-04-29 RX ORDER — QUETIAPINE FUMARATE 25 MG/1
12.5 TABLET, FILM COATED ORAL NIGHTLY
Status: DISCONTINUED | OUTPATIENT
Start: 2024-04-29 | End: 2024-05-08 | Stop reason: HOSPADM

## 2024-04-29 RX ORDER — AMOXICILLIN AND CLAVULANATE POTASSIUM 875; 125 MG/1; MG/1
1 TABLET, FILM COATED ORAL EVERY 12 HOURS
Qty: 4 TABLET | Refills: 0 | Status: COMPLETED | OUTPATIENT
Start: 2024-04-29 | End: 2024-05-02

## 2024-04-29 RX ORDER — MAGNESIUM SULFATE HEPTAHYDRATE 40 MG/ML
4 INJECTION, SOLUTION INTRAVENOUS ONCE
Status: COMPLETED | OUTPATIENT
Start: 2024-04-29 | End: 2024-04-29

## 2024-04-29 RX ORDER — OMEPRAZOLE 20 MG/1
20 CAPSULE, DELAYED RELEASE ORAL DAILY
Status: DISCONTINUED | OUTPATIENT
Start: 2024-04-29 | End: 2024-05-08 | Stop reason: HOSPADM

## 2024-04-29 RX ADMIN — THIAMINE HYDROCHLORIDE 250 MG: 100 INJECTION, SOLUTION INTRAMUSCULAR; INTRAVENOUS at 12:59

## 2024-04-29 RX ADMIN — ENOXAPARIN SODIUM 40 MG: 100 INJECTION SUBCUTANEOUS at 17:39

## 2024-04-29 RX ADMIN — LIDOCAINE 1 PATCH: 4 PATCH TOPICAL at 13:03

## 2024-04-29 RX ADMIN — MAGNESIUM SULFATE HEPTAHYDRATE 4 G: 4 INJECTION, SOLUTION INTRAVENOUS at 05:15

## 2024-04-29 RX ADMIN — MOMETASONE FUROATE AND FORMOTEROL FUMARATE DIHYDRATE 2 PUFF: 100; 5 AEROSOL RESPIRATORY (INHALATION) at 06:00

## 2024-04-29 RX ADMIN — POTASSIUM PHOSPHATE, MONOBASIC AND POTASSIUM PHOSPHATE, DIBASIC 30 MMOL: 224; 236 INJECTION, SOLUTION, CONCENTRATE INTRAVENOUS at 05:54

## 2024-04-29 RX ADMIN — MOMETASONE FUROATE AND FORMOTEROL FUMARATE DIHYDRATE 2 PUFF: 100; 5 AEROSOL RESPIRATORY (INHALATION) at 17:39

## 2024-04-29 RX ADMIN — PREDNISONE 40 MG: 20 TABLET ORAL at 04:30

## 2024-04-29 RX ADMIN — SODIUM CHLORIDE: 9 INJECTION, SOLUTION INTRAVENOUS at 20:28

## 2024-04-29 RX ADMIN — THIAMINE HYDROCHLORIDE 250 MG: 100 INJECTION, SOLUTION INTRAMUSCULAR; INTRAVENOUS at 06:00

## 2024-04-29 RX ADMIN — FOLIC ACID 1 MG: 1 TABLET ORAL at 04:30

## 2024-04-29 RX ADMIN — PIPERACILLIN SODIUM AND TAZOBACTAM SODIUM 3.38 G: 3; .375 INJECTION, POWDER, LYOPHILIZED, FOR SOLUTION INTRAVENOUS at 04:31

## 2024-04-29 RX ADMIN — THERA TABS 1 TABLET: TAB at 04:30

## 2024-04-29 RX ADMIN — TIOTROPIUM BROMIDE INHALATION SPRAY 5 MCG: 3.12 SPRAY, METERED RESPIRATORY (INHALATION) at 06:00

## 2024-04-29 RX ADMIN — QUETIAPINE FUMARATE 12.5 MG: 25 TABLET ORAL at 20:33

## 2024-04-29 RX ADMIN — LORAZEPAM 0.5 MG: 2 INJECTION, SOLUTION INTRAMUSCULAR; INTRAVENOUS at 08:32

## 2024-04-29 RX ADMIN — AMOXICILLIN AND CLAVULANATE POTASSIUM 1 TABLET: 875; 125 TABLET, FILM COATED ORAL at 13:31

## 2024-04-29 RX ADMIN — THIAMINE HYDROCHLORIDE 250 MG: 100 INJECTION, SOLUTION INTRAMUSCULAR; INTRAVENOUS at 17:42

## 2024-04-29 RX ADMIN — OMEPRAZOLE 20 MG: 20 CAPSULE, DELAYED RELEASE ORAL at 13:29

## 2024-04-29 ASSESSMENT — FIBROSIS 4 INDEX: FIB4 SCORE: 1.96

## 2024-04-29 ASSESSMENT — PAIN DESCRIPTION - PAIN TYPE: TYPE: ACUTE PAIN

## 2024-04-29 NOTE — PROGRESS NOTES
Handoff report received from day shift nurse. Pt care assumed. Pt is currently resting in bed. Pt is AAOx1, on RA appears restless, on bilateral NV restraints. on Tele monitoring, and VSS. Will continue to monitor

## 2024-04-29 NOTE — PROGRESS NOTES
[facility] Medicine Daily Progress Note    Date of Service  4/29/2024    Chief Complaint  Ady Blake is a 74 y.o. male admitted 4/26/2024 with fall    Hospital Course:    74-year-old male with history of smoking, alcoholism and hypertension who presented 4/26 with fall. Patient states he is drinking beer every day and smoking half a pack. Patient tripped when he was walking and using cane and fell on his left side, denied any dizziness or lightheadedness, denied any loss of consciousness. Patient lives by himself and usually does all daily activities by himself. Patient was noticed to have cachectic appearance and bruises on his body. Vital signs were stable and patient was on room air. Chest x-ray showed signs of COPD however no signs of pneumonia. CT lumbar, thoracic showed acute compression fracture of T10, CT abdomen showed rib fracture on the left side, seventh, eighth and ninth with diffuse colitis and severe fatty liver disease. Labs showed leukocytosis 17 with elevated liver enzymes ALT 3460 and AST 1279 with total bilirubin 2.1 also patient was found to have bicarb 14 with elevated anion gap and potassium 3.5, IV fluid with CIWA protocol was initiated.  CT head was done and did not show an acute finding.  Patient was very lethargic, CIWA protocol was discontinued to avoid sedation.      Interval Problem Update  -Evaluated examined the patient at bedside, patient is more alert however still confused and having hallucination, continue Ativan as needed and scheduled Seroquel at night.  -Replace electrolytes  -CT head did not show any acute finding, no neurofocal deficit, will hold on MRI for brain at this time.  -Leukocytosis likely related to prednisone  -Discussed with the speech, no dysphagia or risk of aspiration.  - improving on his liver enzymes, holding on MRCP at this time.  -Patient has complicated medical problems and high suspicious for deterioration, consider ICU transfer if  needed.        I have discussed this patient's plan of care and discharge plan at IDT rounds today with Case Management, Nursing, Nursing leadership, and other members of the IDT team.    Consultants/Specialty  None   Code Status  Full Code    Disposition  The patient is not medically cleared for discharge to home or a post-acute facility.  Anticipate discharge to: skilled nursing facility    I have placed the appropriate orders for post-discharge needs.    Review of Systems  Review of Systems   Unable to perform ROS: Mental status change        Physical Exam  Temp:  [36.3 °C (97.3 °F)-36.5 °C (97.7 °F)] 36.3 °C (97.3 °F)  Pulse:  [73-88] 84  Resp:  [18-19] 18  BP: (128-154)/(73-98) 154/98  SpO2:  [92 %-97 %] 97 %    Physical Exam  Constitutional:       General: He is in acute distress.      Appearance: He is ill-appearing.      Comments: Cachectic appearance   Eyes:      General: No scleral icterus.  Cardiovascular:      Rate and Rhythm: Normal rate.      Heart sounds: No murmur heard.  Pulmonary:      Effort: No respiratory distress.      Breath sounds: No wheezing.   Abdominal:      General: There is no distension.      Tenderness: There is no abdominal tenderness. There is no right CVA tenderness, left CVA tenderness or guarding.   Musculoskeletal:      Right lower leg: No edema.      Left lower leg: No edema.   Lymphadenopathy:      Cervical: No cervical adenopathy.   Skin:     Coloration: Skin is not jaundiced.      Findings: Bruising and lesion present. No rash.   Neurological:      General: No focal deficit present.      Mental Status: He is alert. Mental status is at baseline. He is disoriented.      Cranial Nerves: No cranial nerve deficit.      Motor: No weakness.      Gait: Gait normal.         Fluids    Intake/Output Summary (Last 24 hours) at 4/29/2024 1258  Last data filed at 4/29/2024 0800  Gross per 24 hour   Intake 50 ml   Output 550 ml   Net -500 ml       Laboratory  Recent Labs     04/27/24  0802  04/28/24  0237 04/29/24  0249   WBC 10.2 12.2* 16.9*   RBC 3.54* 3.52* 3.58*   HEMOGLOBIN 11.8* 11.4* 11.9*   HEMATOCRIT 32.8* 32.7* 34.1*   MCV 92.7 92.9 95.3   MCH 33.3* 32.4 33.2*   MCHC 36.0 34.9 34.9   RDW 45.2 44.5 47.3   PLATELETCT 139* 161* 175   MPV 10.9 10.4 10.1     Recent Labs     04/27/24  1421 04/28/24  0237 04/29/24  0249   SODIUM 134* 139 140   POTASSIUM 3.9 2.9* 3.2*   CHLORIDE 99 105 106   CO2 20 17* 19*   GLUCOSE 146* 111* 94   BUN 49* 28* 11   CREATININE 0.91 0.73 0.69   CALCIUM 8.1* 8.0* 8.0*     Recent Labs     04/27/24  0401   INR 1.18*               Imaging  DX-CHEST-LIMITED (1 VIEW)   Final Result         1.  No acute cardiopulmonary disease.   2.  Atherosclerosis      CT-HEAD W/O   Final Result      There is no definite acute intracranial abnormality.         EC-ECHOCARDIOGRAM COMPLETE W/O CONT   Final Result      DX-CHEST-LIMITED (1 VIEW)   Final Result      No acute process.      US-RUQ   Final Result         1.  Dilatation the right renal pelvis, appearance most compatible with mild hydronephrosis.   2.  Echogenic liver, compatible with fatty change versus fibrosis.   3.  Atherosclerosis      CT-LSPINE W/O PLUS RECONS   Final Result      Mild multilevel degenerative disease without definite fracture.      CT-TSPINE W/O PLUS RECONS   Final Result      1.  Acute superior endplate compression fracture of the T10 vertebral body with moderate height loss.      CT-CHEST,ABDOMEN,PELVIS WITH   Final Result      1.  Acute left lateral seventh, eighth, ninth rib fractures.   2.  Moderate superior endplate compression fracture of the T10 vertebral body which appears acute with fracture lines visible.   3.  Ileum enteritis.   4.  Diffuse colitis.   5.  Severe fatty liver infiltration.      DX-CHEST-PORTABLE (1 VIEW)   Final Result      No acute cardiac or pulmonary abnormalities are identified.           Assessment/Plan  * Acute respiratory failure with hypoxia (HCC)- (present on  admission)  Assessment & Plan  Patient is on room air  Likely related to COPD  Inhalers    Metabolic encephalopathy  Assessment & Plan  Patient has delirium, multi factors  Likely related to alcoholism, infection, dehydration and hospitalization  CT head did not show any acute finding  Nonpharmacological treatment  Schedule Seroquel at night  Avoid aggressive sedation and avoid antihistamine  Consider EEG or MRI if there is no improving    Rib fracture  Assessment & Plan  Due to falls  No pneumothorax  Pain control  Respiratory hygiene    Elevated liver enzymes  Assessment & Plan  Severely elevated enzymes around thousands   Labs daily  Close monitoring and consider steroid if needed  Ultrasound did not show any acute finding except fatty liver  Improving likely related to alcoholic hepatitis  Holding on MRCP at this time.    Fall  Assessment & Plan  Patient has cachectic appearance, multiple bruises on his body  Patient using cane at home, lives by himself  PT and OT  TSH is normal and vitamin D is pending    Debility  Assessment & Plan  Patient has significant generalized weakness with debility and recurrent falls  PT and OT evaluation  TSH is normal, would likely patient needs placement    Closed fracture of tenth thoracic vertebra (HCC)  Assessment & Plan  From fall  Case was discussed with Dr. Xie neurosurgeon, no intervention needed, brace  Pain control and PT and OT and placement    Leukocytosis  Assessment & Plan  Likely related to stress  No signs of infection or fever, holding on antibiotics at this time    Metabolic acidosis  Assessment & Plan  Likely related to alcoholic, starvation and fall with kidney failure  IV fluids gently, improving on his labs  Infection workup possible aspiration pneumonia:     Malnutrition (HCC)  Assessment & Plan  Cachectic appearance  Nutrition consult  Encourage the patient to quit drinking alcohol and smoking    Tobacco dependence- (present on admission)  Assessment &  Plan  Smoking cessation  Nicotine patch    COPD (chronic obstructive pulmonary disease) ([facility])- (present on admission)  Assessment & Plan  No significant exacerbation  Chest x-ray did not show any signs of infiltrate or or pneumonia  Inhalers,  Pulmonary referral    Alcohol abuse- (present on admission)  Assessment & Plan  Heavy drinking for years  [facility] protocol  High-dose of thiamine and multivitamins  Encouraged the patient to quit    HTN (hypertension)- (present on admission)  Assessment & Plan  Holding blood pressure   Avoid aggressive treatment    ACP (advance care planning)  Assessment & Plan  4/26, patient is alert and oriented x 4, discussed the plan of care with the patient, answered all his questions, discussed the CODE STATUS with the patient, patient wants to be full code.  Time 15 minutes    AVA (acute kidney injury) ([facility])  Assessment & Plan  With creatinine 1.6  Likely related to dehydration  IV fluid and labs daily bladder scan to rule out retention  CPK is normal  Resolved         VTE prophylaxis:   SCDs/TEDs   enoxaparin ppx      I have performed a physical exam and reviewed and updated ROS and Plan today (4/29/2024). In review of yesterday's note (4/28/2024), there are no changes except as documented above.    Greater than 51 minutes spent prepping to see patient (e.g. review of tests) obtaining and/or reviewing separately obtained history. Performing a medically appropriate examination and/ evaluation.  Counseling and educating the patient/family/caregiver.  Ordering medications, tests, or procedures.  Referring and communicating with other health care professionals.  Documenting clinical information in EPIC.  Independently interpreting results and communicating results to patient/family/caregiver.  Care coordination

## 2024-04-29 NOTE — DIETARY
"Nutrition services: Day 3 of admit.  Ady Blake is a 74 y.o. male with admitting DX of Acute respiratory failure with hypoxia (HCC) [J96.01]    Consult received for BMI<19, dx of malnutrition. RD unable to visit pt at bedside to assess this date. Of note, pt not appropriate for interview/NFPE assessment today per other provider notes: documented as \"very lethargic and not following commands.\" Also documented to have delirium and hallucinations.    Assessment:  Height: 165.1 cm (5' 5\")  Weight: 51.6 kg (113 lb 12.1 oz)  Body mass index is 18.93 kg/m²., BMI classification: Normal  Diet/Intake: Level 6 soft/bite-sized, Level 0 - thin liquids, 1:1 supervision by nursing. 0% x 1 meal recorded on 4/27. Pt was NPO noon 4/27 through afternoon 4/28    Evaluation:   PMHx includes chronic airway obstruction, dental disorder, hiatus hernia syndrome, MI, HTN, lap emily, tonsillectomy, alcoholism. Dx list includes acute respiratory failure w/ hypoxia, AVA, T10 fx, COPD, debility, elevated LFTs, fall, leukocystosis, malnutrition, metabolic acidosis, rib fx, tobacco dependence.  Labs: BUN 7, Ca 7.4, phos 2.4, CRP (4/28) 6.12  MAR: ativan, MVI, folvite, NS, prilosec, thiamine in D5  Skin: no documented wounds or edema  Last BM: 4/29, type 7  Wt hx per chart review is limited:  04/29/24 51.6 kg (113 lb 12.1 oz)  08/29/22 61.2 kg (135 lb)    Malnutrition Risk: At risk w/ EtOH hx, BMI below ideal BMI wt range for age. Noted to have \"cachectic\" appearance per MD note.    Recommendations/Plan:  Provide TID Ensure Plus w/ meals to bolster kcal/protein intake for pt at risk of malnturition.   Encourage intake of meals and supplements >50%.  Document intake of all PO as % taken in ADL's to provide interdisciplinary communication across all shifts.   Monitor weight.  Nutrition rep will continue to see patient for ongoing meal and snack preferences.     RD following.  "

## 2024-04-29 NOTE — THERAPY
04/29/24 1341   Interdisciplinary Plan of Care Collaboration   Collaboration Comments OT eval attempted. Per RN, pt very lethargic and not following commands. Will hold and follow for appropriate timing of eval once pt better able to participate.

## 2024-04-29 NOTE — RESPIRATORY CARE
COPD EDUCATION by COPD CLINICAL EDUCATOR  4/29/2024 at 8:52 AM by Janice Woodward, RRT     Patient unable to participate in meaningful discussion. Our team will revisit. His action Plan was updated.

## 2024-04-29 NOTE — CARE PLAN
Problem: Knowledge Deficit - Standard  Goal: Patient and family/care givers will demonstrate understanding of plan of care, disease process/condition, diagnostic tests and medications  Description: Target End Date:  1-3 days or as soon as patient condition allows    Document in Patient Education    1.  Patient and family/caregiver oriented to unit, equipment, visitation policy and means for communicating concern  2.  Complete/review Learning Assessment  3.  Assess knowledge level of disease process/condition, treatment plan, diagnostic tests and medications  4.  Explain disease process/condition, treatment plan, diagnostic tests and medications  Outcome: Progressing     Problem: Knowledge Deficit - COPD  Goal: Patient/significant other demonstrates understanding of disease process, utilization of the Action Plan, medications and discharge instruction  Description: Target End Date:  1-3 days or as soon as patient condition allows    Document in Patient Education    1.  Discuss the importance of medical follow-up care, periodic chest x-rays, sputum cultures  2.  Review worsening signs and symptoms of COPD flare and exacerbation and its management  3.  Discuss respiratory medications, side effects, adverse reactions  4.  Demonstrate technique for using a metered-dose inhaler (MDI) and utilization of a spacer  5.  Review the COPD Action Plan  6.  Instruct and reinforce the rationale for breathing exercises, coughing effectively, and general conditioning exercises  7.  Stress importance of oral care and dental hygiene  8.  Discuss the importance of avoiding people with active respiratory infections and need for routine influenza and pneumococcal vaccinations  9.  Discuss factors that may trigger condition and encourage patient/significant other to explore ways to control these factors in and around the home and work setting  10. Review the harmful effects of smoking and advise cessation of smoking  11. Provide  information about activity limitations and alternating activities with rest periods to prevent fatigue  12. Instruct asthmatic patient of use of peak flow meter, as appropriate  13. Review oxygen requirements and dosage, safe use of oxygen, and refer to the supplier as indicated  14. Educate patient/family/caregiver on the use of Nasal Intermittent Positive Pressure Ventilation (NIPPV) and possible adverse reactions  Outcome: Progressing     Problem: Ineffective Airway Clearance  Goal: Patient will maintain patent airway with clear/clearing breath sounds  Description: Target End Date:  Prior to discharge or change in level of care    1.   Position head midline with flexion appropriate for age and/or condition  2.   Assist patient to assume a position of comfort  3.   Assess and monitor breath sounds  4.   Encourage abdominal or pursed-lip breathing exercises  5.   Assist with measures to improve effectiveness of cough effort  6.   Demonstrate effective coughing and deep-breathing techniques  7.   Assist patient to turn every 2 hours  8.   Encourage patient to ambulate as tolerated  9.   Keep environmental pollution to a minimum  10. Airway suctioning as needed  11. Collaborate with RT to administer medications/treatments per order  12. Promote systemic fluid hydration within cardiac tolerance  13. Provide warm or tepid liquids  Outcome: Progressing     Problem: Impaired Gas Exchange  Goal: Patient will demonstrate improved ventilation and adequate oxygenation and participate in treatment regimen within the level of ability/situation.  Description: Target End Date:  Prior to discharge or change in level of care    1.   Assess/monitor rate/rhythm/depth of effort of respirations  2.   Assess oxygenation as ordered  3.   Administer/titrate oxygen as ordered  4.   Position patient for maximum ventilatory efficiency  5.   Turn, cough, and deep breathe  6.   Vital signs, pulse oximetry  7.   Assess color and body  temperature  8.   Assess and monitor breath sounds  9.   Encourage deep-slow or pursed-lip breathing exercises  10. Monitor changes in the level of consciousness and mental status  11. Encourage expectoration of sputum; airway suctioning  12. Elevate the head of the bed and position patient for maximum ventilatory efficiency  13. Provide a calm, quiet environment  14. Limit patient's activity during the acute phase and have patient resume activity gradually and increase as individually tolerated  15. Evaluate sleep patterns and limit stimulants such as caffeine  16. Collaborate with RT to administer medications/treatments as ordered  Outcome: Progressing     Problem: Risk for Aspiration  Goal: Patient's risk for aspiration will be absent or decrease  Description: Target End Date:  Prior to discharge or change in level of care    1.   Complete dysphagia screening on admission  2.   NPO until dysphagia screening complete or medically cleared  3.   Collaborate with Speech Therapy, Clinical Dietitian and interdisciplinary team  4.   Implement aspiration precautions  5.   Assist patient up to chair for meals  6.   Elevate head of bed 90 degrees if patient is unable to get out of bed  7.   Encourage small bites  8.   Ensure foods/liquids are of appropriate consistency  9.   Assess for any signs/symptoms of aspiration  10. Assess breath sounds and vital signs after oral intake  Outcome: Progressing     Problem: Self Care  Goal: Patient will have the ability to perform ADLs independently or with assistance (bathe, groom, dress, toilet and feed)  Description: Target End Date:  Prior to discharge or change in level of care    Document on ADL flowsheet    1.  Assess the capability and level of deficiency to perform ADLs  2.  Encourage family/care giver involvement  3.  Provide assistive devices  4.  Consider PT/OT evaluations  5.  Maintain support, give positive feedback, encourage self-care allowing extra time and verbal  cuing as needed  6.  Avoid doing something for patients they can do themselves, but provide assistance as needed  7.  Assist in anticipating/planning individual needs  8.  Collaborate with Case Management and  to meet discharge needs  Outcome: Progressing     Problem: Seizure Precautions  Goal: Implementation of seizure precautions  Description: Target End Date:  Prior to discharge or change in level of care    1.  Padded side rails up at all times  2.  Suction equipment and oxygen delivery system at bedside  3.  Continuous pulse oximeter in use  4.  Implement fall precautions, bed alarm on, bed in lowest position  5.  IV access (per order)  6.  Provide low stimulus environment, avoid exposure to triggers  7.  Instruct patient to use call light/seizure button if having warning signs of impending seizure  Outcome: Progressing     Problem: Lifestyle Changes  Goal: Patient's ability to identify lifestyle changes and available resources to help reduce recurrence of condition will improve  Description: Target End Date:  1 to 3 days    1.  Discuss recommended lifestyle changes  2.  Identify available resources and support systems  3.  Consider referral to substance abuse program  Outcome: Progressing     Problem: Psychosocial  Goal: Patient's level of anxiety will decrease  Description: Target End Date:  1-3 days or as soon as patient condition allows    1.  Collaborate with patient and family/caregiver to identify triggers and develop strategies to cope with anxiety  2.  Implement stimuli reduction, calming techniques  3.  Pharmacologic management per provider order  4.  Encourage patient/family/care giver participation  5.  Collaborate with interdisciplinary team including Psychologist or Behavioral Health Team as needed  Outcome: Progressing  Goal: Spiritual and cultural needs incorporated into hospitalization  Description: Target End Date:  End of day 1    1.  Encourage verbalization of feelings,  concerns, expectations and needs  2.  Collaborate with Case Management/  3.  Collaborate with Pastoral Care to meet spiritual needs  Outcome: Progressing     Problem: Fall Risk  Goal: Patient will remain free from falls  Description: Target End Date:  Prior to discharge or change in level of care    Document interventions on the Lianet [facility] Fall Risk Assessment    1.  Assess for fall risk factors  2.  Implement fall precautions  Outcome: Progressing     Problem: Safety - Medical Restraint  Goal: Remains free of injury from restraints (Restraint for Interference with Medical Device)  Description: INTERVENTIONS:  1. Determine that other, less restrictive measures have been tried or would not be effective before applying the restraint  2. Evaluate the patient's condition at the time of restraint application  3. Educate patient/family regarding the reason for restraint  4. Q2H: Monitor safety, psychosocial status, comfort, circulation, respiratory status, LOC, nutrition and hydration  Outcome: Progressing  Goal: Free from restraint(s) (Restraint for Interference with Medical Device)  Description: INTERVENTIONS:  1.  ONCE/SHIFT or MINIMUM Q12H: Assess and document the continuing need for restraints  2.  Q24H: Continued use of restraint requires LIP to perform face to face examination and written order  3.  Identify and implement measures to help patient regain control  4.  Educate patient/family on discontinuation criteria   5.  Assess patient's understanding and retention of education provided  6.  Assess readiness for release & initiate progressive release per protocol  7.  Identify and document criteria for restraints  Outcome: Progressing     Problem: Pain - Standard  Goal: Alleviation of pain or a reduction in pain to the patient’s comfort goal  Description: Target End Date:  Prior to discharge or change in level of care    Document on Vitals flowsheet    1.  Document pain using the appropriate pain  scale per order or unit policy  2.  Educate and implement non-pharmacologic comfort measures (i.e. relaxation, distraction, massage, cold/heat therapy, etc.)  3.  Pain management medications as ordered  4.  Reassess pain after pain med administration per policy  5.  If opiods administered assess patient's response to pain medication is appropriate per POSS sedation scale  6.  Follow pain management plan developed in collaboration with patient and interdisciplinary team (including palliative care or pain specialists if applicable)  Outcome: Progressing   The patient is Stable - Low risk of patient condition declining or worsening    Shift Goals  Clinical Goals: Safety, IV abx, speech eval  Patient Goals: rest, comfort  Family Goals: PREETI    Progress made toward(s) clinical / shift goals:  VSS, safety    Patient is not progressing towards the following goals:

## 2024-04-30 ENCOUNTER — APPOINTMENT (OUTPATIENT)
Dept: RADIOLOGY | Facility: MEDICAL CENTER | Age: 74
DRG: 189 | End: 2024-04-30
Attending: INTERNAL MEDICINE
Payer: MEDICARE

## 2024-04-30 VITALS
OXYGEN SATURATION: 95 % | RESPIRATION RATE: 18 BRPM | SYSTOLIC BLOOD PRESSURE: 138 MMHG | HEIGHT: 65 IN | TEMPERATURE: 97.7 F | BODY MASS INDEX: 18.66 KG/M2 | HEART RATE: 71 BPM | DIASTOLIC BLOOD PRESSURE: 76 MMHG | WEIGHT: 111.99 LBS

## 2024-04-30 LAB
ALBUMIN SERPL BCP-MCNC: 2.9 G/DL (ref 3.2–4.9)
ALBUMIN/GLOB SERPL: 1.3 G/DL
ALP SERPL-CCNC: 68 U/L (ref 30–99)
ALT SERPL-CCNC: 560 U/L (ref 2–50)
ANION GAP SERPL CALC-SCNC: 13 MMOL/L (ref 7–16)
ANISOCYTOSIS BLD QL SMEAR: ABNORMAL
AST SERPL-CCNC: 71 U/L (ref 12–45)
BASOPHILS # BLD AUTO: 0 % (ref 0–1.8)
BASOPHILS # BLD: 0 K/UL (ref 0–0.12)
BILIRUB SERPL-MCNC: 1.1 MG/DL (ref 0.1–1.5)
BUN SERPL-MCNC: 7 MG/DL (ref 8–22)
CALCIUM ALBUM COR SERPL-MCNC: 8.6 MG/DL (ref 8.5–10.5)
CALCIUM SERPL-MCNC: 7.7 MG/DL (ref 8.5–10.5)
CHLORIDE SERPL-SCNC: 106 MMOL/L (ref 96–112)
CO2 SERPL-SCNC: 18 MMOL/L (ref 20–33)
CREAT SERPL-MCNC: 0.48 MG/DL (ref 0.5–1.4)
EOSINOPHIL # BLD AUTO: 0 K/UL (ref 0–0.51)
EOSINOPHIL NFR BLD: 0 % (ref 0–6.9)
ERYTHROCYTE [DISTWIDTH] IN BLOOD BY AUTOMATED COUNT: 47.8 FL (ref 35.9–50)
GFR SERPLBLD CREATININE-BSD FMLA CKD-EPI: 109 ML/MIN/1.73 M 2
GLOBULIN SER CALC-MCNC: 2.3 G/DL (ref 1.9–3.5)
GLUCOSE SERPL-MCNC: 105 MG/DL (ref 65–99)
HCT VFR BLD AUTO: 35.1 % (ref 42–52)
HGB BLD-MCNC: 12.2 G/DL (ref 14–18)
LYMPHOCYTES # BLD AUTO: 7.66 K/UL (ref 1–4.8)
LYMPHOCYTES NFR BLD: 38.3 % (ref 22–41)
MACROCYTES BLD QL SMEAR: ABNORMAL
MAGNESIUM SERPL-MCNC: 1.7 MG/DL (ref 1.5–2.5)
MANUAL DIFF BLD: NORMAL
MCH RBC QN AUTO: 33.1 PG (ref 27–33)
MCHC RBC AUTO-ENTMCNC: 34.8 G/DL (ref 32.3–36.5)
MCV RBC AUTO: 95.1 FL (ref 81.4–97.8)
MONOCYTES # BLD AUTO: 0.34 K/UL (ref 0–0.85)
MONOCYTES NFR BLD AUTO: 1.7 % (ref 0–13.4)
MORPHOLOGY BLD-IMP: NORMAL
NEUTROPHILS # BLD AUTO: 12 K/UL (ref 1.82–7.42)
NEUTROPHILS NFR BLD: 60 % (ref 44–72)
NRBC # BLD AUTO: 0 K/UL
NRBC BLD-RTO: 0 /100 WBC (ref 0–0.2)
PHOSPHATE SERPL-MCNC: 1.8 MG/DL (ref 2.5–4.5)
PLATELET # BLD AUTO: 179 K/UL (ref 164–446)
PLATELET BLD QL SMEAR: NORMAL
PMV BLD AUTO: 10.1 FL (ref 9–12.9)
POTASSIUM SERPL-SCNC: 3.5 MMOL/L (ref 3.6–5.5)
PROT SERPL-MCNC: 5.2 G/DL (ref 6–8.2)
RBC # BLD AUTO: 3.69 M/UL (ref 4.7–6.1)
RBC BLD AUTO: PRESENT
SMUDGE CELLS BLD QL SMEAR: NORMAL
SODIUM SERPL-SCNC: 137 MMOL/L (ref 135–145)
WBC # BLD AUTO: 20 K/UL (ref 4.8–10.8)

## 2024-04-30 PROCEDURE — 700101 HCHG RX REV CODE 250: Performed by: INTERNAL MEDICINE

## 2024-04-30 PROCEDURE — 70551 MRI BRAIN STEM W/O DYE: CPT

## 2024-04-30 PROCEDURE — A9270 NON-COVERED ITEM OR SERVICE: HCPCS | Performed by: INTERNAL MEDICINE

## 2024-04-30 PROCEDURE — 99233 SBSQ HOSP IP/OBS HIGH 50: CPT | Performed by: INTERNAL MEDICINE

## 2024-04-30 PROCEDURE — 700111 HCHG RX REV CODE 636 W/ 250 OVERRIDE (IP): Performed by: INTERNAL MEDICINE

## 2024-04-30 PROCEDURE — 700102 HCHG RX REV CODE 250 W/ 637 OVERRIDE(OP): Performed by: INTERNAL MEDICINE

## 2024-04-30 PROCEDURE — 97535 SELF CARE MNGMENT TRAINING: CPT

## 2024-04-30 PROCEDURE — 36415 COLL VENOUS BLD VENIPUNCTURE: CPT

## 2024-04-30 PROCEDURE — 85007 BL SMEAR W/DIFF WBC COUNT: CPT

## 2024-04-30 PROCEDURE — 84100 ASSAY OF PHOSPHORUS: CPT

## 2024-04-30 PROCEDURE — 700105 HCHG RX REV CODE 258: Performed by: INTERNAL MEDICINE

## 2024-04-30 PROCEDURE — 83735 ASSAY OF MAGNESIUM: CPT

## 2024-04-30 PROCEDURE — 80053 COMPREHEN METABOLIC PANEL: CPT

## 2024-04-30 PROCEDURE — 85027 COMPLETE CBC AUTOMATED: CPT

## 2024-04-30 PROCEDURE — 770020 HCHG ROOM/CARE - TELE (206)

## 2024-04-30 RX ORDER — LORAZEPAM 2 MG/ML
0.5 INJECTION INTRAMUSCULAR EVERY 8 HOURS PRN
Status: DISCONTINUED | OUTPATIENT
Start: 2024-04-30 | End: 2024-05-08 | Stop reason: HOSPADM

## 2024-04-30 RX ORDER — POTASSIUM CHLORIDE 20 MEQ/1
40 TABLET, EXTENDED RELEASE ORAL ONCE
Status: COMPLETED | OUTPATIENT
Start: 2024-04-30 | End: 2024-04-30

## 2024-04-30 RX ORDER — GAUZE BANDAGE 2" X 2"
100 BANDAGE TOPICAL DAILY
Status: DISCONTINUED | OUTPATIENT
Start: 2024-04-30 | End: 2024-05-08 | Stop reason: HOSPADM

## 2024-04-30 RX ADMIN — AMLODIPINE BESYLATE 5 MG: 5 TABLET ORAL at 05:57

## 2024-04-30 RX ADMIN — QUETIAPINE FUMARATE 12.5 MG: 25 TABLET ORAL at 20:13

## 2024-04-30 RX ADMIN — MOMETASONE FUROATE AND FORMOTEROL FUMARATE DIHYDRATE 2 PUFF: 100; 5 AEROSOL RESPIRATORY (INHALATION) at 06:13

## 2024-04-30 RX ADMIN — THERA TABS 1 TABLET: TAB at 05:57

## 2024-04-30 RX ADMIN — TIOTROPIUM BROMIDE INHALATION SPRAY 5 MCG: 3.12 SPRAY, METERED RESPIRATORY (INHALATION) at 06:14

## 2024-04-30 RX ADMIN — OMEPRAZOLE 20 MG: 20 CAPSULE, DELAYED RELEASE ORAL at 05:56

## 2024-04-30 RX ADMIN — SODIUM CHLORIDE: 9 INJECTION, SOLUTION INTRAVENOUS at 09:27

## 2024-04-30 RX ADMIN — FOLIC ACID 1 MG: 1 TABLET ORAL at 05:57

## 2024-04-30 RX ADMIN — LIDOCAINE 1 PATCH: 4 PATCH TOPICAL at 12:13

## 2024-04-30 RX ADMIN — ENOXAPARIN SODIUM 40 MG: 100 INJECTION SUBCUTANEOUS at 17:55

## 2024-04-30 RX ADMIN — MOMETASONE FUROATE AND FORMOTEROL FUMARATE DIHYDRATE 2 PUFF: 100; 5 AEROSOL RESPIRATORY (INHALATION) at 17:55

## 2024-04-30 RX ADMIN — AMOXICILLIN AND CLAVULANATE POTASSIUM 1 TABLET: 875; 125 TABLET, FILM COATED ORAL at 05:57

## 2024-04-30 RX ADMIN — PREDNISONE 40 MG: 20 TABLET ORAL at 05:57

## 2024-04-30 RX ADMIN — DIBASIC SODIUM PHOSPHATE, MONOBASIC POTASSIUM PHOSPHATE AND MONOBASIC SODIUM PHOSPHATE 250 MG: 852; 155; 130 TABLET ORAL at 17:54

## 2024-04-30 RX ADMIN — AMOXICILLIN AND CLAVULANATE POTASSIUM 1 TABLET: 875; 125 TABLET, FILM COATED ORAL at 17:54

## 2024-04-30 RX ADMIN — POTASSIUM CHLORIDE 40 MEQ: 1500 TABLET, EXTENDED RELEASE ORAL at 08:54

## 2024-04-30 RX ADMIN — Medication 100 MG: at 17:54

## 2024-04-30 ASSESSMENT — COGNITIVE AND FUNCTIONAL STATUS - GENERAL
DAILY ACTIVITIY SCORE: 12
TURNING FROM BACK TO SIDE WHILE IN FLAT BAD: A LOT
STANDING UP FROM CHAIR USING ARMS: A LOT
MOBILITY SCORE: 12
WALKING IN HOSPITAL ROOM: A LOT
MOVING TO AND FROM BED TO CHAIR: A LOT
EATING MEALS: A LOT
MOVING FROM LYING ON BACK TO SITTING ON SIDE OF FLAT BED: A LOT
SUGGESTED CMS G CODE MODIFIER DAILY ACTIVITY: CL
DRESSING REGULAR UPPER BODY CLOTHING: A LOT
PERSONAL GROOMING: A LOT
CLIMB 3 TO 5 STEPS WITH RAILING: A LOT
HELP NEEDED FOR BATHING: A LOT
SUGGESTED CMS G CODE MODIFIER MOBILITY: CL
TOILETING: A LOT
DRESSING REGULAR LOWER BODY CLOTHING: A LOT

## 2024-04-30 ASSESSMENT — PAIN DESCRIPTION - PAIN TYPE: TYPE: ACUTE PAIN

## 2024-04-30 ASSESSMENT — FIBROSIS 4 INDEX: FIB4 SCORE: 1.25

## 2024-04-30 NOTE — PROGRESS NOTES
Monitor Summary:     Rhythm: SR, SB  Rate: 58-86  Ectopy: PAC  Measurements: 0.16/0.08/0.38           12 Hour Chart Check

## 2024-04-30 NOTE — PROGRESS NOTES
[facility] Medicine Daily Progress Note    Date of Service  4/30/2024    Chief Complaint  Ady Blake is a 74 y.o. male admitted 4/26/2024 with fall    Hospital Course:    74-year-old male with history of smoking, alcoholism and hypertension who presented 4/26 with fall. Patient states he is drinking beer every day and smoking half a pack. Patient tripped when he was walking and using cane and fell on his left side, denied any dizziness or lightheadedness, denied any loss of consciousness. Patient lives by himself and usually does all daily activities by himself. Patient was noticed to have cachectic appearance and bruises on his body. Vital signs were stable and patient was on room air. Chest x-ray showed signs of COPD however no signs of pneumonia. CT lumbar, thoracic showed acute compression fracture of T10, CT abdomen showed rib fracture on the left side, seventh, eighth and ninth with diffuse colitis and severe fatty liver disease. Labs showed leukocytosis 17 with elevated liver enzymes ALT 3460 and AST 1279 with total bilirubin 2.1 also patient was found to have bicarb 14 with elevated anion gap and potassium 3.5, IV fluid with CIWA protocol was initiated.  CT head was done and did not show an acute finding.  Patient was very lethargic, CIWA protocol was discontinued to avoid sedation.      Interval Problem Update  -Evaluated examined the patient at bedside, patient is more alert however still confused and having hallucination, continue Ativan as needed and scheduled Seroquel at night.  -Replace electrolytes  -CT head did not show any acute finding, no neurofocal deficit, will hold on MRI for brain at this time.  -Leukocytosis likely related to prednisone  -Discussed with the speech, no dysphagia or risk of aspiration.  - improving on his liver enzymes, holding on MRCP at this time.  -Patient has complicated medical problems and high suspicious for deterioration, consider ICU transfer if  needed.  4/30: Patient seen and examined afebrile, still confused, finished steroid course, today, wbc up to 20 possible related to steroid use   Hypokalemia: potassium of 3.5 repleting   I have discussed this patient's plan of care and discharge plan at IDT rounds today with Case Management, Nursing, Nursing leadership, and other members of the IDT team.    Consultants/Specialty  None   Code Status  Full Code    Disposition  The patient is not medically cleared for discharge to home or a post-acute facility.      I have placed the appropriate orders for post-discharge needs.    Review of Systems  Review of Systems   Unable to perform ROS: Mental status change        Physical Exam  Temp:  [36.3 °C (97.3 °F)-37.3 °C (99.1 °F)] 36.5 °C (97.7 °F)  Pulse:  [60-96] 71  Resp:  [16-19] 16  BP: (103-152)/(55-85) 103/62  SpO2:  [92 %-98 %] 95 %    Physical Exam  Constitutional:       General: He is in acute distress.      Appearance: He is ill-appearing.      Comments: Cachectic appearance   Eyes:      General: No scleral icterus.  Cardiovascular:      Rate and Rhythm: Normal rate.      Heart sounds: No murmur heard.  Pulmonary:      Effort: No respiratory distress.      Breath sounds: No wheezing.   Abdominal:      General: There is no distension.      Tenderness: There is no abdominal tenderness. There is no right CVA tenderness, left CVA tenderness or guarding.   Musculoskeletal:      Right lower leg: No edema.      Left lower leg: No edema.   Lymphadenopathy:      Cervical: No cervical adenopathy.   Skin:     Coloration: Skin is not jaundiced.      Findings: Bruising and lesion present. No rash.   Neurological:      General: No focal deficit present.      Mental Status: He is alert. Mental status is at baseline. He is disoriented.      Cranial Nerves: No cranial nerve deficit.      Motor: No weakness.      Gait: Gait normal.         Fluids    Intake/Output Summary (Last 24 hours) at 4/30/2024 7849  Last data filed at  4/30/2024 1229  Gross per 24 hour   Intake 420 ml   Output 1100 ml   Net -680 ml       Laboratory  Recent Labs     04/28/24  0237 04/29/24  0249 04/30/24  0415   WBC 12.2* 16.9* 20.0*   RBC 3.52* 3.58* 3.69*   HEMOGLOBIN 11.4* 11.9* 12.2*   HEMATOCRIT 32.7* 34.1* 35.1*   MCV 92.9 95.3 95.1   MCH 32.4 33.2* 33.1*   MCHC 34.9 34.9 34.8   RDW 44.5 47.3 47.8   PLATELETCT 161* 175 179   MPV 10.4 10.1 10.1     Recent Labs     04/29/24  0249 04/29/24  1503 04/30/24  0218   SODIUM 140 136 137   POTASSIUM 3.2* 3.7 3.5*   CHLORIDE 106 103 106   CO2 19* 19* 18*   GLUCOSE 94 99 105*   BUN 11 7* 7*   CREATININE 0.69 0.56 0.48*   CALCIUM 8.0* 7.4* 7.7*                     Imaging  DX-CHEST-LIMITED (1 VIEW)   Final Result         1.  No acute cardiopulmonary disease.   2.  Atherosclerosis      CT-HEAD W/O   Final Result      There is no definite acute intracranial abnormality.         EC-ECHOCARDIOGRAM COMPLETE W/O CONT   Final Result      DX-CHEST-LIMITED (1 VIEW)   Final Result      No acute process.      US-RUQ   Final Result         1.  Dilatation the right renal pelvis, appearance most compatible with mild hydronephrosis.   2.  Echogenic liver, compatible with fatty change versus fibrosis.   3.  Atherosclerosis      CT-LSPINE W/O PLUS RECONS   Final Result      Mild multilevel degenerative disease without definite fracture.      CT-TSPINE W/O PLUS RECONS   Final Result      1.  Acute superior endplate compression fracture of the T10 vertebral body with moderate height loss.      CT-CHEST,ABDOMEN,PELVIS WITH   Final Result      1.  Acute left lateral seventh, eighth, ninth rib fractures.   2.  Moderate superior endplate compression fracture of the T10 vertebral body which appears acute with fracture lines visible.   3.  Ileum enteritis.   4.  Diffuse colitis.   5.  Severe fatty liver infiltration.      DX-CHEST-PORTABLE (1 VIEW)   Final Result      No acute cardiac or pulmonary abnormalities are identified.            Assessment/Plan  * Acute respiratory failure with hypoxia (HCC)- (present on admission)  Assessment & Plan  Patient is on room air  Likely related to COPD  Inhalers    Metabolic encephalopathy  Assessment & Plan  Patient has delirium, multi factors  Likely related to alcoholism, infection, dehydration and hospitalization  CT head did not show any acute finding  Nonpharmacological treatment  Schedule Seroquel at night  Avoid aggressive sedation and avoid antihistamine  Consider EEG or MRI if there is no improving    Rib fracture  Assessment & Plan  Due to falls  No pneumothorax  Pain control  Respiratory hygiene    Debility  Assessment & Plan  Patient has significant generalized weakness with debility and recurrent falls  PT and OT evaluation  TSH is normal, would likely patient needs placement    Closed fracture of tenth thoracic vertebra (HCC)  Assessment & Plan  From fall  Case was discussed with Dr. Xie neurosurgeon, no intervention needed, brace  Pain control and PT and OT and placement    ACP (advance care planning)  Assessment & Plan  4/26, patient is alert and oriented x 4, discussed the plan of care with the patient, answered all his questions, discussed the CODE STATUS with the patient, patient wants to be full code.  Time 15 minutes    Leukocytosis  Assessment & Plan  Likely related to stress  No signs of infection or fever, holding on antibiotics at this time    Metabolic acidosis  Assessment & Plan  Likely related to alcoholic, starvation and fall with kidney failure  IV fluids gently, improving on his labs  Infection workup possible aspiration pneumonia:     AVA (acute kidney injury) (HCC)  Assessment & Plan  With creatinine 1.6  Likely related to dehydration  IV fluid and labs daily bladder scan to rule out retention  CPK is normal  Resolved    Elevated liver enzymes  Assessment & Plan  Severely elevated enzymes around thousands   Labs daily  Close monitoring and consider steroid if  needed  Ultrasound did not show any acute finding except fatty liver  Improving likely related to alcoholic hepatitis  Holding on MRCP at this time.    Malnutrition (HCC)  Assessment & Plan  Cachectic appearance  Nutrition consult  Encourage the patient to quit drinking alcohol and smoking    Fall  Assessment & Plan  Patient has cachectic appearance, multiple bruises on his body  Patient using cane at home, lives by himself  PT and OT  TSH is normal and vitamin D is pending    Tobacco dependence- (present on admission)  Assessment & Plan  Smoking cessation  Nicotine patch    COPD (chronic obstructive pulmonary disease) (HCC)- (present on admission)  Assessment & Plan  No significant exacerbation  Chest x-ray did not show any signs of infiltrate or or pneumonia  Inhalers,  Pulmonary referral    Alcohol abuse- (present on admission)  Assessment & Plan  Heavy drinking for years  [facility] protocol  High-dose of thiamine and multivitamins  Encouraged the patient to quit    HTN (hypertension)- (present on admission)  Assessment & Plan  Holding blood pressure   Avoid aggressive treatment         VTE prophylaxis: Lovenox    Greater than 51 minutes spent prepping to see patient (e.g. review of tests) obtaining and/or reviewing separately obtained history. Performing a medically appropriate examination and/ evaluation.  Counseling and educating the patient/family/caregiver.  Ordering medications, tests, or procedures.  Referring and communicating with other health care professionals.  Documenting clinical information in EPIC.  Independently interpreting results and communicating results to patient/family/caregiver.  Care coordination.      I have performed a physical exam and reviewed and updated ROS and Plan today (4/30/2024). In review of yesterday's note (4/29/2024), there are no changes except as documented above.

## 2024-04-30 NOTE — PROGRESS NOTES
Report received from day shift RN, assumed patient care. Patient is calmly resting in bed, no signs of distress, even and unlabored breathing noted. Pt on room air. Bilateral soft wrist restraints on, good circulation and skin intact. A&Ox2. Tele box on and in place. Patient has call light within reach, fall precautions in place. Patient states no needs at this time. Hourly rounding initiated.

## 2024-04-30 NOTE — DISCHARGE PLANNING
"Care Transition Team Assessment    Patient is 74-year-old male admitted for acute respiratory failure with hypoxic. Patient is alert and oriented x2; disoriented to time and situation, patient's son assisted with completing assessment. Please see pt's H&P for prior medical history. Patient lives alone; son and daughter both live locally. Per son, he and his sister would be willing to make decisions for patient if mentation does not improve. Per patient's son, his father is oriented x4 normally and was \"totally fine\" when brought into the ER by son. Patient reports, pt is independent with ADL's and does not utilize DME; pt's son states pt has a walker that he refuses to use and attempted to use a can when he fell this past Sunday PTA. Medical insurance [employer] and Medicaid FFS. The patient's son has his insurance cards. Per patient's son, he does not know how much income the patient receives but he does receive SSI. Per patient's son, his father drinks about a quarter of a beer and two shots every day. Per patient's son, the patient has a hx with drug use but that ended \"years ago\" except for marijuana which patient uses occasionally. No hx or concerns with patient having hx with MH. SW advised that discharge planning will need to be discussed. Pt's son verbalized understanding. SW notified pt's son that SNF referrals will be sent. Pt is currently on restraints.    Information Source  Orientation Level: Oriented to place, Oriented to person, Disoriented to time, Disoriented to situation  Information Given By: Relative  Informant's Name: Elmer Blake  Who is responsible for making decisions for patient? : Legal next of kin  Name(s) of Primary Decision Maker: Nathan Blake    Readmission Evaluation  Is this a readmission?: No    Elopement Risk  Legal Hold: No  Ambulatory or Self Mobile in Wheelchair: No-Not an Elopement Risk  Elopement Risk: Not at Risk for Elopement    Interdisciplinary " "Discharge Planning  Primary Care Physician: Mark Benito  Lives with - Patient's Self Care Capacity: Unable To Determine At This Time  Support Systems: Children    Discharge Preparedness  What is your plan after discharge?: Uncertain - pending medical team collaboration  What are your discharge supports?: Child  Prior Functional Level: Ambulatory, Other (Comments) (Refuses to use walker, attempted to use cane when he fell PTA)    Functional Assesment  Prior Functional Level: Ambulatory, Other (Comments) (Refuses to use walker, attempted to use cane when he fell PTA)    Vision / Hearing Impairment  Hearing Impairment: Both Ears    Advance Directive  Advance Directive?: None    Domestic Abuse  Have you ever been the victim of abuse or violence?: No    Psychological Assessment  History of Substance Abuse: Alcohol, Other (comment) (\"Drugs\" (non-specified))  Date Last Used - Alcohol: PTA  Substance Abuse Comments: Drinks about 1/4 a beer and 2 shots a day    Anticipated Discharge Information  Discharge Disposition: D/T to SNF with Medicare cert in anticipation of skilled care (03)  "

## 2024-04-30 NOTE — CONSULTS
Pulmonary Consultation    Date of consult: 4/29/2024    Referring Physician  Nanette Miranda M.D.    Reason for Consultation  COPD    History of Presenting Illness  74 y.o. male with history of tobacco use, alcohol use, hypertension who presented 4/26/2024 with fall.  Patient was apparently noted to be drinking and smoking throughout the day and was noted to have cachectic appearance bruises all over his body.  He apparently lives by himself and does all his ADLs.  He was started on IV fluids and CIWA protocol for concern for alcohol withdrawal.  He was then noted to become increasingly lethargic so CIWA protocol was discontinued.  During my visit, patient was confused and intermittently agitated.  He was not able to provide any meaningful history.  He is on room air.    Code Status  Full Code    Review of Systems  Review of Systems   Unable to perform ROS: Mental status change       Past Medical History   has a past medical history of ACP (advance care planning) (4/26/2024), Arthritis, Back pain, Bronchitis (8/29/2022), Chronic airway obstruction, not elsewhere classified, Dental disorder, Fall (4/26/2024), Glaucoma, Hiatus hernia syndrome, HTN (hypertension) (6/14/2015), Myocardial infarct (HCC), and Unstable angina (HCC) (6/14/2015).    He has no past medical history of Asthma, Cancer (HCC), Congestive heart failure (HCC), Heart murmur, Heart valve disease, Indigestion, Infectious disease, Jaundice, Other specified symptom associated with female genital organs, Pacemaker, Personal history of venous thrombosis and embolism, Pneumonia, Psychiatric problem, Renal disorder, Rheumatic fever, Seizure (HCC), Stroke (HCC), Type II or unspecified type diabetes mellitus without mention of complication, not stated as uncontrolled, Unspecified cataract, Unspecified disorder of thyroid, Unspecified hemorrhagic conditions, or Unspecified urinary incontinence.    Surgical History   has a past surgical history that includes  tonsillectomy; emily by laparoscopy; and inguinal hernia laparoscopic (11/10/2014).    Family History  family history is not on file.    Social History   reports that he has been smoking cigarettes. He has a 40 pack-year smoking history. He has never used smokeless tobacco. He reports current alcohol use. He reports current drug use.    Medications  Home Medications       Reviewed by Louis Heredia ([facility]) on 04/26/24 at 1409  Med List Status: Complete     Medication Last Dose Status   acetaminophen (TYLENOL) 500 MG Tab 4/25/2024 Active   albuterol (VENTOLIN OR PROVENTIL) 108 (90 BASE) MCG/ACT AERS inhalation aerosol PRN Active   amLODIPine (NORVASC) 5 MG Tab 4/24/2024 Active   losartan (COZAAR) 100 MG TABS 4/24/2024 Active   SYMBICORT 160-4.5 MCG/ACT Aerosol 4/25/2024 Active                  Current Facility-Administered Medications   Medication Dose Route Frequency Provider Last Rate Last Admin    QUEtiapine (SEROquel) tablet 12.5 mg  12.5 mg Oral Nightly Nanette Miranda M.D.   12.5 mg at 04/29/24 2033    amoxicillin-clavulanate (Augmentin) 875-125 MG per tablet 1 Tablet  1 Tablet Oral Q12HRS Nanette Miranda M.D.   1 Tablet at 04/29/24 1331    omeprazole (PriLOSEC) capsule 20 mg  20 mg Oral DAILY Nanette Miranda M.D.   20 mg at 04/29/24 1329    mometasone-formoterol (Dulera) 100-5 MCG/ACT inhaler 2 Puff  2 Puff Inhalation BID Nanette Miranda M.D.   2 Puff at 04/29/24 1739    tiotropium (Spiriva Respimat) 2.5 mcg/Act inhalation spray 5 mcg  5 mcg Inhalation DAILY Nanette Miranda M.D.   5 mcg at 04/29/24 0600    Respiratory Therapy Consult   Nebulization Continuous RT Nanette Miranda M.D.        ipratropium-albuterol (DUONEB) nebulizer solution  3 mL Nebulization Q2HRS PRN (RT) Nanette Miranda M.D.        predniSONE (Deltasone) tablet 40 mg  40 mg Oral DAILY Nanette Miranda M.D.   40 mg at 04/29/24 0430    oxyCODONE immediate-release (Roxicodone) tablet 5 mg  5 mg Oral Q4HRS PRN  Nanette Miranda M.D.        lidocaine (Asperflex) 4 % patch 1 Patch  1 Patch Transdermal Q24HR Nanette Miranda M.D.   1 Patch at 04/29/24 1303    LORazepam (Ativan) injection 0.5 mg  0.5 mg Intravenous Q6HRS PRN Nanette Miranda M.D.   0.5 mg at 04/29/24 0832    albuterol inhaler 1 Puff  1 Puff Inhalation Q4HRS PRN Nanette Miranda M.D.        amLODIPine (Norvasc) tablet 5 mg  5 mg Oral DAILY Nanette Miranda M.D.        Respiratory Therapy Consult   Nebulization Continuous RT Nanette Miranda M.D.        NS infusion   Intravenous Continuous Nanette Miranda M.D. 75 mL/hr at 04/29/24 2028 New Bag at 04/29/24 2028    enoxaparin (Lovenox) inj 40 mg  40 mg Subcutaneous DAILY AT 1800 Nanette Miranda M.D.   40 mg at 04/29/24 1739    labetalol (Normodyne/Trandate) injection 10 mg  10 mg Intravenous Q4HRS PRN Nanette Miranda M.D.        multivitamin tablet 1 Tablet  1 Tablet Oral DAILY Nanette Miranda M.D.   1 Tablet at 04/29/24 0430    And    folic acid (Folvite) tablet 1 mg  1 mg Oral DAILY Nanette Miranda M.D.   1 mg at 04/29/24 0430       Allergies  No Known Allergies    Vital Signs last 24 hours  Temp:  [36.3 °C (97.3 °F)-36.5 °C (97.7 °F)] 36.3 °C (97.3 °F)  Pulse:  [61-88] 82  Resp:  [18-19] 19  BP: (123-154)/(70-98) 143/85  SpO2:  [92 %-98 %] 93 %    Physical Exam  Physical Exam  Vitals reviewed.   Constitutional:       General: He is not in acute distress.  HENT:      Head: Normocephalic.      Mouth/Throat:      Mouth: Mucous membranes are moist.   Eyes:      Conjunctiva/sclera: Conjunctivae normal.   Cardiovascular:      Rate and Rhythm: Normal rate.   Pulmonary:      Effort: Pulmonary effort is normal. No respiratory distress.      Breath sounds: No wheezing.   Abdominal:      Palpations: Abdomen is soft.   Musculoskeletal:         General: Signs of injury present.   Skin:     General: Skin is warm.   Neurological:      Comments: Agitated, disoriented, not following commands. Moving  all extremities spontaneously         Fluids    Intake/Output Summary (Last 24 hours) at 4/29/2024 2214  Last data filed at 4/29/2024 2000  Gross per 24 hour   Intake 220 ml   Output 1000 ml   Net -780 ml       Laboratory  Recent Results (from the past 48 hour(s))   MAGNESIUM    Collection Time: 04/28/24  2:37 AM   Result Value Ref Range    Magnesium 1.9 1.5 - 2.5 mg/dL   PHOSPHORUS    Collection Time: 04/28/24  2:37 AM   Result Value Ref Range    Phosphorus 1.5 (L) 2.5 - 4.5 mg/dL   PROCALCITONIN    Collection Time: 04/28/24  2:37 AM   Result Value Ref Range    Procalcitonin 7.39 (H) <0.25 ng/mL   Comp Metabolic Panel    Collection Time: 04/28/24  2:37 AM   Result Value Ref Range    Sodium 139 135 - 145 mmol/L    Potassium 2.9 (L) 3.6 - 5.5 mmol/L    Chloride 105 96 - 112 mmol/L    Co2 17 (L) 20 - 33 mmol/L    Anion Gap 17.0 (H) 7.0 - 16.0    Glucose 111 (H) 65 - 99 mg/dL    Bun 28 (H) 8 - 22 mg/dL    Creatinine 0.73 0.50 - 1.40 mg/dL    Calcium 8.0 (L) 8.5 - 10.5 mg/dL    Correct Calcium 8.6 8.5 - 10.5 mg/dL    AST(SGOT) 209 (H) 12 - 45 U/L    ALT(SGPT) 1106 (HH) 2 - 50 U/L    Alkaline Phosphatase 67 30 - 99 U/L    Total Bilirubin 1.7 (H) 0.1 - 1.5 mg/dL    Albumin 3.2 3.2 - 4.9 g/dL    Total Protein 5.5 (L) 6.0 - 8.2 g/dL    Globulin 2.3 1.9 - 3.5 g/dL    A-G Ratio 1.4 g/dL   CBC WITH DIFFERENTIAL    Collection Time: 04/28/24  2:37 AM   Result Value Ref Range    WBC 12.2 (H) 4.8 - 10.8 K/uL    RBC 3.52 (L) 4.70 - 6.10 M/uL    Hemoglobin 11.4 (L) 14.0 - 18.0 g/dL    Hematocrit 32.7 (L) 42.0 - 52.0 %    MCV 92.9 81.4 - 97.8 fL    MCH 32.4 27.0 - 33.0 pg    MCHC 34.9 32.3 - 36.5 g/dL    RDW 44.5 35.9 - 50.0 fL    Platelet Count 161 (L) 164 - 446 K/uL    MPV 10.4 9.0 - 12.9 fL    Neutrophils-Polys 43.20 (L) 44.00 - 72.00 %    Lymphocytes 51.70 (H) 22.00 - 41.00 %    Monocytes 5.10 0.00 - 13.40 %    Eosinophils 0.00 0.00 - 6.90 %    Basophils 0.00 0.00 - 1.80 %    Nucleated RBC 0.20 0.00 - 0.20 /100 WBC    Neutrophils  (Absolute) 5.27 1.82 - 7.42 K/uL    Lymphs (Absolute) 6.31 (H) 1.00 - 4.80 K/uL    Monos (Absolute) 0.62 0.00 - 0.85 K/uL    Eos (Absolute) 0.00 0.00 - 0.51 K/uL    Baso (Absolute) 0.00 0.00 - 0.12 K/uL    NRBC (Absolute) 0.02 K/uL    Anisocytosis 1+    CRP QUANTITIVE (NON-CARDIAC)    Collection Time: 04/28/24  2:37 AM   Result Value Ref Range    Stat C-Reactive Protein 6.12 (H) 0.00 - 0.75 mg/dL   DIFFERENTIAL MANUAL    Collection Time: 04/28/24  2:37 AM   Result Value Ref Range    Manual Diff Status PERFORMED     Comment See Comment    PERIPHERAL SMEAR REVIEW    Collection Time: 04/28/24  2:37 AM   Result Value Ref Range    Peripheral Smear Review see below    MORPHOLOGY    Collection Time: 04/28/24  2:37 AM   Result Value Ref Range    RBC Morphology Present     Smudge Cells Many    PLATELET ESTIMATE    Collection Time: 04/28/24  2:37 AM   Result Value Ref Range    Plt Estimation Normal    ESTIMATED GFR    Collection Time: 04/28/24  2:37 AM   Result Value Ref Range    GFR (CKD-EPI) 96 >60 mL/min/1.73 m 2   AMMONIA    Collection Time: 04/28/24 11:51 AM   Result Value Ref Range    Ammonia 23 11 - 45 umol/L   Comp Metabolic Panel    Collection Time: 04/29/24  2:49 AM   Result Value Ref Range    Sodium 140 135 - 145 mmol/L    Potassium 3.2 (L) 3.6 - 5.5 mmol/L    Chloride 106 96 - 112 mmol/L    Co2 19 (L) 20 - 33 mmol/L    Anion Gap 15.0 7.0 - 16.0    Glucose 94 65 - 99 mg/dL    Bun 11 8 - 22 mg/dL    Creatinine 0.69 0.50 - 1.40 mg/dL    Calcium 8.0 (L) 8.5 - 10.5 mg/dL    Correct Calcium 8.6 8.5 - 10.5 mg/dL    AST(SGOT) 137 (H) 12 - 45 U/L    ALT(SGPT) 846 (H) 2 - 50 U/L    Alkaline Phosphatase 72 30 - 99 U/L    Total Bilirubin 1.7 (H) 0.1 - 1.5 mg/dL    Albumin 3.2 3.2 - 4.9 g/dL    Total Protein 5.6 (L) 6.0 - 8.2 g/dL    Globulin 2.4 1.9 - 3.5 g/dL    A-G Ratio 1.3 g/dL   MAGNESIUM    Collection Time: 04/29/24  2:49 AM   Result Value Ref Range    Magnesium 1.4 (L) 1.5 - 2.5 mg/dL   PHOSPHORUS    Collection Time:  04/29/24  2:49 AM   Result Value Ref Range    Phosphorus 1.7 (L) 2.5 - 4.5 mg/dL   CBC WITH DIFFERENTIAL    Collection Time: 04/29/24  2:49 AM   Result Value Ref Range    WBC 16.9 (H) 4.8 - 10.8 K/uL    RBC 3.58 (L) 4.70 - 6.10 M/uL    Hemoglobin 11.9 (L) 14.0 - 18.0 g/dL    Hematocrit 34.1 (L) 42.0 - 52.0 %    MCV 95.3 81.4 - 97.8 fL    MCH 33.2 (H) 27.0 - 33.0 pg    MCHC 34.9 32.3 - 36.5 g/dL    RDW 47.3 35.9 - 50.0 fL    Platelet Count 175 164 - 446 K/uL    MPV 10.1 9.0 - 12.9 fL    Neutrophils-Polys 49.60 44.00 - 72.00 %    Lymphocytes 44.40 (H) 22.00 - 41.00 %    Monocytes 5.10 0.00 - 13.40 %    Eosinophils 0.00 0.00 - 6.90 %    Basophils 0.00 0.00 - 1.80 %    Nucleated RBC 0.00 0.00 - 0.20 /100 WBC    Neutrophils (Absolute) 8.38 (H) 1.82 - 7.42 K/uL    Lymphs (Absolute) 7.50 (H) 1.00 - 4.80 K/uL    Monos (Absolute) 0.86 (H) 0.00 - 0.85 K/uL    Eos (Absolute) 0.00 0.00 - 0.51 K/uL    Baso (Absolute) 0.00 0.00 - 0.12 K/uL    NRBC (Absolute) 0.00 K/uL    Anisocytosis 1+     Macrocytosis 1+    PROCALCITONIN    Collection Time: 04/29/24  2:49 AM   Result Value Ref Range    Procalcitonin 2.84 (H) <0.25 ng/mL   ESTIMATED GFR    Collection Time: 04/29/24  2:49 AM   Result Value Ref Range    GFR (CKD-EPI) 98 >60 mL/min/1.73 m 2   DIFFERENTIAL MANUAL    Collection Time: 04/29/24  2:49 AM   Result Value Ref Range    Metamyelocytes 0.90 %    Manual Diff Status PERFORMED     Comment See Comment    PERIPHERAL SMEAR REVIEW    Collection Time: 04/29/24  2:49 AM   Result Value Ref Range    Peripheral Smear Review see below    PLATELET ESTIMATE    Collection Time: 04/29/24  2:49 AM   Result Value Ref Range    Plt Estimation Normal    MORPHOLOGY    Collection Time: 04/29/24  2:49 AM   Result Value Ref Range    RBC Morphology Present     Smudge Cells Few    Basic Metabolic Panel    Collection Time: 04/29/24  3:03 PM   Result Value Ref Range    Sodium 136 135 - 145 mmol/L    Potassium 3.7 3.6 - 5.5 mmol/L    Chloride 103 96 - 112  mmol/L    Co2 19 (L) 20 - 33 mmol/L    Glucose 99 65 - 99 mg/dL    Bun 7 (L) 8 - 22 mg/dL    Creatinine 0.56 0.50 - 1.40 mg/dL    Calcium 7.4 (L) 8.5 - 10.5 mg/dL    Anion Gap 14.0 7.0 - 16.0   MAGNESIUM    Collection Time: 04/29/24  3:03 PM   Result Value Ref Range    Magnesium 2.3 1.5 - 2.5 mg/dL   PHOSPHORUS    Collection Time: 04/29/24  3:03 PM   Result Value Ref Range    Phosphorus 2.4 (L) 2.5 - 4.5 mg/dL   ESTIMATED GFR    Collection Time: 04/29/24  3:03 PM   Result Value Ref Range    GFR (CKD-EPI) 104 >60 mL/min/1.73 m 2       Imaging  Reviewed     Assessment/Plan    COPD exacerbation  No PFTs but does have some emphysema on CT chest from 4/2024. No wheezing, on room air.    - duonebs q4h PRN   - dulera/spiriva - recommend LABA/LAMA combo for outpatient for ease of use with one inhaler and better compliance initially, can also do Trelegy if possible w/insurance.  - will need smoking cessation counseling once mental status improves from his acute encephalopathy   - f/u with pulmonary outpatient   - ok to stop steroids since we don't want it to further worsen his current agitation and mental status changes and he's doing well clinically from respiratory stand point - if he goes into significant exacerbation, then can restart them    Pulmonary will sign off - please call with any questions/concerns.       Maddie Huang MD  Pulmonary and Critical Care Medicine  [facility]

## 2024-04-30 NOTE — THERAPY
"Occupational Therapy Contact Note    Patient Name: Ady Blake  Age:  74 y.o., Sex:  male  Medical Record #: [id number]  Today's Date: 4/30/2024 04/30/24 1501   Interdisciplinary Plan of Care Collaboration   Collaboration Comments Attempted to initiate OT Eval at 3p, pt initially agreeable and then when prompted to sit up refused stating he will \"get out of the bed when he damn well wants to\". Ed/trained pt on pathology of bedrest. Pt continued to refuse. Will attempt again as able.       "

## 2024-04-30 NOTE — DIETARY
Nutrition Services: Brief Update   Day 4 of admit.  Ady Blake is a 74 y.o. male with admitting DX of Acute respiratory failure with hypoxia (HCC) [J96.01]    RD visited pt at bedside to perform Nutrition-focused physical exam (NFPE). Pt sleeping soundly, even rise/fall of chest noted. Pt with hollowing of orbital region, bilateral temporal depressions, visible clavicle with some reduced muscle to deltoid region.    Malnutrition Risk: Agree w/ MD dx of malnutrition: Based on above NFPE, pt meets 2 ASPEN criteria for chronic moderate malnutrition suspected to be r/t multiple chronic disease states including alcoholism, COPD AEB pt presents w/ moderate fat wasting and moderate muscle wasting.  Labs: K+ 3.5, phos 1.8  MAR: completed: thiamine in D5, mag sulfate, Kdur, Kphos in D5    Recommendations/Plan:  Continue w/ current nutrition intervention in place: TID Ensure Plus w/ meals.   Based on dx of malnutrition and low K+, mag, and phos labs requiring replacement this admit, pt is at risk of refeeding syndrome. Recommend continuing 100 mg/d Thiamine to aid in glucose metabolism. Monitor electrolytes: order K+, mag, phos labs x 7 days. Communicated recommendations via Voalte message to MD; MD acknowledged. RD extended orders for K+, mag, glucose, and phos labs through 5/7.  Encourage intake of meals and supplements >50-75%.  Document intake of all meals as % taken in ADL's to provide interdisciplinary communication across all shifts.   Monitor weight.    RD following

## 2024-04-30 NOTE — CARE PLAN
The patient is Watcher - Medium risk of patient condition declining or worsening    Shift Goals  Clinical Goals: safety, prevent skin breakdown  Patient Goals: PREETI  Family Goals: PREETI    Progress made toward(s) clinical / shift goals:  Patient remaining safe with bilateral soft wrist restraints, skin intact, good circulation, range of motion implemented. [id number], barrier paste and sacral dressing on to prevent skin breakdown.       Problem: Knowledge Deficit - Standard  Goal: Patient and family/care givers will demonstrate understanding of plan of care, disease process/condition, diagnostic tests and medications  Outcome: Progressing     Problem: Nutrition - Advanced  Goal: Patient will display progressive weight gain toward goal have adequate food and fluid intake  Outcome: Progressing     Problem: Ineffective Airway Clearance  Goal: Patient will maintain patent airway with clear/clearing breath sounds  Outcome: Progressing     Problem: Impaired Gas Exchange  Goal: Patient will demonstrate improved ventilation and adequate oxygenation and participate in treatment regimen within the level of ability/situation.  Outcome: Progressing     Problem: Risk for Aspiration  Goal: Patient's risk for aspiration will be absent or decrease  Outcome: Progressing     Problem: Seizure Precautions  Goal: Implementation of seizure precautions  Outcome: Progressing     Problem: Skin Integrity  Goal: Skin integrity is maintained or improved  Outcome: Progressing     Problem: Fall Risk  Goal: Patient will remain free from falls  Outcome: Progressing     Problem: Safety - Medical Restraint  Goal: Remains free of injury from restraints (Restraint for Interference with Medical Device)  Outcome: Progressing     Problem: Pain - Standard  Goal: Alleviation of pain or a reduction in pain to the patient’s comfort goal  Outcome: Progressing

## 2024-04-30 NOTE — PROGRESS NOTES
Monitor Summary:    Rhythm: SR    HR: 62-90    Measurements:.14/.10/0.40    Ectopy: none                      Normal Values  Rhythm SR  HR Range    Measurements 0.12-0.20 / 0.06-0.10  / 0.30-0.52   44-year-old female with past medical history of kidney stones presents today complaining of left-sided flank pain since yesterday.  Patient describes the pain as sharp, radiating around the flank, and currently 10 out of 10.  Patient vomited multiple times.  Patient reports it feels like when she had a prior kidney stone.  Patient denies hematuria, dysuria, fever, chest pain, shortness of breath, diarrhea, or any other complaints.

## 2024-04-30 NOTE — CARE PLAN
The patient is Stable - Low risk of patient condition declining or worsening    Shift Goals  Clinical Goals: Electrolite replacement  Patient Goals: PRETEI  Family Goals: PREETI    Progress made toward(s) clinical / shift goals:  Pt in soft bi lateral wrist restrains for safety. Pt confused and hallucinating.  Pt sleepy through the day per family pt sleeps during the day and is active at night. Fall precautions maintained.

## 2024-05-01 LAB
ALBUMIN SERPL BCP-MCNC: 2.9 G/DL (ref 3.2–4.9)
ALBUMIN/GLOB SERPL: 1.3 G/DL
ALP SERPL-CCNC: 72 U/L (ref 30–99)
ALT SERPL-CCNC: 354 U/L (ref 2–50)
ANION GAP SERPL CALC-SCNC: 12 MMOL/L (ref 7–16)
AST SERPL-CCNC: 45 U/L (ref 12–45)
BACTERIA BLD CULT: NORMAL
BACTERIA BLD CULT: NORMAL
BASOPHILS # BLD AUTO: 0 % (ref 0–1.8)
BASOPHILS # BLD: 0 K/UL (ref 0–0.12)
BILIRUB SERPL-MCNC: 1 MG/DL (ref 0.1–1.5)
BUN SERPL-MCNC: 9 MG/DL (ref 8–22)
CALCIUM ALBUM COR SERPL-MCNC: 8.8 MG/DL (ref 8.5–10.5)
CALCIUM SERPL-MCNC: 7.9 MG/DL (ref 8.5–10.5)
CHLORIDE SERPL-SCNC: 106 MMOL/L (ref 96–112)
CO2 SERPL-SCNC: 17 MMOL/L (ref 20–33)
COMMENT NL1176: NORMAL
CREAT SERPL-MCNC: 0.51 MG/DL (ref 0.5–1.4)
EOSINOPHIL # BLD AUTO: 0 K/UL (ref 0–0.51)
EOSINOPHIL NFR BLD: 0 % (ref 0–6.9)
ERYTHROCYTE [DISTWIDTH] IN BLOOD BY AUTOMATED COUNT: 49.4 FL (ref 35.9–50)
GFR SERPLBLD CREATININE-BSD FMLA CKD-EPI: 107 ML/MIN/1.73 M 2
GLOBULIN SER CALC-MCNC: 2.2 G/DL (ref 1.9–3.5)
GLUCOSE SERPL-MCNC: 97 MG/DL (ref 65–99)
HCT VFR BLD AUTO: 31.7 % (ref 42–52)
HGB BLD-MCNC: 10.9 G/DL (ref 14–18)
LYMPHOCYTES # BLD AUTO: 7.88 K/UL (ref 1–4.8)
LYMPHOCYTES NFR BLD: 39 % (ref 22–41)
MAGNESIUM SERPL-MCNC: 1.4 MG/DL (ref 1.5–2.5)
MANUAL DIFF BLD: NORMAL
MCH RBC QN AUTO: 32.9 PG (ref 27–33)
MCHC RBC AUTO-ENTMCNC: 34.4 G/DL (ref 32.3–36.5)
MCV RBC AUTO: 95.8 FL (ref 81.4–97.8)
MONOCYTES # BLD AUTO: 0.34 K/UL (ref 0–0.85)
MONOCYTES NFR BLD AUTO: 1.7 % (ref 0–13.4)
MORPHOLOGY BLD-IMP: NORMAL
NEUTROPHILS # BLD AUTO: 11.98 K/UL (ref 1.82–7.42)
NEUTROPHILS NFR BLD: 59.3 % (ref 44–72)
NRBC # BLD AUTO: 0 K/UL
NRBC BLD-RTO: 0 /100 WBC (ref 0–0.2)
PHOSPHATE SERPL-MCNC: 1.9 MG/DL (ref 2.5–4.5)
PLATELET # BLD AUTO: 191 K/UL (ref 164–446)
PLATELET BLD QL SMEAR: NORMAL
PMV BLD AUTO: 10.1 FL (ref 9–12.9)
POTASSIUM SERPL-SCNC: 3.4 MMOL/L (ref 3.6–5.5)
PROT SERPL-MCNC: 5.1 G/DL (ref 6–8.2)
RBC # BLD AUTO: 3.31 M/UL (ref 4.7–6.1)
RBC BLD AUTO: PRESENT
SIGNIFICANT IND 70042: NORMAL
SIGNIFICANT IND 70042: NORMAL
SITE SITE: NORMAL
SITE SITE: NORMAL
SMUDGE CELLS BLD QL SMEAR: NORMAL
SODIUM SERPL-SCNC: 135 MMOL/L (ref 135–145)
SOURCE SOURCE: NORMAL
SOURCE SOURCE: NORMAL
WBC # BLD AUTO: 20.2 K/UL (ref 4.8–10.8)

## 2024-05-01 PROCEDURE — 700101 HCHG RX REV CODE 250: Performed by: INTERNAL MEDICINE

## 2024-05-01 PROCEDURE — 36415 COLL VENOUS BLD VENIPUNCTURE: CPT

## 2024-05-01 PROCEDURE — 700102 HCHG RX REV CODE 250 W/ 637 OVERRIDE(OP): Performed by: INTERNAL MEDICINE

## 2024-05-01 PROCEDURE — 84100 ASSAY OF PHOSPHORUS: CPT

## 2024-05-01 PROCEDURE — 99233 SBSQ HOSP IP/OBS HIGH 50: CPT | Performed by: INTERNAL MEDICINE

## 2024-05-01 PROCEDURE — 700105 HCHG RX REV CODE 258: Performed by: INTERNAL MEDICINE

## 2024-05-01 PROCEDURE — 700111 HCHG RX REV CODE 636 W/ 250 OVERRIDE (IP): Mod: JZ | Performed by: INTERNAL MEDICINE

## 2024-05-01 PROCEDURE — 85007 BL SMEAR W/DIFF WBC COUNT: CPT

## 2024-05-01 PROCEDURE — A9270 NON-COVERED ITEM OR SERVICE: HCPCS | Performed by: INTERNAL MEDICINE

## 2024-05-01 PROCEDURE — 770020 HCHG ROOM/CARE - TELE (206)

## 2024-05-01 PROCEDURE — 80053 COMPREHEN METABOLIC PANEL: CPT

## 2024-05-01 PROCEDURE — 83735 ASSAY OF MAGNESIUM: CPT

## 2024-05-01 PROCEDURE — 97535 SELF CARE MNGMENT TRAINING: CPT

## 2024-05-01 PROCEDURE — 92526 ORAL FUNCTION THERAPY: CPT

## 2024-05-01 PROCEDURE — 85027 COMPLETE CBC AUTOMATED: CPT

## 2024-05-01 RX ORDER — POTASSIUM CHLORIDE 20 MEQ/1
40 TABLET, EXTENDED RELEASE ORAL 2 TIMES DAILY
Status: COMPLETED | OUTPATIENT
Start: 2024-05-01 | End: 2024-05-02

## 2024-05-01 RX ORDER — MAGNESIUM SULFATE HEPTAHYDRATE 40 MG/ML
2 INJECTION, SOLUTION INTRAVENOUS ONCE
Status: COMPLETED | OUTPATIENT
Start: 2024-05-01 | End: 2024-05-01

## 2024-05-01 RX ADMIN — AMOXICILLIN AND CLAVULANATE POTASSIUM 1 TABLET: 875; 125 TABLET, FILM COATED ORAL at 18:18

## 2024-05-01 RX ADMIN — LIDOCAINE 1 PATCH: 4 PATCH TOPICAL at 11:34

## 2024-05-01 RX ADMIN — Medication 100 MG: at 18:18

## 2024-05-01 RX ADMIN — ENOXAPARIN SODIUM 40 MG: 100 INJECTION SUBCUTANEOUS at 18:18

## 2024-05-01 RX ADMIN — DIBASIC SODIUM PHOSPHATE, MONOBASIC POTASSIUM PHOSPHATE AND MONOBASIC SODIUM PHOSPHATE 250 MG: 852; 155; 130 TABLET ORAL at 00:29

## 2024-05-01 RX ADMIN — POTASSIUM CHLORIDE 40 MEQ: 1500 TABLET, EXTENDED RELEASE ORAL at 18:18

## 2024-05-01 RX ADMIN — SODIUM CHLORIDE: 9 INJECTION, SOLUTION INTRAVENOUS at 00:42

## 2024-05-01 RX ADMIN — DIBASIC SODIUM PHOSPHATE, MONOBASIC POTASSIUM PHOSPHATE AND MONOBASIC SODIUM PHOSPHATE 250 MG: 852; 155; 130 TABLET ORAL at 11:34

## 2024-05-01 RX ADMIN — POTASSIUM CHLORIDE 40 MEQ: 1500 TABLET, EXTENDED RELEASE ORAL at 09:41

## 2024-05-01 RX ADMIN — TIOTROPIUM BROMIDE INHALATION SPRAY 5 MCG: 3.12 SPRAY, METERED RESPIRATORY (INHALATION) at 04:54

## 2024-05-01 RX ADMIN — MOMETASONE FUROATE AND FORMOTEROL FUMARATE DIHYDRATE 2 PUFF: 100; 5 AEROSOL RESPIRATORY (INHALATION) at 04:54

## 2024-05-01 RX ADMIN — DIBASIC SODIUM PHOSPHATE, MONOBASIC POTASSIUM PHOSPHATE AND MONOBASIC SODIUM PHOSPHATE 250 MG: 852; 155; 130 TABLET ORAL at 04:54

## 2024-05-01 RX ADMIN — AMOXICILLIN AND CLAVULANATE POTASSIUM 1 TABLET: 875; 125 TABLET, FILM COATED ORAL at 04:54

## 2024-05-01 RX ADMIN — DIBASIC SODIUM PHOSPHATE, MONOBASIC POTASSIUM PHOSPHATE AND MONOBASIC SODIUM PHOSPHATE 250 MG: 852; 155; 130 TABLET ORAL at 18:19

## 2024-05-01 RX ADMIN — MOMETASONE FUROATE AND FORMOTEROL FUMARATE DIHYDRATE 2 PUFF: 100; 5 AEROSOL RESPIRATORY (INHALATION) at 18:18

## 2024-05-01 RX ADMIN — AMLODIPINE BESYLATE 5 MG: 5 TABLET ORAL at 04:55

## 2024-05-01 RX ADMIN — MAGNESIUM SULFATE HEPTAHYDRATE 2 G: 2 INJECTION, SOLUTION INTRAVENOUS at 09:46

## 2024-05-01 RX ADMIN — OMEPRAZOLE 20 MG: 20 CAPSULE, DELAYED RELEASE ORAL at 04:54

## 2024-05-01 RX ADMIN — QUETIAPINE FUMARATE 12.5 MG: 25 TABLET ORAL at 20:36

## 2024-05-01 ASSESSMENT — ENCOUNTER SYMPTOMS
ABDOMINAL PAIN: 0
ORTHOPNEA: 0
COUGH: 0
NAUSEA: 0
SHORTNESS OF BREATH: 0
FEVER: 0
BACK PAIN: 0
NECK PAIN: 0
DIZZINESS: 0
HEMOPTYSIS: 0
PALPITATIONS: 0
VOMITING: 0
CHILLS: 0
MYALGIAS: 0

## 2024-05-01 ASSESSMENT — PAIN DESCRIPTION - PAIN TYPE: TYPE: ACUTE PAIN

## 2024-05-01 NOTE — PROGRESS NOTES
Handoff report received from night shift nurse. Pt care assumed. Pt is currently resting in bed. POC discussed with Pt and Pt verbalizes no questions at this time. Pt is AAOx3- disoriented to situation, on room air, on Tele monitoring, and VSS. Call light and belongings within reach, bed in lowest and locked position, and Pt educated on use of call light.

## 2024-05-01 NOTE — DOCUMENTATION QUERY
[facility]                                                                       Query Response Note      PATIENT:               LINDSAY HILLS  ACCT #:                  [id number]  MRN:                     [id number]  :                      1950  ADMIT DATE:       2024 11:14 AM  DISCH DATE:          RESPONDING  PROVIDER #:        [id number]           QUERY TEXT:    RD assessment from  states patient meets 2 ASPEN criteria for chronic moderate malnutrition as evidenced by moderate fat wasting and moderate muscle wasting.     Please confirm the severity of protein-calorie malnutrition.    The patient's Clinical Indicators include:  H&P: Malnutrition - Cachectic appearance    RD :  - Agree w/ MD dx of malnutrition. Based on above NFPE, pt meets 2 ASPEN criteria for chronic moderate malnutrition suspected to be r/t multiple chronic disease states including alcoholism, COPD AEB pt presents w/ moderate fat wasting and moderate muscle wasting.  - NFPE: Pt with hollowing of orbital region, bilateral temporal depressions, visible clavicle with some reduced muscle to deltoid region.    Risk Factors: Chronic conditions (alcohol abuse, COPD)  Treatment: RD consult, Ensure Plus TID, electrolyte replacements, thiamine 100mg QD, encouraged intake of meals & supplements, weight and PO intake monitoring      Thank you for your time and attention,  OSMAN Hall RN  Available via Voalte  Options provided:   -- Mild   -- Moderate   -- Findings of no clinical significance   -- Other explanation, (please specify other explanation)   -- Unable to determine      Query created by: Cecilia Luevano on 2024 11:17 AM    RESPONSE TEXT:    Mild          Electronically signed by:  DUNG BROTHERS MD 2024 1:10 PM

## 2024-05-01 NOTE — RESPIRATORY CARE
COPD EDUCATION by COPD CLINICAL EDUCATOR  5/1/2024 at 4:10 PM by Sho Bradley, RRT     Patient interviewed by COPD education team. Patient is hard of hearing and still having behavioral issues, refused COPD program at this time. Our team will attempt to revisit at another time.

## 2024-05-01 NOTE — PROGRESS NOTES
Monitor Summary:    Rhythm: SR    HR: 65-73    Measurements:0.16/0.10/0.42    Ectopy: rPAC rPVc              Normal Values  Rhythm SR  HR Range    Measurements 0.12-0.20 / 0.06-0.10  / 0.30-0.52

## 2024-05-01 NOTE — THERAPY
"   05/01/24 9540   Interdisciplinary Plan of Care Collaboration   Collaboration Comments OT eval attempted. Pt awake at time of attempt, but stating \"I am sleeping.\" Pt adamentaly declined eval. Pt educated regarding the role of OT and the pathology of bedrest. Will continue to follow and will attempt again as appropriate.       "

## 2024-05-01 NOTE — CARE PLAN
The patient is Stable - Low risk of patient condition declining or worsening    Shift Goals  Clinical Goals: Monitor labs  Patient Goals: Rest  Family Goals: PREETI    Progress made toward(s) clinical / shift goals:   Problem: Knowledge Deficit - Standard  Goal: Patient and family/care givers will demonstrate understanding of plan of care, disease process/condition, diagnostic tests and medications  Outcome: Progressing     Problem: Self Care  Goal: Patient will have the ability to perform ADLs independently or with assistance (bathe, groom, dress, toilet and feed)  Outcome: Progressing     Problem: Fall Risk  Goal: Patient will remain free from falls  Outcome: Progressing

## 2024-05-01 NOTE — THERAPY
"Speech Language Pathology   Daily Treatment     Patient Name: Ady Blake  AGE:  74 y.o., SEX:  male  Medical Record #: [id number]  Date of Service: 5/1/2024      Precautions:  Precautions: Fall Risk, Swallow Precautions      Subjective  RN notified this SLP that patient appears to be tolerating diet of SB6/TN0 diet without difficulty and mentation has improved. Patient received awake, alert, and in bed, upright. Flat affect, but cooperative with dysphagia tx and objectives.     Assessment  Patient had upper and lower dentures in place. Patient consumed PO trials of soft solids, mixed consistencies, dry solids, and thin liquids via straw. Patient self-delivered all PO trials with appropriate bite/sip size and rate. Bolus acceptance and containment was adequate. Mastication was timely and complete with oral clearance achieved. No s/sx of aspiration on any textures trialed. Patient reported he \"chews fine\" despite full set of dentures and reported eagerness for diet upgrade.     Clinical Impressions  Patient presents with suspected improvement in oropharyngeal swallow function and mentation compared to last session with SLP. Patient appears appropriate to upgrade to regular diet w/ thin liquids w/ distant supervision to ensure f/t of swallow precautions. Pills ok as tolerated. SLP is following likely 1-2x to ensure diet tolerance.     Recommendations  Treatment Completed: Dysphagia Treatment     Dysphagia Treatment  Diet Consistency: Regular/thins  Instrumentation: None indicated at this time  Medication: Whole with liquid, As tolerated  Supervision: Distant supervision - check on patient 2-3 times per meal, Encourage self-feeding  Positioning: Fully upright and midline during oral intake, Meals sitting upright in a chair, as tolerated  Risk Management : Small bites/sips, Slow rate of intake, Reduce environmental distractions, Physical mobility, as tolerated  Oral Care: Q8h    SLP Treatment Plan  Treatment " Plan: None Indicated  SLP Frequency: 3x Per Week  Estimated Duration: Until Therapy Goals Met    Anticipated Discharge Needs  Discharge Recommendations: Anticipate that the patient will have no further speech therapy needs after discharge from the hospital  Therapy Recommendations Upon DC: Not Indicated     Short Term Goals  Short Term Goal # 1 B : Patient will consume a SB/TN diet with no overt s/sx of aspiration.  Goal Outcome  # 1 B: Goal met  Short Term Goal # 2: NEW 5/1: Patient will consume regular diet w/ thin liquids with distant supervision with no overt s/sx of aspiration or decline in respiratory status.    Erinn Boyce, SLP

## 2024-05-01 NOTE — CARE PLAN
The patient is Watcher - Medium risk of patient condition declining or worsening    Shift Goals  Clinical Goals: Safety, monitor labs  Patient Goals: PREETI  Family Goals: PREETI    Progress made toward(s) clinical / shift goals:  Pt wrist restrains discontinued in the morning. Pt on room air. Ax3 in the end of the shift.

## 2024-05-01 NOTE — CARE PLAN
The patient is Stable - Low risk of patient condition declining or worsening    Shift Goals  Clinical Goals: monitor labs & vitals  Patient Goals: rest  Family Goals: PREETI    Progress made toward(s) clinical / shift goals:    Problem: Impaired Gas Exchange  Goal: Patient will demonstrate improved ventilation and adequate oxygenation and participate in treatment regimen within the level of ability/situation.  Outcome: Progressing     Problem: Pain - Standard  Goal: Alleviation of pain or a reduction in pain to the patient’s comfort goal  Outcome: Progressing    Patient's vitals and labs have remained stable, no oxygen required and maintaining >95% O2 sat. Assesed pt's pain level which was 0/10.     Patient is not progressing towards the following goals:

## 2024-05-01 NOTE — PROGRESS NOTES
Hospital Medicine Daily Progress Note    Date of Service  5/1/2024    Chief Complaint  Ady Blake is a 74 y.o. male admitted 4/26/2024 with fall    Hospital Course:    74-year-old male with history of smoking, alcoholism and hypertension who presented 4/26 with fall. Patient states he is drinking beer every day and smoking half a pack. Patient tripped when he was walking and using cane and fell on his left side, denied any dizziness or lightheadedness, denied any loss of consciousness. Patient lives by himself and usually does all daily activities by himself. Patient was noticed to have cachectic appearance and bruises on his body. Vital signs were stable and patient was on room air. Chest x-ray showed signs of COPD however no signs of pneumonia. CT lumbar, thoracic showed acute compression fracture of T10, CT abdomen showed rib fracture on the left side, seventh, eighth and ninth with diffuse colitis and severe fatty liver disease. Labs showed leukocytosis 17 with elevated liver enzymes ALT 3460 and AST 1279 with total bilirubin 2.1 also patient was found to have bicarb 14 with elevated anion gap and potassium 3.5, IV fluid with CIWA protocol was initiated.  CT head was done and did not show an acute finding.  Patient was very lethargic, CIWA protocol was discontinued to avoid sedation.      Interval Problem Update  -Evaluated examined the patient at bedside, patient is more alert however still confused and having hallucination, continue Ativan as needed and scheduled Seroquel at night.  -Replace electrolytes  -CT head did not show any acute finding, no neurofocal deficit, will hold on MRI for brain at this time.  -Leukocytosis likely related to prednisone  -Discussed with the speech, no dysphagia or risk of aspiration.  - improving on his liver enzymes, holding on MRCP at this time.  -Patient has complicated medical problems and high suspicious for deterioration, consider ICU transfer if needed.  4/30:  Patient seen and examined afebrile, still confused, finished steroid course, today, wbc up to 20 possible related to steroid use   Hypokalemia: potassium of 3.5 repleting   5/1: Patient seen and examined, afebrile MRI done and reviewed no acute issues   Mentation is improving  Hypokalemia: potassium of 3.4 repleting  Hypomagnesemia: repleting   I have discussed this patient's plan of care and discharge plan at IDT rounds today with Case Management, Nursing, Nursing leadership, and other members of the IDT team.    Consultants/Specialty  None   Code Status  Full Code    Disposition  The patient is not medically cleared for discharge to home or a post-acute facility.      I have placed the appropriate orders for post-discharge needs.    Review of Systems  Review of Systems   Constitutional:  Negative for chills and fever.   Respiratory:  Negative for cough, hemoptysis and shortness of breath.    Cardiovascular:  Negative for chest pain, palpitations and orthopnea.   Gastrointestinal:  Negative for abdominal pain, nausea and vomiting.   Genitourinary:  Negative for dysuria, frequency and urgency.   Musculoskeletal:  Negative for back pain, myalgias and neck pain.   Neurological:  Negative for dizziness.   All other systems reviewed and are negative.       Physical Exam  Temp:  [36.4 °C (97.5 °F)-36.9 °C (98.4 °F)] 36.4 °C (97.5 °F)  Pulse:  [65-82] 65  Resp:  [16-18] 18  BP: (120-138)/(64-79) 122/68  SpO2:  [95 %-96 %] 95 %    Physical Exam  Constitutional:       General: He is in acute distress.      Appearance: He is ill-appearing.      Comments: Cachectic appearance   Eyes:      General: No scleral icterus.  Cardiovascular:      Rate and Rhythm: Normal rate.      Heart sounds: No murmur heard.  Pulmonary:      Effort: No respiratory distress.      Breath sounds: No wheezing.   Abdominal:      General: There is no distension.      Tenderness: There is no abdominal tenderness. There is no right CVA tenderness, left  CVA tenderness or guarding.   Musculoskeletal:      Right lower leg: No edema.      Left lower leg: No edema.   Lymphadenopathy:      Cervical: No cervical adenopathy.   Skin:     Coloration: Skin is not jaundiced.      Findings: Bruising and lesion present. No rash.   Neurological:      General: No focal deficit present.      Mental Status: He is alert. Mental status is at baseline. He is disoriented.      Cranial Nerves: No cranial nerve deficit.      Motor: No weakness.      Gait: Gait normal.         Fluids    Intake/Output Summary (Last 24 hours) at 5/1/2024 1429  Last data filed at 5/1/2024 0233  Gross per 24 hour   Intake 240 ml   Output 500 ml   Net -260 ml       Laboratory  Recent Labs     04/29/24  0249 04/30/24  0415 05/01/24  0442   WBC 16.9* 20.0* 20.2*   RBC 3.58* 3.69* 3.31*   HEMOGLOBIN 11.9* 12.2* 10.9*   HEMATOCRIT 34.1* 35.1* 31.7*   MCV 95.3 95.1 95.8   MCH 33.2* 33.1* 32.9   MCHC 34.9 34.8 34.4   RDW 47.3 47.8 49.4   PLATELETCT 175 179 191   MPV 10.1 10.1 10.1     Recent Labs     04/29/24  1503 04/30/24  0218 05/01/24  0442   SODIUM 136 137 135   POTASSIUM 3.7 3.5* 3.4*   CHLORIDE 103 106 106   CO2 19* 18* 17*   GLUCOSE 99 105* 97   BUN 7* 7* 9   CREATININE 0.56 0.48* 0.51   CALCIUM 7.4* 7.7* 7.9*                     Imaging  MR-BRAIN-W/O   Final Result      1.  No acute abnormality.   2.  Moderate cerebral volume loss.      DX-CHEST-LIMITED (1 VIEW)   Final Result         1.  No acute cardiopulmonary disease.   2.  Atherosclerosis      CT-HEAD W/O   Final Result      There is no definite acute intracranial abnormality.         EC-ECHOCARDIOGRAM COMPLETE W/O CONT   Final Result      DX-CHEST-LIMITED (1 VIEW)   Final Result      No acute process.      US-RUQ   Final Result         1.  Dilatation the right renal pelvis, appearance most compatible with mild hydronephrosis.   2.  Echogenic liver, compatible with fatty change versus fibrosis.   3.  Atherosclerosis      CT-LSPINE W/O PLUS RECONS    Final Result      Mild multilevel degenerative disease without definite fracture.      CT-TSPINE W/O PLUS RECONS   Final Result      1.  Acute superior endplate compression fracture of the T10 vertebral body with moderate height loss.      CT-CHEST,ABDOMEN,PELVIS WITH   Final Result      1.  Acute left lateral seventh, eighth, ninth rib fractures.   2.  Moderate superior endplate compression fracture of the T10 vertebral body which appears acute with fracture lines visible.   3.  Ileum enteritis.   4.  Diffuse colitis.   5.  Severe fatty liver infiltration.      DX-CHEST-PORTABLE (1 VIEW)   Final Result      No acute cardiac or pulmonary abnormalities are identified.           Assessment/Plan  * Acute respiratory failure with hypoxia (HCC)- (present on admission)  Assessment & Plan  Patient is on room air  Likely related to COPD  Inhalers    Metabolic encephalopathy  Assessment & Plan  Patient has delirium, multi factors  Likely related to alcoholism, infection, dehydration and hospitalization  CT head did not show any acute finding  Nonpharmacological treatment  Schedule Seroquel at night  Avoid aggressive sedation and avoid antihistamine  Consider EEG or MRI if there is no improving    Rib fracture  Assessment & Plan  Due to falls  No pneumothorax  Pain control  Respiratory hygiene    Debility  Assessment & Plan  Patient has significant generalized weakness with debility and recurrent falls  PT and OT evaluation  TSH is normal, would likely patient needs placement    Closed fracture of tenth thoracic vertebra (HCC)  Assessment & Plan  From fall  Case was discussed with Dr. Xie neurosurgeon, no intervention needed, brace  Pain control and PT and OT and placement    ACP (advance care planning)  Assessment & Plan  4/26, patient is alert and oriented x 4, discussed the plan of care with the patient, answered all his questions, discussed the CODE STATUS with the patient, patient wants to be full code.  Time 15  minutes    Leukocytosis  Assessment & Plan  Likely related to stress  No signs of infection or fever, holding on antibiotics at this time    Metabolic acidosis  Assessment & Plan  Likely related to alcoholic, starvation and fall with kidney failure  IV fluids gently, improving on his labs  Infection workup possible aspiration pneumonia:     AVA (acute kidney injury) ([facility])  Assessment & Plan  With creatinine 1.6  Likely related to dehydration  IV fluid and labs daily bladder scan to rule out retention  CPK is normal  Resolved    Elevated liver enzymes  Assessment & Plan  Severely elevated enzymes around thousands   Labs daily  Close monitoring and consider steroid if needed  Ultrasound did not show any acute finding except fatty liver  Improving likely related to alcoholic hepatitis  Holding on MRCP at this time.    Malnutrition (HCC)  Assessment & Plan  Cachectic appearance  Nutrition consult  Encourage the patient to quit drinking alcohol and smoking    Fall  Assessment & Plan  Patient has cachectic appearance, multiple bruises on his body  Patient using cane at home, lives by himself  PT and OT  TSH is normal and vitamin D is pending    Tobacco dependence- (present on admission)  Assessment & Plan  Smoking cessation  Nicotine patch    COPD (chronic obstructive pulmonary disease) ([facility])- (present on admission)  Assessment & Plan  No significant exacerbation  Chest x-ray did not show any signs of infiltrate or or pneumonia  Inhalers,  Pulmonary referral    Alcohol abuse- (present on admission)  Assessment & Plan  Heavy drinking for years  [facility] protocol  High-dose of thiamine and multivitamins  Encouraged the patient to quit    HTN (hypertension)- (present on admission)  Assessment & Plan  Holding blood pressure   Avoid aggressive treatment         Greater than 51 minutes spent prepping to see patient (e.g. review of tests) obtaining and/or reviewing separately obtained history. Performing a medically appropriate  examination and/ evaluation.  Counseling and educating the patient/family/caregiver.  Ordering medications, tests, or procedures.  Referring and communicating with other health care professionals.  Documenting clinical information in EPIC.  Independently interpreting results and communicating results to patient/family/caregiver.  Care coordination.     VTE prophylaxis: Lovenox    I have performed a physical exam and reviewed and updated ROS and Plan today (5/1/2024). In review of yesterday's note (4/30/2024), there are no changes except as documented above.

## 2024-05-01 NOTE — THERAPY
"Physical Therapy Contact Note    Patient Name: Ady Blake  Age:  74 y.o., Sex:  male  Medical Record #: [id number]  Today's Date: 5/1/2024    Discussed missed therapy with RN. Pt is refusing PT today, also refused OT yesterday. Today, pt reports \"I'm fine right here\" when role of therapy in acute setting is explained. Pt reports that when home, he is independent with his daily routine, using SPC sometimes, but \"that's how I ended up here\", saying that he tripped over his cane. Education done re benefit of OOB to maintain his endurance. Pt refuses offer of assist up to chair, stating no reason to get up since he has a catheter. Update given to nsg who will attempt to get pt up later. PT to follow when pt is agreeable to participate.        05/01/24 3796   Prior Living Situation   Housing / Facility [address] [facility]   Steps Into Home 0  (pt reports no stairs, chart indicates 1 step to enter)   Steps In Home 0   Equipment Owned Single Point Cane   Lives with - Patient's Self Care Capacity Alone and Unable to Care For Self   Comments daughter and DIL assist per nsg and chart.   Prior Level of Functional Mobility   Bed Mobility Independent   Transfer Status Independent   Ambulation Independent   Ambulation Distance community  (pt reports driving himself to grocery store)   Assistive Devices Used Single Point Cane   History of Falls   History of Falls Yes   Date of Last Fall   (multiple per chart)       "

## 2024-05-02 LAB
ALBUMIN SERPL BCP-MCNC: 3.2 G/DL (ref 3.2–4.9)
ALBUMIN/GLOB SERPL: 1.5 G/DL
ALP SERPL-CCNC: 81 U/L (ref 30–99)
ALT SERPL-CCNC: 284 U/L (ref 2–50)
ANION GAP SERPL CALC-SCNC: 11 MMOL/L (ref 7–16)
AST SERPL-CCNC: 55 U/L (ref 12–45)
BASOPHILS # BLD AUTO: 0 % (ref 0–1.8)
BASOPHILS # BLD: 0 K/UL (ref 0–0.12)
BILIRUB SERPL-MCNC: 0.8 MG/DL (ref 0.1–1.5)
BUN SERPL-MCNC: 9 MG/DL (ref 8–22)
CALCIUM ALBUM COR SERPL-MCNC: 8.7 MG/DL (ref 8.5–10.5)
CALCIUM SERPL-MCNC: 8.1 MG/DL (ref 8.5–10.5)
CHLORIDE SERPL-SCNC: 108 MMOL/L (ref 96–112)
CO2 SERPL-SCNC: 18 MMOL/L (ref 20–33)
COMMENT NL1176: NORMAL
CREAT SERPL-MCNC: 0.66 MG/DL (ref 0.5–1.4)
EOSINOPHIL # BLD AUTO: 0 K/UL (ref 0–0.51)
EOSINOPHIL NFR BLD: 0 % (ref 0–6.9)
ERYTHROCYTE [DISTWIDTH] IN BLOOD BY AUTOMATED COUNT: 51.2 FL (ref 35.9–50)
GFR SERPLBLD CREATININE-BSD FMLA CKD-EPI: 99 ML/MIN/1.73 M 2
GLOBULIN SER CALC-MCNC: 2.2 G/DL (ref 1.9–3.5)
GLUCOSE SERPL-MCNC: 117 MG/DL (ref 65–99)
HCT VFR BLD AUTO: 32.7 % (ref 42–52)
HGB BLD-MCNC: 11.3 G/DL (ref 14–18)
LYMPHOCYTES # BLD AUTO: 7.17 K/UL (ref 1–4.8)
LYMPHOCYTES NFR BLD: 44 % (ref 22–41)
MAGNESIUM SERPL-MCNC: 1.5 MG/DL (ref 1.5–2.5)
MANUAL DIFF BLD: NORMAL
MCH RBC QN AUTO: 33.6 PG (ref 27–33)
MCHC RBC AUTO-ENTMCNC: 34.6 G/DL (ref 32.3–36.5)
MCV RBC AUTO: 97.3 FL (ref 81.4–97.8)
MONOCYTES # BLD AUTO: 0.28 K/UL (ref 0–0.85)
MONOCYTES NFR BLD AUTO: 1.7 % (ref 0–13.4)
MORPHOLOGY BLD-IMP: NORMAL
NEUTROPHILS # BLD AUTO: 8.85 K/UL (ref 1.82–7.42)
NEUTROPHILS NFR BLD: 54.3 % (ref 44–72)
NRBC # BLD AUTO: 0 K/UL
NRBC BLD-RTO: 0 /100 WBC (ref 0–0.2)
PHOSPHATE SERPL-MCNC: 2 MG/DL (ref 2.5–4.5)
PLATELET # BLD AUTO: 208 K/UL (ref 164–446)
PLATELET BLD QL SMEAR: NORMAL
PMV BLD AUTO: 10.4 FL (ref 9–12.9)
POTASSIUM SERPL-SCNC: 4 MMOL/L (ref 3.6–5.5)
PROT SERPL-MCNC: 5.4 G/DL (ref 6–8.2)
RBC # BLD AUTO: 3.36 M/UL (ref 4.7–6.1)
RBC BLD AUTO: PRESENT
SMUDGE CELLS BLD QL SMEAR: NORMAL
SODIUM SERPL-SCNC: 137 MMOL/L (ref 135–145)
WBC # BLD AUTO: 16.3 K/UL (ref 4.8–10.8)

## 2024-05-02 PROCEDURE — 99232 SBSQ HOSP IP/OBS MODERATE 35: CPT | Performed by: INTERNAL MEDICINE

## 2024-05-02 PROCEDURE — A9270 NON-COVERED ITEM OR SERVICE: HCPCS | Performed by: INTERNAL MEDICINE

## 2024-05-02 PROCEDURE — 97535 SELF CARE MNGMENT TRAINING: CPT

## 2024-05-02 PROCEDURE — 94664 DEMO&/EVAL PT USE INHALER: CPT

## 2024-05-02 PROCEDURE — 83735 ASSAY OF MAGNESIUM: CPT

## 2024-05-02 PROCEDURE — 700102 HCHG RX REV CODE 250 W/ 637 OVERRIDE(OP): Performed by: INTERNAL MEDICINE

## 2024-05-02 PROCEDURE — 92526 ORAL FUNCTION THERAPY: CPT

## 2024-05-02 PROCEDURE — 85007 BL SMEAR W/DIFF WBC COUNT: CPT

## 2024-05-02 PROCEDURE — 36415 COLL VENOUS BLD VENIPUNCTURE: CPT

## 2024-05-02 PROCEDURE — 84100 ASSAY OF PHOSPHORUS: CPT

## 2024-05-02 PROCEDURE — 770001 HCHG ROOM/CARE - MED/SURG/GYN PRIV*

## 2024-05-02 PROCEDURE — 80053 COMPREHEN METABOLIC PANEL: CPT

## 2024-05-02 PROCEDURE — 85027 COMPLETE CBC AUTOMATED: CPT

## 2024-05-02 RX ADMIN — POTASSIUM CHLORIDE 40 MEQ: 1500 TABLET, EXTENDED RELEASE ORAL at 17:12

## 2024-05-02 RX ADMIN — MOMETASONE FUROATE AND FORMOTEROL FUMARATE DIHYDRATE 2 PUFF: 100; 5 AEROSOL RESPIRATORY (INHALATION) at 17:12

## 2024-05-02 RX ADMIN — DIBASIC SODIUM PHOSPHATE, MONOBASIC POTASSIUM PHOSPHATE AND MONOBASIC SODIUM PHOSPHATE 250 MG: 852; 155; 130 TABLET ORAL at 17:12

## 2024-05-02 RX ADMIN — Medication 100 MG: at 17:12

## 2024-05-02 RX ADMIN — DIBASIC SODIUM PHOSPHATE, MONOBASIC POTASSIUM PHOSPHATE AND MONOBASIC SODIUM PHOSPHATE 250 MG: 852; 155; 130 TABLET ORAL at 12:46

## 2024-05-02 RX ADMIN — POTASSIUM CHLORIDE 40 MEQ: 1500 TABLET, EXTENDED RELEASE ORAL at 04:50

## 2024-05-02 RX ADMIN — OMEPRAZOLE 20 MG: 20 CAPSULE, DELAYED RELEASE ORAL at 04:50

## 2024-05-02 RX ADMIN — DIBASIC SODIUM PHOSPHATE, MONOBASIC POTASSIUM PHOSPHATE AND MONOBASIC SODIUM PHOSPHATE 250 MG: 852; 155; 130 TABLET ORAL at 04:51

## 2024-05-02 RX ADMIN — AMOXICILLIN AND CLAVULANATE POTASSIUM 1 TABLET: 875; 125 TABLET, FILM COATED ORAL at 04:51

## 2024-05-02 RX ADMIN — AMLODIPINE BESYLATE 5 MG: 5 TABLET ORAL at 04:50

## 2024-05-02 RX ADMIN — MOMETASONE FUROATE AND FORMOTEROL FUMARATE DIHYDRATE 2 PUFF: 100; 5 AEROSOL RESPIRATORY (INHALATION) at 04:51

## 2024-05-02 RX ADMIN — TIOTROPIUM BROMIDE INHALATION SPRAY 5 MCG: 3.12 SPRAY, METERED RESPIRATORY (INHALATION) at 04:51

## 2024-05-02 RX ADMIN — DIBASIC SODIUM PHOSPHATE, MONOBASIC POTASSIUM PHOSPHATE AND MONOBASIC SODIUM PHOSPHATE 250 MG: 852; 155; 130 TABLET ORAL at 00:01

## 2024-05-02 RX ADMIN — QUETIAPINE FUMARATE 12.5 MG: 25 TABLET ORAL at 21:00

## 2024-05-02 ASSESSMENT — ENCOUNTER SYMPTOMS
MYALGIAS: 0
DIZZINESS: 0
NECK PAIN: 0
BACK PAIN: 0
FEVER: 0
NAUSEA: 0
COUGH: 0
VOMITING: 0
SHORTNESS OF BREATH: 0
ORTHOPNEA: 0
CHILLS: 0
HEMOPTYSIS: 0
ABDOMINAL PAIN: 0
PALPITATIONS: 0

## 2024-05-02 ASSESSMENT — FIBROSIS 4 INDEX: FIB4 SCORE: 1.25

## 2024-05-02 NOTE — CARE PLAN
Problem: Knowledge Deficit - Standard  Goal: Patient and family/care givers will demonstrate understanding of plan of care, disease process/condition, diagnostic tests and medications  Outcome: Progressing  Pt educated on POC, all questions answered in regards to disease process, treatment and diet. Pt verbalized understanding and voiced no further questions at this time.     Problem: Fall Risk  Goal: Patient will remain free from falls  Outcome: Progressing  Patient educated on use of the call light and the importance of the bed alarm. Patient remains free from falls. All precautions in place.      The patient is Stable - Low risk of patient condition declining or worsening    Shift Goals  Clinical Goals: education, safety  Patient Goals: rest  Family Goals: PREETI

## 2024-05-02 NOTE — PROGRESS NOTES
Monitor Summary:    Rhythm: SR     HR: 69-84    Measurements:.14/.08/.38    Ectopy: rBig, rPAC              Normal Values  Rhythm SR  HR Range    Measurements 0.12-0.20 / 0.06-0.10  / 0.30-0.52

## 2024-05-02 NOTE — THERAPY
05/02/24 0834   Interdisciplinary Plan of Care Collaboration   Collaboration Comments OT eval attempted. Pt declined due to wanting to sleep. Will attempt one more time but if pt refuses again, order will be completed.

## 2024-05-02 NOTE — CARE PLAN
The patient is Stable - Low risk of patient condition declining or worsening    Shift Goals  Clinical Goals: hemodynamic stability & monitor labs  Patient Goals: go home  Family Goals: PREETI    Progress made toward(s) clinical / shift goals:    Problem: Risk for Infection - COPD  Goal: Patient will remain free from signs and symptoms of infection  Outcome: Progressing     Problem: Ineffective Airway Clearance  Goal: Patient will maintain patent airway with clear/clearing breath sounds  Outcome: Progressing     Problem: Risk for Aspiration  Goal: Patient's risk for aspiration will be absent or decrease  Outcome: Progressing     Problem: Fall Risk  Goal: Patient will remain free from falls  Outcome: Progressing       Patient is not progressing towards the following goals:

## 2024-05-02 NOTE — THERAPY
Speech Language Pathology   Daily Treatment     Patient Name: Ady Blake  AGE:  74 y.o., SEX:  male  Medical Record #: [id number]  Date of Service: 5/2/2024      Precautions:  Precautions: Fall Risk, Swallow Precautions    Subjective  RN cleared patient for dysphagia tx and denied any concerns with current diet of regular solids w/ thin liquids. Patient received awake, alert, low in bed, requiring reposition upright. Flat affect, but cooperative.     Assessment  Patient consumed PO trials of self-delivered soft solids, mixed consistencies, dry solids, and thin liquids via straw. Patient demonstrated appropriate bolus acceptance and adequate containment. Presumed timely AP bolus transit evidenced by complete oral clearance. No s/sx of aspiration noted on any textures trialed.     Clinical Impressions  Patient presents with grossly functional oropharyngeal swallow to continue regular diet w/ thin liquids. Patient does not appear to require any further acute or post-acute SLP services for dysphagia. Please reconsult w/ any difficulty.     Recommendations  Treatment Completed: Dysphagia Treatment     Dysphagia Treatment  Diet Consistency: Regular/thins  Instrumentation: None indicated at this time  Medication: Whole with liquid, As tolerated  Supervision: Distant supervision - check on patient 2-3 times per meal, Encourage self-feeding  Positioning: Fully upright and midline during oral intake, Meals sitting upright in a chair, as tolerated  Risk Management : Small bites/sips, Slow rate of intake, Reduce environmental distractions, Physical mobility, as tolerated  Oral Care: BID    SLP Treatment Plan  Treatment Plan: None Indicated  SLP Frequency: N/A - Evaluation Only  Estimated Duration: N/A - Evaluation Only    Anticipated Discharge Needs  Discharge Recommendations: Anticipate that the patient will have no further speech therapy needs after discharge from the hospital  Therapy Recommendations Upon DC: Not  Indicated    Short Term Goals  Short Term Goal # 1 B : Patient will consume a SB/TN diet with no overt s/sx of aspiration.  Goal Outcome  # 1 B: Goal met  Short Term Goal # 2: NEW 5/1: Patient will consume regular diet w/ thin liquids with distant supervision with no overt s/sx of aspiration or decline in respiratory status.  Goal Outcome # 2 : Goal met    Erinn Boyce, SLP

## 2024-05-02 NOTE — PROGRESS NOTES
Hospital Medicine Daily Progress Note    Date of Service  5/2/2024    Chief Complaint  Ady Blake is a 74 y.o. male admitted 4/26/2024 with fall    Hospital Course:    74-year-old male with history of smoking, alcoholism and hypertension who presented 4/26 with fall. Patient states he is drinking beer every day and smoking half a pack. Patient tripped when he was walking and using cane and fell on his left side, denied any dizziness or lightheadedness, denied any loss of consciousness. Patient lives by himself and usually does all daily activities by himself. Patient was noticed to have cachectic appearance and bruises on his body. Vital signs were stable and patient was on room air. Chest x-ray showed signs of COPD however no signs of pneumonia. CT lumbar, thoracic showed acute compression fracture of T10, CT abdomen showed rib fracture on the left side, seventh, eighth and ninth with diffuse colitis and severe fatty liver disease. Labs showed leukocytosis 17 with elevated liver enzymes ALT 3460 and AST 1279 with total bilirubin 2.1 also patient was found to have bicarb 14 with elevated anion gap and potassium 3.5, IV fluid with CIWA protocol was initiated.  CT head was done and did not show an acute finding.  Patient was very lethargic, CIWA protocol was discontinued to avoid sedation.      Interval Problem Update  -Evaluated examined the patient at bedside, patient is more alert however still confused and having hallucination, continue Ativan as needed and scheduled Seroquel at night.  -Replace electrolytes  -CT head did not show any acute finding, no neurofocal deficit, will hold on MRI for brain at this time.  -Leukocytosis likely related to prednisone  -Discussed with the speech, no dysphagia or risk of aspiration.  - improving on his liver enzymes, holding on MRCP at this time.  -Patient has complicated medical problems and high suspicious for deterioration, consider ICU transfer if needed.  4/30:  Patient seen and examined afebrile, still confused, finished steroid course, today, wbc up to 20 possible related to steroid use   Hypokalemia: potassium of 3.5 repleting   5/1: Patient seen and examined, afebrile MRI done and reviewed no acute issues   Mentation is improving  Hypokalemia: potassium of 3.4 repleting  Hypomagnesemia: repleting   5/2: Patient seen and examined feeling weak, no nausea or vomiting,   Hypokalemia improving  PT/OT eval   Might need placement    I have discussed this patient's plan of care and discharge plan at IDT rounds today with Case Management, Nursing, Nursing leadership, and other members of the IDT team.    Consultants/Specialty  None   Code Status  Full Code    Disposition  The patient is not medically cleared for discharge to home or a post-acute facility.      I have placed the appropriate orders for post-discharge needs.    Review of Systems  Review of Systems   Constitutional:  Negative for chills and fever.   Respiratory:  Negative for cough, hemoptysis and shortness of breath.    Cardiovascular:  Negative for chest pain, palpitations and orthopnea.   Gastrointestinal:  Negative for abdominal pain, nausea and vomiting.   Genitourinary:  Negative for dysuria, frequency and urgency.   Musculoskeletal:  Negative for back pain, myalgias and neck pain.   Neurological:  Negative for dizziness.   All other systems reviewed and are negative.       Physical Exam  Temp:  [36.5 °C (97.7 °F)-37 °C (98.6 °F)] 37 °C (98.6 °F)  Pulse:  [72-88] 72  Resp:  [18] 18  BP: (129-141)/(68-81) 133/72  SpO2:  [93 %-99 %] 95 %    Physical Exam  Constitutional:       General: He is in acute distress.      Appearance: He is ill-appearing.      Comments: Cachectic appearance   Eyes:      General: No scleral icterus.  Cardiovascular:      Rate and Rhythm: Normal rate.      Heart sounds: No murmur heard.  Pulmonary:      Effort: No respiratory distress.      Breath sounds: No wheezing.   Abdominal:       General: There is no distension.      Tenderness: There is no abdominal tenderness. There is no right CVA tenderness, left CVA tenderness or guarding.   Musculoskeletal:      Right lower leg: No edema.      Left lower leg: No edema.   Lymphadenopathy:      Cervical: No cervical adenopathy.   Skin:     Coloration: Skin is not jaundiced.      Findings: Bruising and lesion present. No rash.   Neurological:      General: No focal deficit present.      Mental Status: He is alert. Mental status is at baseline. He is disoriented.      Cranial Nerves: No cranial nerve deficit.      Motor: No weakness.      Gait: Gait normal.         Fluids    Intake/Output Summary (Last 24 hours) at 5/2/2024 1439  Last data filed at 5/2/2024 1300  Gross per 24 hour   Intake 1217 ml   Output 1400 ml   Net -183 ml       Laboratory  Recent Labs     04/30/24  0415 05/01/24  0442 05/02/24  0209   WBC 20.0* 20.2* 16.3*   RBC 3.69* 3.31* 3.36*   HEMOGLOBIN 12.2* 10.9* 11.3*   HEMATOCRIT 35.1* 31.7* 32.7*   MCV 95.1 95.8 97.3   MCH 33.1* 32.9 33.6*   MCHC 34.8 34.4 34.6   RDW 47.8 49.4 51.2*   PLATELETCT 179 191 208   MPV 10.1 10.1 10.4     Recent Labs     04/30/24  0218 05/01/24  0442 05/02/24  0209   SODIUM 137 135 137   POTASSIUM 3.5* 3.4* 4.0   CHLORIDE 106 106 108   CO2 18* 17* 18*   GLUCOSE 105* 97 117*   BUN 7* 9 9   CREATININE 0.48* 0.51 0.66   CALCIUM 7.7* 7.9* 8.1*                     Imaging  MR-BRAIN-W/O   Final Result      1.  No acute abnormality.   2.  Moderate cerebral volume loss.      DX-CHEST-LIMITED (1 VIEW)   Final Result         1.  No acute cardiopulmonary disease.   2.  Atherosclerosis      CT-HEAD W/O   Final Result      There is no definite acute intracranial abnormality.         EC-ECHOCARDIOGRAM COMPLETE W/O CONT   Final Result      DX-CHEST-LIMITED (1 VIEW)   Final Result      No acute process.      US-RUQ   Final Result         1.  Dilatation the right renal pelvis, appearance most compatible with mild hydronephrosis.    2.  Echogenic liver, compatible with fatty change versus fibrosis.   3.  Atherosclerosis      CT-LSPINE W/O PLUS RECONS   Final Result      Mild multilevel degenerative disease without definite fracture.      CT-TSPINE W/O PLUS RECONS   Final Result      1.  Acute superior endplate compression fracture of the T10 vertebral body with moderate height loss.      CT-CHEST,ABDOMEN,PELVIS WITH   Final Result      1.  Acute left lateral seventh, eighth, ninth rib fractures.   2.  Moderate superior endplate compression fracture of the T10 vertebral body which appears acute with fracture lines visible.   3.  Ileum enteritis.   4.  Diffuse colitis.   5.  Severe fatty liver infiltration.      DX-CHEST-PORTABLE (1 VIEW)   Final Result      No acute cardiac or pulmonary abnormalities are identified.           Assessment/Plan  * Acute respiratory failure with hypoxia (HCC)- (present on admission)  Assessment & Plan  Patient is on room air  Likely related to COPD  Inhalers    Metabolic encephalopathy  Assessment & Plan  Patient has delirium, multi factors  Likely related to alcoholism, infection, dehydration and hospitalization  CT head did not show any acute finding  Nonpharmacological treatment  Schedule Seroquel at night  Avoid aggressive sedation and avoid antihistamine  Consider EEG or MRI if there is no improving    Rib fracture  Assessment & Plan  Due to falls  No pneumothorax  Pain control  Respiratory hygiene    Debility  Assessment & Plan  Patient has significant generalized weakness with debility and recurrent falls  PT and OT evaluation  TSH is normal, would likely patient needs placement    Closed fracture of tenth thoracic vertebra (HCC)  Assessment & Plan  From fall  Case was discussed with Dr. Xie neurosurgeon, no intervention needed, brace  Pain control and PT and OT and placement    ACP (advance care planning)  Assessment & Plan  4/26, patient is alert and oriented x 4, discussed the plan of care with the  patient, answered all his questions, discussed the CODE STATUS with the patient, patient wants to be full code.  Time 15 minutes    Leukocytosis  Assessment & Plan  Likely related to stress  No signs of infection or fever, holding on antibiotics at this time    Metabolic acidosis  Assessment & Plan  Likely related to alcoholic, starvation and fall with kidney failure  IV fluids gently, improving on his labs  Infection workup possible aspiration pneumonia:     AVA (acute kidney injury) (HCC)  Assessment & Plan  With creatinine 1.6  Likely related to dehydration  IV fluid and labs daily bladder scan to rule out retention  CPK is normal  Resolved    Elevated liver enzymes  Assessment & Plan  Severely elevated enzymes around thousands   Labs daily  Close monitoring and consider steroid if needed  Ultrasound did not show any acute finding except fatty liver  Improving likely related to alcoholic hepatitis  Holding on MRCP at this time.    Malnutrition (HCC)  Assessment & Plan  Cachectic appearance  Nutrition consult  Encourage the patient to quit drinking alcohol and smoking    Fall  Assessment & Plan  Patient has cachectic appearance, multiple bruises on his body  Patient using cane at home, lives by himself  PT and OT  TSH is normal and vitamin D is pending    Tobacco dependence- (present on admission)  Assessment & Plan  Smoking cessation  Nicotine patch    COPD (chronic obstructive pulmonary disease) (HCC)- (present on admission)  Assessment & Plan  No significant exacerbation  Chest x-ray did not show any signs of infiltrate or or pneumonia  Inhalers,  Pulmonary referral    Alcohol abuse- (present on admission)  Assessment & Plan  Heavy drinking for years  [facility] protocol  High-dose of thiamine and multivitamins  Encouraged the patient to quit    HTN (hypertension)- (present on admission)  Assessment & Plan  Holding blood pressure   Avoid aggressive treatment         Greater than 51 minutes spent prepping to see  patient (e.g. review of tests) obtaining and/or reviewing separately obtained history. Performing a medically appropriate examination and/ evaluation.  Counseling and educating the patient/family/caregiver.  Ordering medications, tests, or procedures.  Referring and communicating with other health care professionals.  Documenting clinical information in EPIC.  Independently interpreting results and communicating results to patient/family/caregiver.  Care coordination.     VTE prophylaxis: Lovenox    I have performed a physical exam and reviewed and updated ROS and Plan today (5/2/2024). In review of yesterday's note (5/1/2024), there are no changes except as documented above.

## 2024-05-02 NOTE — DIETARY
"Nutrition Update:    Day 6 of admit.  Ady Blake is a 74 y.o. male with admitting DX of Acute respiratory failure with hypoxia (HCC) [J96.01].  Patient being followed to optimize nutrition.    Current Diet: Regular w/ supplements  Per ADLs, PO variable from 25% to % w/ avg of ~63%.     Visited pt at bedside. Pt said that meals have been getting better lately. When asked if he drinks the supplements, pt said \"not really.\" When RD asked if he would like to receive them anymore, pt said that he doesn't care as he doesn't want to be here. RD will not d/c supplements at this time as unsure if pt likes/dislikes them at this time.     Problem: Nutritional:  Goal: Achieve adequate nutritional intake  Description: Patient will consume >50% of meals  Outcome: Met.     RD to monitor per department policy.  "

## 2024-05-02 NOTE — PROGRESS NOTES
Report received from night shift RN, assumed care of pt. Pt A&Ox3. Plan of care discussed with pt, labs and chart reviewed. All needs met at this time. Tele box on. On room air. Call light within reach, bed locked and in lowest position. All fall precautions and hourly rounding in place.

## 2024-05-02 NOTE — RESPIRATORY CARE
COPD EDUCATION by COPD CLINICAL EDUCATOR  5/2/2024  at  2:13 PM by Janice Woodward, RRT     Met with patient to review COPD / Asthma.  Patient declined to participate in our full bedside program.  Therefore, a short intervention has been conducted.  A comprehensive packet including information about COPD, types of treatments to manage their disease and safe home Oxygen usage was provided and reviewed with patient at the bedside. He denied smoking any products but has a past history per EMR. We reviewed his current respiratory medications and updated his Action Plan.  COPD Screen  COPD Risk Screening  Do you have a history of COPD?: Yes  Do you have a Pulmonologist?: No  COPD Population Screener  During the past 4 weeks, how much did you feel short of breath?: Some of the time  Do you ever cough up any mucus or phlegm?: Yes, a few days a week or month  In the past 12 months, you do less than you used to because of your breathing problems: Disagree/unsure  Have you smoked at least 100 cigarettes in your entire life?: Yes  How old are you?: 60+  COPD Screening Score: 6  COPD Coordinator Recommended: Yes    COPD Assessment  COPD Clinical Specialists ONLY  COPD Education Initiated: Yes--Short Intervention (met with pt; angry but able to review COPD with him; he acknowledges DX/HX; he is [place];answered and listened to questions & concerns; We reviewed spacer technique (declined new) denies smoking)  Is this a COPD exacerbation patient?: No  DME Company: none prior  DME Equipment Type: none prior  Physician Name: Mark Benito P.A.-C.  Pulmonologist Name: refused to discuss  Referrals Initiated: Yes  Smoking Cessation: Declined (adamantely denies)  Home Health Care:  (TBD at this encounter)  Mobile Urgent Care Services: Declined  [facility]: Declined  $ Demo/Eval of SVN's, MDI's and Aerosols: Yes    PFT Results  (OP) Pulmonary Function Testing:  (refused to answer none found in EMR  "review)  Interdisciplinary Rounds: Attendance at Rounds (30 Min)  No results found for: \"PFT\"       MY COPD ACTION PLAN     It is recommended that patients and physicians /healthcare providers complete this action plan together. This plan should be discussed at each physician visit and updated as needed.    The green, yellow and red zones show groups of symptoms of COPD. This list of symptoms is not comprehensive, and you may experience other symptoms. In the \"Actions\" column, your healthcare provider has recommended actions for you to take based on your symptoms.    Patient Name: Ady Blake   YOB: 1950   Last Updated on: 5/2/2024  2:04 PM   Green Zone:  I am doing well today Actions     Usual activitiy and exercise level   Take daily medications     Usual amounts of cough and phlegm/mucus   Use oxygen as prescribed     Sleep well at night   Continue regular exercise/diet plan     Appetite is good   At all times avoid cigarette smoke, inhaled irritants     Daily Medications (these medications are taken every day):   Tiotropium Bromide Monohydrate (Spiriva)  Budesonide-Formoterol Fumarate (Symbicort) 1 capsule  2 Puffs Once daily  Twice daily     Additional Information:  Use spacer and rinse mouth after taking your Symbicort (you used similar medication while in the hospital called Dulera <DO NOT> take both)    Yellow Zone:  I am having a bad day or a COPD flare Actions     More breathless than usual   Continue daily medications     I have less energy for my daily activities   Use quick relief inhaler as ordered     Increased or thicker phlegm/mucus   Use oxygen as prescribed     Using quick relief inhaler/nebulizer more often   Get plenty of rest     Swelling of ankles more than usual   Use pursed lip breathing     More coughing than usual   At all times avoid cigarette smoke, inhaled irritants     I feel like I have a \"chest cold\"     Poor sleep and my symptoms woke me up     My appetite " is not good     My medicine is not helping      Call provider immediately if symptoms don’t improve     Continue daily medications, add rescue medications:   Albuterol 2 Puffs Every 4 hours PRN       Medications to be used during a flare up, (as Discussed with Provider):           Additional Information:  Use spacer    Red Zone:  I need urgent medical care Actions     Severe shortness of breath even at rest   Call 911 or seek medical care immediately     Not able to do any activity because of breathing      Fever or shaking chills      Feeling confused or very drowsy       Chest pains      Coughing up blood

## 2024-05-02 NOTE — DISCHARGE PLANNING
"SW called pt's son to discuss barrier to dcp as pt is refusing to work with PT/OT. Per pt's son, his sister came by to visit pt yesterday and she spoke to pt regarding importance to working with PT/OT. Pt's son is currently out of town and sister fell sick. Per pt's son, he knows his father wants to return home but will be lacking the support. Per pt's son, he would like to talk to his sister regarding \"game plan\" for pt and talk to this SW tomorrow to create safe discharge plan. Considering [facility]/in-home care for pt.  "

## 2024-05-02 NOTE — THERAPY
"Physical Therapy Education  Discharge Note    Patient Name: Ady Blake  Age:  74 y.o., Sex:  male  Medical Record #: [id number]  Today's Date: 5/2/2024    PT dorian attempted x2 today. Pt sleeping in AM requesting therapist return in afternoon so he can nap. Therapist returned in afternoon, pt requesting this therapist come back tomorrow. Educated pt that he has refused PT alone 3 times despite education on role and importance of mobility and safe d/c planning, if pt keeps refusing  we would need to part ways at some point. Pt stating \"okay, part ways then.\" Will sign off. Please re-consult if pt agreeable to working with PT in the future.    Juan Alberto Donaldson PT, DPT   "

## 2024-05-03 LAB
ALBUMIN SERPL BCP-MCNC: 3.1 G/DL (ref 3.2–4.9)
ALBUMIN/GLOB SERPL: 1.2 G/DL
ALP SERPL-CCNC: 98 U/L (ref 30–99)
ALT SERPL-CCNC: 248 U/L (ref 2–50)
ANION GAP SERPL CALC-SCNC: 13 MMOL/L (ref 7–16)
ANISOCYTOSIS BLD QL SMEAR: ABNORMAL
AST SERPL-CCNC: 52 U/L (ref 12–45)
BASOPHILS # BLD AUTO: 0 % (ref 0–1.8)
BASOPHILS # BLD: 0 K/UL (ref 0–0.12)
BILIRUB SERPL-MCNC: 0.9 MG/DL (ref 0.1–1.5)
BUN SERPL-MCNC: 8 MG/DL (ref 8–22)
CALCIUM ALBUM COR SERPL-MCNC: 9.7 MG/DL (ref 8.5–10.5)
CALCIUM SERPL-MCNC: 9 MG/DL (ref 8.5–10.5)
CHLORIDE SERPL-SCNC: 103 MMOL/L (ref 96–112)
CO2 SERPL-SCNC: 18 MMOL/L (ref 20–33)
COMMENT NL1176: NORMAL
CREAT SERPL-MCNC: 0.56 MG/DL (ref 0.5–1.4)
EOSINOPHIL # BLD AUTO: 0 K/UL (ref 0–0.51)
EOSINOPHIL NFR BLD: 0 % (ref 0–6.9)
ERYTHROCYTE [DISTWIDTH] IN BLOOD BY AUTOMATED COUNT: 52.5 FL (ref 35.9–50)
GFR SERPLBLD CREATININE-BSD FMLA CKD-EPI: 104 ML/MIN/1.73 M 2
GLOBULIN SER CALC-MCNC: 2.6 G/DL (ref 1.9–3.5)
GLUCOSE SERPL-MCNC: 117 MG/DL (ref 65–99)
HCT VFR BLD AUTO: 34.1 % (ref 42–52)
HGB BLD-MCNC: 11.3 G/DL (ref 14–18)
LYMPHOCYTES # BLD AUTO: 6.3 K/UL (ref 1–4.8)
LYMPHOCYTES NFR BLD: 39.4 % (ref 22–41)
MACROCYTES BLD QL SMEAR: ABNORMAL
MAGNESIUM SERPL-MCNC: 1.4 MG/DL (ref 1.5–2.5)
MANUAL DIFF BLD: NORMAL
MCH RBC QN AUTO: 32.7 PG (ref 27–33)
MCHC RBC AUTO-ENTMCNC: 33.1 G/DL (ref 32.3–36.5)
MCV RBC AUTO: 98.6 FL (ref 81.4–97.8)
MONOCYTES # BLD AUTO: 0.78 K/UL (ref 0–0.85)
MONOCYTES NFR BLD AUTO: 4.9 % (ref 0–13.4)
MORPHOLOGY BLD-IMP: NORMAL
NEUTROPHILS # BLD AUTO: 8.91 K/UL (ref 1.82–7.42)
NEUTROPHILS NFR BLD: 55.7 % (ref 44–72)
NRBC # BLD AUTO: 0.02 K/UL
NRBC BLD-RTO: 0.1 /100 WBC (ref 0–0.2)
PHOSPHATE SERPL-MCNC: 3.3 MG/DL (ref 2.5–4.5)
PLATELET # BLD AUTO: 216 K/UL (ref 164–446)
PLATELET BLD QL SMEAR: NORMAL
PMV BLD AUTO: 10.8 FL (ref 9–12.9)
POTASSIUM SERPL-SCNC: 4.8 MMOL/L (ref 3.6–5.5)
PROT SERPL-MCNC: 5.7 G/DL (ref 6–8.2)
RBC # BLD AUTO: 3.46 M/UL (ref 4.7–6.1)
RBC BLD AUTO: PRESENT
SMUDGE CELLS BLD QL SMEAR: NORMAL
SODIUM SERPL-SCNC: 134 MMOL/L (ref 135–145)
WBC # BLD AUTO: 16 K/UL (ref 4.8–10.8)

## 2024-05-03 PROCEDURE — 84100 ASSAY OF PHOSPHORUS: CPT

## 2024-05-03 PROCEDURE — 83735 ASSAY OF MAGNESIUM: CPT

## 2024-05-03 PROCEDURE — A9270 NON-COVERED ITEM OR SERVICE: HCPCS | Performed by: INTERNAL MEDICINE

## 2024-05-03 PROCEDURE — 85007 BL SMEAR W/DIFF WBC COUNT: CPT

## 2024-05-03 PROCEDURE — 700111 HCHG RX REV CODE 636 W/ 250 OVERRIDE (IP): Mod: JZ | Performed by: INTERNAL MEDICINE

## 2024-05-03 PROCEDURE — 80053 COMPREHEN METABOLIC PANEL: CPT

## 2024-05-03 PROCEDURE — 99231 SBSQ HOSP IP/OBS SF/LOW 25: CPT | Performed by: INTERNAL MEDICINE

## 2024-05-03 PROCEDURE — 36415 COLL VENOUS BLD VENIPUNCTURE: CPT

## 2024-05-03 PROCEDURE — 770006 HCHG ROOM/CARE - MED/SURG/GYN SEMI*

## 2024-05-03 PROCEDURE — 85027 COMPLETE CBC AUTOMATED: CPT

## 2024-05-03 PROCEDURE — 700102 HCHG RX REV CODE 250 W/ 637 OVERRIDE(OP): Performed by: INTERNAL MEDICINE

## 2024-05-03 PROCEDURE — 97166 OT EVAL MOD COMPLEX 45 MIN: CPT

## 2024-05-03 RX ADMIN — Medication 100 MG: at 17:15

## 2024-05-03 RX ADMIN — OXYCODONE 5 MG: 5 TABLET ORAL at 14:01

## 2024-05-03 RX ADMIN — ENOXAPARIN SODIUM 40 MG: 100 INJECTION SUBCUTANEOUS at 17:16

## 2024-05-03 RX ADMIN — TIOTROPIUM BROMIDE INHALATION SPRAY 5 MCG: 3.12 SPRAY, METERED RESPIRATORY (INHALATION) at 05:22

## 2024-05-03 RX ADMIN — AMLODIPINE BESYLATE 5 MG: 5 TABLET ORAL at 05:21

## 2024-05-03 RX ADMIN — MOMETASONE FUROATE AND FORMOTEROL FUMARATE DIHYDRATE 2 PUFF: 100; 5 AEROSOL RESPIRATORY (INHALATION) at 05:22

## 2024-05-03 RX ADMIN — OMEPRAZOLE 20 MG: 20 CAPSULE, DELAYED RELEASE ORAL at 05:21

## 2024-05-03 RX ADMIN — DIBASIC SODIUM PHOSPHATE, MONOBASIC POTASSIUM PHOSPHATE AND MONOBASIC SODIUM PHOSPHATE 250 MG: 852; 155; 130 TABLET ORAL at 05:21

## 2024-05-03 RX ADMIN — DIBASIC SODIUM PHOSPHATE, MONOBASIC POTASSIUM PHOSPHATE AND MONOBASIC SODIUM PHOSPHATE 250 MG: 852; 155; 130 TABLET ORAL at 01:23

## 2024-05-03 RX ADMIN — MOMETASONE FUROATE AND FORMOTEROL FUMARATE DIHYDRATE 2 PUFF: 100; 5 AEROSOL RESPIRATORY (INHALATION) at 17:45

## 2024-05-03 RX ADMIN — DIBASIC SODIUM PHOSPHATE, MONOBASIC POTASSIUM PHOSPHATE AND MONOBASIC SODIUM PHOSPHATE 250 MG: 852; 155; 130 TABLET ORAL at 17:15

## 2024-05-03 ASSESSMENT — COGNITIVE AND FUNCTIONAL STATUS - GENERAL
HELP NEEDED FOR BATHING: A LOT
SUGGESTED CMS G CODE MODIFIER DAILY ACTIVITY: CK
WALKING IN HOSPITAL ROOM: A LOT
PERSONAL GROOMING: A LITTLE
DRESSING REGULAR LOWER BODY CLOTHING: A LOT
SUGGESTED CMS G CODE MODIFIER DAILY ACTIVITY: CK
CLIMB 3 TO 5 STEPS WITH RAILING: A LOT
TURNING FROM BACK TO SIDE WHILE IN FLAT BAD: A LOT
EATING MEALS: A LITTLE
DAILY ACTIVITIY SCORE: 15
HELP NEEDED FOR BATHING: A LOT
MOBILITY SCORE: 12
STANDING UP FROM CHAIR USING ARMS: A LOT
SUGGESTED CMS G CODE MODIFIER MOBILITY: CL
TOILETING: A LOT
TOILETING: A LOT
MOVING FROM LYING ON BACK TO SITTING ON SIDE OF FLAT BED: A LOT
DRESSING REGULAR LOWER BODY CLOTHING: A LOT
DRESSING REGULAR UPPER BODY CLOTHING: A LITTLE
DRESSING REGULAR UPPER BODY CLOTHING: A LITTLE
MOVING TO AND FROM BED TO CHAIR: A LOT
DAILY ACTIVITIY SCORE: 15
PERSONAL GROOMING: A LITTLE
EATING MEALS: A LITTLE

## 2024-05-03 ASSESSMENT — FIBROSIS 4 INDEX: FIB4 SCORE: 1.16

## 2024-05-03 ASSESSMENT — PAIN DESCRIPTION - PAIN TYPE
TYPE: ACUTE PAIN
TYPE: ACUTE PAIN

## 2024-05-03 ASSESSMENT — ENCOUNTER SYMPTOMS
ABDOMINAL PAIN: 0
NECK PAIN: 0
HEMOPTYSIS: 0
PALPITATIONS: 0
COUGH: 0
NAUSEA: 0
FEVER: 0
VOMITING: 0
CHILLS: 0
SHORTNESS OF BREATH: 0
DIZZINESS: 0
ORTHOPNEA: 0
BACK PAIN: 0
MYALGIAS: 0

## 2024-05-03 ASSESSMENT — ACTIVITIES OF DAILY LIVING (ADL): TOILETING: INDEPENDENT

## 2024-05-03 NOTE — CARE PLAN
The patient is Stable - Low risk of patient condition declining or worsening    Shift Goals  Clinical Goals: (P) VSS, safety  Patient Goals: (P) rest  Family Goals: (P) PREETI    Progress made toward(s) clinical / shift goals:      Problem: Knowledge Deficit - Standard  Goal: Patient and family/care givers will demonstrate understanding of plan of care, disease process/condition, diagnostic tests and medications  Outcome: Progressing     Problem: Knowledge Deficit - COPD  Goal: Patient/significant other demonstrates understanding of disease process, utilization of the Action Plan, medications and discharge instruction  Outcome: Progressing     Problem: Risk for Infection - COPD  Goal: Patient will remain free from signs and symptoms of infection  Outcome: Progressing     Problem: Nutrition - Advanced  Goal: Patient will display progressive weight gain toward goal have adequate food and fluid intake  Outcome: Progressing     Problem: Risk for Aspiration  Goal: Patient's risk for aspiration will be absent or decrease  Outcome: Progressing     Problem: Self Care  Goal: Patient will have the ability to perform ADLs independently or with assistance (bathe, groom, dress, toilet and feed)  Outcome: Progressing     Problem: Optimal Care for Alcohol Withdrawal  Goal: Optimal Care for the alcohol withdrawal patient  Outcome: Progressing     Problem: Seizure Precautions  Goal: Implementation of seizure precautions  Outcome: Progressing     Problem: Skin Integrity  Goal: Skin integrity is maintained or improved  Outcome: Progressing     Problem: Fall Risk  Goal: Patient will remain free from falls  Outcome: Progressing     Problem: Pain - Standard  Goal: Alleviation of pain or a reduction in pain to the patient’s comfort goal  Outcome: Progressing     Patient is not progressing towards the following goals:

## 2024-05-03 NOTE — DISCHARGE PLANNING
Case Management Discharge Planning    Admission Date: 4/26/2024  GMLOS: 4.9  ALOS: 7    6-Clicks ADL Score: 15  6-Clicks Mobility Score: 12  PT and/or OT Eval ordered: Yes  Post-acute Referrals Ordered: Yes  Post-acute Choice Obtained: Yes  Has referral(s) been sent to post-acute provider:  Yes      Anticipated Discharge Dispo: Discharge Disposition: D/T to SNF with Medicare cert in anticipation of skilled care (03)    DME Needed: No    Action(s) Taken: OTHER    SW met with Elmer, patient's son. SW explained SNF Referrals were updated and resubmitted. Elmer expressed concerns regarding patient's inability to care for himself. Elmer states him and his sister live in [place]; however, patient will not be able to stay at their home. SW provided information regarding [facility], [facility], and Personal Caregivers.     Escalations Completed: None    Medically Clear: No    Next Steps: Awaiting SNF Responses.    Barriers to Discharge: Pending Placement    Is the patient up for discharge tomorrow:

## 2024-05-03 NOTE — PROGRESS NOTES
Report received from night shift RN, assumed care of pt. Pt A&Ox3. Plan of care discussed with pt, labs and chart reviewed. All needs met at this time. On room air. Call light within reach, bed locked and in lowest position. All fall precautions and hourly rounding in place.

## 2024-05-03 NOTE — PROGRESS NOTES
Hospital Medicine Daily Progress Note    Date of Service  5/3/2024    Chief Complaint  Ady Blake is a 74 y.o. male admitted 4/26/2024 with fall    Hospital Course:    74-year-old male with history of smoking, alcoholism and hypertension who presented 4/26 with fall. Patient states he is drinking beer every day and smoking half a pack. Patient tripped when he was walking and using cane and fell on his left side, denied any dizziness or lightheadedness, denied any loss of consciousness. Patient lives by himself and usually does all daily activities by himself. Patient was noticed to have cachectic appearance and bruises on his body. Vital signs were stable and patient was on room air. Chest x-ray showed signs of COPD however no signs of pneumonia. CT lumbar, thoracic showed acute compression fracture of T10, CT abdomen showed rib fracture on the left side, seventh, eighth and ninth with diffuse colitis and severe fatty liver disease. Labs showed leukocytosis 17 with elevated liver enzymes ALT 3460 and AST 1279 with total bilirubin 2.1 also patient was found to have bicarb 14 with elevated anion gap and potassium 3.5, IV fluid with CIWA protocol was initiated.  CT head was done and did not show an acute finding.  Patient was very lethargic, CIWA protocol was discontinued to avoid sedation.      Interval Problem Update  -Evaluated examined the patient at bedside, patient is more alert however still confused and having hallucination, continue Ativan as needed and scheduled Seroquel at night.  -Replace electrolytes  -CT head did not show any acute finding, no neurofocal deficit, will hold on MRI for brain at this time.  -Leukocytosis likely related to prednisone  -Discussed with the speech, no dysphagia or risk of aspiration.  - improving on his liver enzymes, holding on MRCP at this time.  -Patient has complicated medical problems and high suspicious for deterioration, consider ICU transfer if needed.  4/30:  Patient seen and examined afebrile, still confused, finished steroid course, today, wbc up to 20 possible related to steroid use   Hypokalemia: potassium of 3.5 repleting   5/1: Patient seen and examined, afebrile MRI done and reviewed no acute issues   Mentation is improving  Hypokalemia: potassium of 3.4 repleting  Hypomagnesemia: repleting   5/2: Patient seen and examined feeling weak, no nausea or vomiting,   Hypokalemia improving  PT/OT eval   Might need placement    5/3: Patient seen and examined, afebrile no nausea or vomiting.  No acute evnts overnight   Awaiting placement   I have discussed this patient's plan of care and discharge plan at IDT rounds today with Case Management, Nursing, Nursing leadership, and other members of the IDT team.    Consultants/Specialty  None   Code Status  Full Code    Disposition  The patient is medically cleared for discharge to home or a post-acute facility.      I have placed the appropriate orders for post-discharge needs.    Review of Systems  Review of Systems   Constitutional:  Negative for chills and fever.   Respiratory:  Negative for cough, hemoptysis and shortness of breath.    Cardiovascular:  Negative for chest pain, palpitations and orthopnea.   Gastrointestinal:  Negative for abdominal pain, nausea and vomiting.   Genitourinary:  Negative for dysuria, frequency and urgency.   Musculoskeletal:  Negative for back pain, myalgias and neck pain.   Neurological:  Negative for dizziness.   All other systems reviewed and are negative.       Physical Exam  Temp:  [35.9 °C (96.6 °F)-37.1 °C (98.8 °F)] 35.9 °C (96.6 °F)  Pulse:  [77-98] 83  Resp:  [18-20] 20  BP: (115-135)/(67-83) 115/67  SpO2:  [94 %-99 %] 96 %    Physical Exam  Constitutional:       General: He is in acute distress.      Appearance: He is ill-appearing.      Comments: Cachectic appearance   Eyes:      General: No scleral icterus.  Cardiovascular:      Rate and Rhythm: Normal rate.      Heart sounds: No  murmur heard.  Pulmonary:      Effort: No respiratory distress.      Breath sounds: No wheezing.   Abdominal:      General: There is no distension.      Tenderness: There is no abdominal tenderness. There is no right CVA tenderness, left CVA tenderness or guarding.   Musculoskeletal:      Right lower leg: No edema.      Left lower leg: No edema.   Lymphadenopathy:      Cervical: No cervical adenopathy.   Skin:     Coloration: Skin is not jaundiced.      Findings: Bruising and lesion present. No rash.   Neurological:      General: No focal deficit present.      Mental Status: He is alert. Mental status is at baseline. He is disoriented.      Cranial Nerves: No cranial nerve deficit.      Motor: No weakness.      Gait: Gait normal.         Fluids    Intake/Output Summary (Last 24 hours) at 5/3/2024 1557  Last data filed at 5/3/2024 0900  Gross per 24 hour   Intake 250 ml   Output 950 ml   Net -700 ml       Laboratory  Recent Labs     05/01/24 0442 05/02/24  0209 05/03/24  0131   WBC 20.2* 16.3* 16.0*   RBC 3.31* 3.36* 3.46*   HEMOGLOBIN 10.9* 11.3* 11.3*   HEMATOCRIT 31.7* 32.7* 34.1*   MCV 95.8 97.3 98.6*   MCH 32.9 33.6* 32.7   MCHC 34.4 34.6 33.1   RDW 49.4 51.2* 52.5*   PLATELETCT 191 208 216   MPV 10.1 10.4 10.8     Recent Labs     05/01/24  0442 05/02/24  0209 05/03/24  0131   SODIUM 135 137 134*   POTASSIUM 3.4* 4.0 4.8   CHLORIDE 106 108 103   CO2 17* 18* 18*   GLUCOSE 97 117* 117*   BUN 9 9 8   CREATININE 0.51 0.66 0.56   CALCIUM 7.9* 8.1* 9.0                     Imaging  MR-BRAIN-W/O   Final Result      1.  No acute abnormality.   2.  Moderate cerebral volume loss.      DX-CHEST-LIMITED (1 VIEW)   Final Result         1.  No acute cardiopulmonary disease.   2.  Atherosclerosis      CT-HEAD W/O   Final Result      There is no definite acute intracranial abnormality.         EC-ECHOCARDIOGRAM COMPLETE W/O CONT   Final Result      DX-CHEST-LIMITED (1 VIEW)   Final Result      No acute process.      US-RUQ    Final Result         1.  Dilatation the right renal pelvis, appearance most compatible with mild hydronephrosis.   2.  Echogenic liver, compatible with fatty change versus fibrosis.   3.  Atherosclerosis      CT-LSPINE W/O PLUS RECONS   Final Result      Mild multilevel degenerative disease without definite fracture.      CT-TSPINE W/O PLUS RECONS   Final Result      1.  Acute superior endplate compression fracture of the T10 vertebral body with moderate height loss.      CT-CHEST,ABDOMEN,PELVIS WITH   Final Result      1.  Acute left lateral seventh, eighth, ninth rib fractures.   2.  Moderate superior endplate compression fracture of the T10 vertebral body which appears acute with fracture lines visible.   3.  Ileum enteritis.   4.  Diffuse colitis.   5.  Severe fatty liver infiltration.      DX-CHEST-PORTABLE (1 VIEW)   Final Result      No acute cardiac or pulmonary abnormalities are identified.           Assessment/Plan  * Acute respiratory failure with hypoxia (HCC)- (present on admission)  Assessment & Plan  Patient is on room air  Likely related to COPD  Inhalers    Metabolic encephalopathy  Assessment & Plan  Patient has delirium, multi factors  Likely related to alcoholism, infection, dehydration and hospitalization  CT head did not show any acute finding  Nonpharmacological treatment  Schedule Seroquel at night  Avoid aggressive sedation and avoid antihistamine  Consider EEG or MRI if there is no improving    Rib fracture  Assessment & Plan  Due to falls  No pneumothorax  Pain control  Respiratory hygiene    Debility  Assessment & Plan  Patient has significant generalized weakness with debility and recurrent falls  PT and OT evaluation  TSH is normal, would likely patient needs placement    Closed fracture of tenth thoracic vertebra (HCC)  Assessment & Plan  From fall  Case was discussed with Dr. Xie neurosurgeon, no intervention needed, brace  Pain control and PT and OT and placement    ACP (advance  care planning)  Assessment & Plan  4/26, patient is alert and oriented x 4, discussed the plan of care with the patient, answered all his questions, discussed the CODE STATUS with the patient, patient wants to be full code.  Time 15 minutes    Leukocytosis  Assessment & Plan  Likely related to stress  No signs of infection or fever, holding on antibiotics at this time    Metabolic acidosis  Assessment & Plan  Likely related to alcoholic, starvation and fall with kidney failure  IV fluids gently, improving on his labs  Infection workup possible aspiration pneumonia:     AVA (acute kidney injury) ([facility])  Assessment & Plan  With creatinine 1.6  Likely related to dehydration  IV fluid and labs daily bladder scan to rule out retention  CPK is normal  Resolved    Elevated liver enzymes  Assessment & Plan  Severely elevated enzymes around thousands   Labs daily  Close monitoring and consider steroid if needed  Ultrasound did not show any acute finding except fatty liver  Improving likely related to alcoholic hepatitis  Holding on MRCP at this time.    Malnutrition (HCC)  Assessment & Plan  Cachectic appearance  Nutrition consult  Encourage the patient to quit drinking alcohol and smoking    Fall  Assessment & Plan  Patient has cachectic appearance, multiple bruises on his body  Patient using cane at home, lives by himself  PT and OT  TSH is normal and vitamin D is pending    Tobacco dependence- (present on admission)  Assessment & Plan  Smoking cessation  Nicotine patch    COPD (chronic obstructive pulmonary disease) ([facility])- (present on admission)  Assessment & Plan  No significant exacerbation  Chest x-ray did not show any signs of infiltrate or or pneumonia  Inhalers,  Pulmonary referral    Alcohol abuse- (present on admission)  Assessment & Plan  Heavy drinking for years  [facility] protocol  High-dose of thiamine and multivitamins  Encouraged the patient to quit    HTN (hypertension)- (present on admission)  Assessment &  Plan  Holding blood pressure   Avoid aggressive treatment      VTE prophylaxis: Lovenox    I have performed a physical exam and reviewed and updated ROS and Plan today (5/3/2024). In review of yesterday's note (5/2/2024), there are no changes except as documented above.

## 2024-05-03 NOTE — CARE PLAN
The patient is Stable - Low risk of patient condition declining or worsening    Shift Goals  Clinical Goals: remain free from falls, sleep, report pain < 3 after PRN med admin  Patient Goals: rest, home  Family Goals: PREETI    Progress made toward(s) clinical / shift goals:    1. Pt remained free from falls throughout shift. Bed is locked and in lowest position. Call light and personal belongings within reach.  2. Pt able to sleep a few hours throughout shift.  3. Pt reported pain < 3 after administration of PRN pain medication.      Problem: Risk for Aspiration  Goal: Patient's risk for aspiration will be absent or decrease  Outcome: Progressing     Problem: Psychosocial  Goal: Patient's level of anxiety will decrease  Outcome: Progressing     Problem: Skin Integrity  Goal: Skin integrity is maintained or improved  Outcome: Progressing     Problem: Fall Risk  Goal: Patient will remain free from falls  Outcome: Progressing       Patient is not progressing towards the following goals:

## 2024-05-03 NOTE — PROGRESS NOTES
Assumed care of patient at 1330. Received report from tele RN Jose M. Pt is A&O x 3, on RA. Reporting a pain level of 8/10, medicated with oxycodone per MAR. Assessment completed and VSS. Bowel sounds are active in all 4 quadrants. Lung sounds are diminished in lower lobes. Pt ambulated from wheelchair to bed with unsteady gait. Bed is in lowest position and call light is within reach. Fall precautions are in place.

## 2024-05-03 NOTE — PROGRESS NOTES
Bedside report received, pt care assumed, tele box on. Pt denies pain. Pt A&O x3, disoriented to situation. Pt resting on room air, SpO2 95%. Pt denies any additional needs at this time. Bed in lowest position, bed alarm on pt educated on fall risk and verbalized understanding, call light within reach.

## 2024-05-03 NOTE — PROGRESS NOTES
4 Eyes Skin Assessment Completed by ALLAN Howe and Maria De Jesus RN.    Head WDL  Ears WDL  Nose WDL  Mouth WDL  Neck WDL  Breast/Chest WDL  Shoulder Blades WDL  Spine Redness and Blanching  (R) Arm/Elbow/Hand Bruising  (L) Arm/Elbow/Hand Bruising  Abdomen Bruising  Groin WDL  Scrotum/Coccyx/Buttocks Redness and Blanching  (R) Leg Bruising  (L) Leg Bruising  (R) Heel/Foot/Toe WDL  (L) Heel/Foot/Toe WDL          Devices In Places N/A      Interventions In Place Barrier Cream    Possible Skin Injury No    Pictures Uploaded Into Epic N/A  Wound Consult Placed N/A  RN Wound Prevention Protocol Ordered No

## 2024-05-03 NOTE — THERAPY
Occupational Therapy   Initial Evaluation     Patient Name: Ady Blake  Age:  74 y.o., Sex:  male  Medical Record #: [id number]  Today's Date: 5/3/2024     Precautions  Precautions: Fall Risk, Swallow Precautions  Comments: ETOH    Assessment    Patient is 74 y.o. male admitted after a fall, found to have T10 fx, and L 7-9 rib fx in addition to respiratory failure with hypoxia. Other pertinent medical history includes smoking, alcoholism, HTN, COPD, and metabolic acidosis. Pt seen for OT evaluation. Pt required CGA for functional mobility, CGA-min A for bed mobility, mod A for toilet transfer, and max A to don/doff socks. Pt lived alone prior to this admission, but has a son/sister who may be able to provide limited assistance. Most history obtained via chart review. Pt educated regarding the role of OT and the pathology of bedrest. Pt is encouraged to be OOB for all meals and to continue mobilizing with nursing staff. Pt current functional performance limited by impaired activity tolerance, impaired balance, generalized weakness, impaired cognition, and pain. Pt will benefit from skilled OT while admitted to acute care.     Plan    Occupational Therapy Initial Treatment Plan   Treatment Interventions: Self Care / Activities of Daily Living, Adaptive Equipment, Neuro Re-Education / Balance, Therapeutic Exercises, Therapeutic Activity  Treatment Frequency: 3 Times per Week  Duration: Until Therapy Goals Met    DC Equipment Recommendations: Unable to determine at this time  Discharge Recommendations: Recommend post-acute placement for additional occupational therapy services prior to discharge home      Objective     05/03/24 0959   Prior Living Situation   Prior Services None   Housing / Facility 1 Story House   Steps Into Home 0   Steps In Home 0   Bathroom Set up Bathtub / Shower Combination;Shower Chair;Grab Bars   Equipment Owned Single Point Cane;Front-Wheel Walker;Tub / Shower Seat;Grab Bar(s) In Tub /  Shower;Grab Bar(s) By Toilet   Lives with - Patient's Self Care Capacity Alone and Unable to Care For Self   Comments Majority of history obtained from chart. Per chart, pt has a son and sister who may be able to provide some support, however, limited.   Prior Level of ADL Function   Self Feeding Independent   Grooming / Hygiene Independent   Bathing Independent   Dressing Independent   Toileting Independent   Comments Pt reported I prior to admission   Prior Level of IADL Function   Medication Management Independent   Laundry Independent   Kitchen Mobility Independent   Finances Independent   Home Management Independent   Shopping Independent   Prior Level Of Mobility Independent Without Device in Community;Independent Without Device in Home   Comments Pt reported I prior to admission   History of Falls   History of Falls Yes   Date of Last Fall   (reason for admission, multiple per chart)   Precautions   Precautions Fall Risk;Swallow Precautions   Comments ETOH   Vitals   Pulse 98   Blood Pressure  121/71   Pulse Oximetry 98 %   O2 Delivery Device None - Room Air   Vitals Comments noted some increased work of breathing after walk to bathroom, vitals stable   Pain   Pain Scales 0 to 10 Scale    Pain 0 - 10 Group   Therapist Pain Assessment Post Activity Pain Same as Prior to Activity;Nurse Notified  (some pain reported in ribs/back, not rated)   Cognition    Cognition / Consciousness X   Level of Consciousness Alert   Safety Awareness Impaired   New Learning Impaired   Attention Impaired   Comments Cooperative given education   Passive ROM Upper Body   Passive ROM Upper Body WDL   Active ROM Upper Body   Active ROM Upper Body  WDL   Strength Upper Body   Upper Body Strength  WDL   Upper Body Muscle Tone   Upper Body Muscle Tone  WDL   Coordination Upper Body   Comments impaired during functional tasks   Balance Assessment   Sitting Balance (Static) Fair   Sitting Balance (Dynamic) Fair -   Standing Balance (Static)  "Poor +   Standing Balance (Dynamic) Poor +   Weight Shift Sitting Fair   Weight Shift Standing Fair   Comments w/ FWW, pt frequently leaving walking behind to reach for other objects, cues for safety w/ walker   Bed Mobility    Supine to Sit Minimal Assist   Sit to Supine Contact Guard Assist   Scooting Standby Assist   Rolling Standby Assist   Comments cues for log roll, heavy use of bed rails   ADL Assessment   Lower Body Dressing Maximal Assist  (don socks)   Toileting   (toilet transfer only)   Functional Mobility   Sit to Stand Contact Guard Assist   Bed, Chair, Wheelchair Transfer Contact Guard Assist   Toilet Transfers Moderate Assist   Transfer Method Stand Step   Mobility EOB>bathroom>bed   Comments w/ FWW   Visual Perception   Visual Perception  Not Tested   Activity Tolerance   Sitting in Chair <5 min on toilet   Sitting Edge of Bed <5 min   Standing ~5 min   Comments noted increased work of breathing throughout, required extended time sitting on toilet to recover   Patient / Family Goals   Patient / Family Goal #1 \"I want to go home\"   Short Term Goals   Short Term Goal # 1 Pt will perform LB dressing w/ supv and AE PRN   Short Term Goal # 2 Pt will perform ADL transfer w/ supv   Short Term Goal # 3 Pt will perform standing g/h w/ supv   Education Group   Education Provided Role of Occupational Therapist;Activities of Daily Living;Pathology of bedrest;Home Safety  (discussed post acute placement, however pt wants to go home)   Role of Occupational Therapist Patient Response Patient;Acceptance;Explanation;Verbal Demonstration;Reinforcement Needed   Home Safety Patient Response Patient;Acceptance;Explanation;Verbal Demonstration;Reinforcement Needed   ADL Patient Response Patient;Acceptance;Explanation;Demonstration;Verbal Demonstration;Action Demonstration   Pathology of Bedrest Patient Response Patient;Acceptance;Explanation;Verbal Demonstration;Reinforcement Needed   Occupational Therapy Initial " Treatment Plan    Treatment Interventions Self Care / Activities of Daily Living;Adaptive Equipment;Neuro Re-Education / Balance;Therapeutic Exercises;Therapeutic Activity   Treatment Frequency 3 Times per Week   Duration Until Therapy Goals Met   Problem List   Problem List Decreased Homemaking Skills;Decreased Active Daily Living Skills;Decreased Functional Mobility;Decreased Activity Tolerance;Safety Awareness Deficits / Cognition;Impaired Coordination Right Upper Extremity;Impaired Coordination Left Upper Extremity;Impaired Cognitive Function;Impaired Postural Control / Balance

## 2024-05-04 LAB
ANION GAP SERPL CALC-SCNC: 16 MMOL/L (ref 7–16)
BASOPHILS # BLD AUTO: 0 % (ref 0–1.8)
BASOPHILS # BLD: 0 K/UL (ref 0–0.12)
BUN SERPL-MCNC: 13 MG/DL (ref 8–22)
CALCIUM SERPL-MCNC: 8.8 MG/DL (ref 8.5–10.5)
CHLORIDE SERPL-SCNC: 100 MMOL/L (ref 96–112)
CO2 SERPL-SCNC: 16 MMOL/L (ref 20–33)
COMMENT NL1176: NORMAL
CREAT SERPL-MCNC: 0.66 MG/DL (ref 0.5–1.4)
EOSINOPHIL # BLD AUTO: 0 K/UL (ref 0–0.51)
EOSINOPHIL NFR BLD: 0 % (ref 0–6.9)
ERYTHROCYTE [DISTWIDTH] IN BLOOD BY AUTOMATED COUNT: 48.3 FL (ref 35.9–50)
GFR SERPLBLD CREATININE-BSD FMLA CKD-EPI: 99 ML/MIN/1.73 M 2
GLUCOSE SERPL-MCNC: 97 MG/DL (ref 65–99)
GLUCOSE SERPL-MCNC: 98 MG/DL (ref 65–99)
HCT VFR BLD AUTO: 35.1 % (ref 42–52)
HGB BLD-MCNC: 12.4 G/DL (ref 14–18)
LYMPHOCYTES # BLD AUTO: 15.34 K/UL (ref 1–4.8)
LYMPHOCYTES NFR BLD: 63.9 % (ref 22–41)
MAGNESIUM SERPL-MCNC: 1.3 MG/DL (ref 1.5–2.5)
MANUAL DIFF BLD: NORMAL
MCH RBC QN AUTO: 33.1 PG (ref 27–33)
MCHC RBC AUTO-ENTMCNC: 35.3 G/DL (ref 32.3–36.5)
MCV RBC AUTO: 93.6 FL (ref 81.4–97.8)
MONOCYTES # BLD AUTO: 0.19 K/UL (ref 0–0.85)
MONOCYTES NFR BLD AUTO: 0.8 % (ref 0–13.4)
MORPHOLOGY BLD-IMP: NORMAL
NEUTROPHILS # BLD AUTO: 8.47 K/UL (ref 1.82–7.42)
NEUTROPHILS NFR BLD: 35.3 % (ref 44–72)
NRBC # BLD AUTO: 0 K/UL
NRBC BLD-RTO: 0 /100 WBC (ref 0–0.2)
PHOSPHATE SERPL-MCNC: 4.4 MG/DL (ref 2.5–4.5)
PLATELET # BLD AUTO: 230 K/UL (ref 164–446)
PLATELET BLD QL SMEAR: NORMAL
PMV BLD AUTO: 10.8 FL (ref 9–12.9)
POTASSIUM SERPL-SCNC: 4.7 MMOL/L (ref 3.6–5.5)
POTASSIUM SERPL-SCNC: 4.7 MMOL/L (ref 3.6–5.5)
RBC # BLD AUTO: 3.75 M/UL (ref 4.7–6.1)
RBC BLD AUTO: PRESENT
SMUDGE CELLS BLD QL SMEAR: NORMAL
SODIUM SERPL-SCNC: 132 MMOL/L (ref 135–145)
WBC # BLD AUTO: 24 K/UL (ref 4.8–10.8)

## 2024-05-04 PROCEDURE — 84132 ASSAY OF SERUM POTASSIUM: CPT

## 2024-05-04 PROCEDURE — 80048 BASIC METABOLIC PNL TOTAL CA: CPT

## 2024-05-04 PROCEDURE — 84100 ASSAY OF PHOSPHORUS: CPT

## 2024-05-04 PROCEDURE — 700111 HCHG RX REV CODE 636 W/ 250 OVERRIDE (IP): Performed by: HOSPITALIST

## 2024-05-04 PROCEDURE — 85027 COMPLETE CBC AUTOMATED: CPT

## 2024-05-04 PROCEDURE — 700101 HCHG RX REV CODE 250: Performed by: INTERNAL MEDICINE

## 2024-05-04 PROCEDURE — 85007 BL SMEAR W/DIFF WBC COUNT: CPT

## 2024-05-04 PROCEDURE — 700102 HCHG RX REV CODE 250 W/ 637 OVERRIDE(OP): Performed by: INTERNAL MEDICINE

## 2024-05-04 PROCEDURE — 83735 ASSAY OF MAGNESIUM: CPT

## 2024-05-04 PROCEDURE — 36415 COLL VENOUS BLD VENIPUNCTURE: CPT

## 2024-05-04 PROCEDURE — A9270 NON-COVERED ITEM OR SERVICE: HCPCS | Performed by: INTERNAL MEDICINE

## 2024-05-04 PROCEDURE — 99232 SBSQ HOSP IP/OBS MODERATE 35: CPT | Performed by: HOSPITALIST

## 2024-05-04 PROCEDURE — 770006 HCHG ROOM/CARE - MED/SURG/GYN SEMI*

## 2024-05-04 PROCEDURE — 82947 ASSAY GLUCOSE BLOOD QUANT: CPT

## 2024-05-04 RX ORDER — MAGNESIUM SULFATE HEPTAHYDRATE 40 MG/ML
4 INJECTION, SOLUTION INTRAVENOUS ONCE
Status: COMPLETED | OUTPATIENT
Start: 2024-05-04 | End: 2024-05-04

## 2024-05-04 RX ADMIN — OMEPRAZOLE 20 MG: 20 CAPSULE, DELAYED RELEASE ORAL at 04:26

## 2024-05-04 RX ADMIN — LIDOCAINE 1 PATCH: 4 PATCH TOPICAL at 12:18

## 2024-05-04 RX ADMIN — MOMETASONE FUROATE AND FORMOTEROL FUMARATE DIHYDRATE 2 PUFF: 100; 5 AEROSOL RESPIRATORY (INHALATION) at 04:26

## 2024-05-04 RX ADMIN — DIBASIC SODIUM PHOSPHATE, MONOBASIC POTASSIUM PHOSPHATE AND MONOBASIC SODIUM PHOSPHATE 250 MG: 852; 155; 130 TABLET ORAL at 04:25

## 2024-05-04 RX ADMIN — Medication 100 MG: at 15:41

## 2024-05-04 RX ADMIN — OXYCODONE 5 MG: 5 TABLET ORAL at 09:58

## 2024-05-04 RX ADMIN — AMLODIPINE BESYLATE 5 MG: 5 TABLET ORAL at 04:26

## 2024-05-04 RX ADMIN — MAGNESIUM SULFATE HEPTAHYDRATE 4 G: 4 INJECTION, SOLUTION INTRAVENOUS at 15:43

## 2024-05-04 RX ADMIN — TIOTROPIUM BROMIDE INHALATION SPRAY 5 MCG: 3.12 SPRAY, METERED RESPIRATORY (INHALATION) at 04:26

## 2024-05-04 RX ADMIN — DIBASIC SODIUM PHOSPHATE, MONOBASIC POTASSIUM PHOSPHATE AND MONOBASIC SODIUM PHOSPHATE 250 MG: 852; 155; 130 TABLET ORAL at 12:18

## 2024-05-04 ASSESSMENT — ENCOUNTER SYMPTOMS
ORTHOPNEA: 0
MYALGIAS: 0
CHILLS: 0
NAUSEA: 0
DIZZINESS: 0
FEVER: 0
BACK PAIN: 0
VOMITING: 0
SHORTNESS OF BREATH: 0
COUGH: 0
PALPITATIONS: 0
HEMOPTYSIS: 0
ABDOMINAL PAIN: 0
NECK PAIN: 0

## 2024-05-04 ASSESSMENT — PAIN DESCRIPTION - PAIN TYPE
TYPE: ACUTE PAIN

## 2024-05-04 ASSESSMENT — PATIENT HEALTH QUESTIONNAIRE - PHQ9
1. LITTLE INTEREST OR PLEASURE IN DOING THINGS: NOT AT ALL
SUM OF ALL RESPONSES TO PHQ9 QUESTIONS 1 AND 2: 0
2. FEELING DOWN, DEPRESSED, IRRITABLE, OR HOPELESS: NOT AT ALL

## 2024-05-04 ASSESSMENT — FIBROSIS 4 INDEX: FIB4 SCORE: 1.12

## 2024-05-04 NOTE — PROGRESS NOTES
"Assumed care of patient at bedside report from day shift RN. Updated on plan of care.   Patient currently A & O x 4; on room air. Able to turn self in bed; without complaints of acute pain. Assessment completed. Patient's last bowel movement 05/03/24 .   Call light within reach. Hourly rounding and fall precautions in place. Bed locked and in lowest position. All questions answered. No other needs indicated at this time.    Most recent vital signs  /59   Pulse 78   Temp 35.9 °C (96.6 °F) (Temporal)   Resp 16   Ht 1.651 m (5' 5\")   Wt 51.4 kg (113 lb 5.1 oz)   SpO2 96%   BMI 18.86 kg/m²     "

## 2024-05-04 NOTE — CARE PLAN
The patient is Stable - Low risk of patient condition declining or worsening    Shift Goals  Clinical Goals: patient safety and compliance  Patient Goals: to sleep throughout the night  Family Goals: PREETI      Problem: Knowledge Deficit - Standard  Goal: Patient and family/care givers will demonstrate understanding of plan of care, disease process/condition, diagnostic tests and medications  Outcome: Progressing  Note: Patient will demonstrate an understanding for the purpose of medications prior to administration.     Problem: Skin Integrity  Goal: Skin integrity is maintained or improved  Outcome: Progressing  Note: Patient will allow RN or care team to reposition patient every 2 hours throughout the shift.

## 2024-05-04 NOTE — PROGRESS NOTES
Hospital Medicine Daily Progress Note    Date of Service  5/4/2024    Chief Complaint  Ady Blake is a 74 y.o. male admitted 4/26/2024 with fall    Hospital Course:    74-year-old male with history of smoking, alcoholism and hypertension who presented 4/26 with fall. Patient states he is drinking beer every day and smoking half a pack. Patient tripped when he was walking and using cane and fell on his left side, denied any dizziness or lightheadedness, denied any loss of consciousness. Patient lives by himself and usually does all daily activities by himself. Patient was noticed to have cachectic appearance and bruises on his body. Vital signs were stable and patient was on room air. Chest x-ray showed signs of COPD however no signs of pneumonia. CT lumbar, thoracic showed acute compression fracture of T10, CT abdomen showed rib fracture on the left side, seventh, eighth and ninth with diffuse colitis and severe fatty liver disease. Labs showed leukocytosis 17 with elevated liver enzymes ALT 3460 and AST 1279 with total bilirubin 2.1 also patient was found to have bicarb 14 with elevated anion gap and potassium 3.5, IV fluid with CIWA protocol was initiated.  CT head was done and did not show an acute finding.  Patient was very lethargic, CIWA protocol was discontinued to avoid sedation.      Interval Problem Update  -Evaluated examined the patient at bedside, patient is more alert however still confused and having hallucination, continue Ativan as needed and scheduled Seroquel at night.  -Replace electrolytes  -CT head did not show any acute finding, no neurofocal deficit, will hold on MRI for brain at this time.  -Leukocytosis likely related to prednisone  -Discussed with the speech, no dysphagia or risk of aspiration.  - improving on his liver enzymes, holding on MRCP at this time.  -Patient has complicated medical problems and high suspicious for deterioration, consider ICU transfer if needed.  4/30:  Patient seen and examined afebrile, still confused, finished steroid course, today, wbc up to 20 possible related to steroid use   Hypokalemia: potassium of 3.5 repleting   5/1: Patient seen and examined, afebrile MRI done and reviewed no acute issues   Mentation is improving  Hypokalemia: potassium of 3.4 repleting  Hypomagnesemia: repleting   5/2: Patient seen and examined feeling weak, no nausea or vomiting,   Hypokalemia improving  PT/OT eval   Might need placement    5/3: Patient seen and examined, afebrile no nausea or vomiting.  No acute evnts overnight   Awaiting placement   5/4 patient is in bed, no fever or chills, not in distress, no abdominal pain, I have reviewed patient's records, previous hospitalist note reviewed, patient is alert and follows commands, discussed with bedside nurse , charge nurse, . Imaging studies reviewed, laboratory results reviewed.           I have discussed this patient's plan of care and discharge plan at IDT rounds today with Case Management, Nursing, Nursing leadership, and other members of the IDT team.    Consultants/Specialty  None   Code Status  Full Code    Disposition  The patient is medically cleared for discharge to home or a post-acute facility.  Anticipate discharge to: skilled nursing facility    I have placed the appropriate orders for post-discharge needs.    Review of Systems  Review of Systems   Constitutional:  Negative for chills and fever.   Respiratory:  Negative for cough, hemoptysis and shortness of breath.    Cardiovascular:  Negative for chest pain, palpitations and orthopnea.   Gastrointestinal:  Negative for abdominal pain, nausea and vomiting.   Genitourinary:  Negative for dysuria, frequency and urgency.   Musculoskeletal:  Negative for back pain, myalgias and neck pain.   Neurological:  Negative for dizziness.   All other systems reviewed and are negative.       Physical Exam  Temp:  [35.9 °C (96.6 °F)-37.1 °C (98.7 °F)] 37.1 °C (98.7  °F)  Pulse:  [78-83] 82  Resp:  [16-20] 17  BP: (101-127)/(59-77) 120/77  SpO2:  [93 %-96 %] 93 %    Physical Exam  Constitutional:       General: He is not in acute distress.     Appearance: He is ill-appearing (chronic).      Comments: Cachectic appearance   Eyes:      General: No scleral icterus.  Cardiovascular:      Rate and Rhythm: Normal rate.      Heart sounds: No murmur heard.  Pulmonary:      Effort: No respiratory distress.      Breath sounds: No wheezing.   Abdominal:      General: There is no distension.      Tenderness: There is no abdominal tenderness.   Musculoskeletal:      Right lower leg: No edema.      Left lower leg: No edema.   Lymphadenopathy:      Cervical: No cervical adenopathy.   Skin:     Coloration: Skin is not jaundiced.      Findings: Bruising and lesion present. No rash.   Neurological:      General: No focal deficit present.      Mental Status: He is alert. Mental status is at baseline. He is disoriented.      Cranial Nerves: No cranial nerve deficit.      Motor: No weakness.      Gait: Gait normal.         Fluids    Intake/Output Summary (Last 24 hours) at 5/4/2024 1458  Last data filed at 5/4/2024 0900  Gross per 24 hour   Intake --   Output 300 ml   Net -300 ml       Laboratory  Recent Labs     05/02/24  0209 05/03/24  0131 05/04/24  0751   WBC 16.3* 16.0* 24.0*   RBC 3.36* 3.46* 3.75*   HEMOGLOBIN 11.3* 11.3* 12.4*   HEMATOCRIT 32.7* 34.1* 35.1*   MCV 97.3 98.6* 93.6   MCH 33.6* 32.7 33.1*   MCHC 34.6 33.1 35.3   RDW 51.2* 52.5* 48.3   PLATELETCT 208 216 230   MPV 10.4 10.8 10.8     Recent Labs     05/02/24  0209 05/03/24  0131 05/04/24  0717 05/04/24  0751   SODIUM 137 134*  --  132*   POTASSIUM 4.0 4.8 4.7 4.7   CHLORIDE 108 103  --  100   CO2 18* 18*  --  16*   GLUCOSE 117* 117* 97 98   BUN 9 8  --  13   CREATININE 0.66 0.56  --  0.66   CALCIUM 8.1* 9.0  --  8.8                     Imaging  MR-BRAIN-W/O   Final Result      1.  No acute abnormality.   2.  Moderate cerebral  volume loss.      DX-CHEST-LIMITED (1 VIEW)   Final Result         1.  No acute cardiopulmonary disease.   2.  Atherosclerosis      CT-HEAD W/O   Final Result      There is no definite acute intracranial abnormality.         EC-ECHOCARDIOGRAM COMPLETE W/O CONT   Final Result      DX-CHEST-LIMITED (1 VIEW)   Final Result      No acute process.      US-RUQ   Final Result         1.  Dilatation the right renal pelvis, appearance most compatible with mild hydronephrosis.   2.  Echogenic liver, compatible with fatty change versus fibrosis.   3.  Atherosclerosis      CT-LSPINE W/O PLUS RECONS   Final Result      Mild multilevel degenerative disease without definite fracture.      CT-TSPINE W/O PLUS RECONS   Final Result      1.  Acute superior endplate compression fracture of the T10 vertebral body with moderate height loss.      CT-CHEST,ABDOMEN,PELVIS WITH   Final Result      1.  Acute left lateral seventh, eighth, ninth rib fractures.   2.  Moderate superior endplate compression fracture of the T10 vertebral body which appears acute with fracture lines visible.   3.  Ileum enteritis.   4.  Diffuse colitis.   5.  Severe fatty liver infiltration.      DX-CHEST-PORTABLE (1 VIEW)   Final Result      No acute cardiac or pulmonary abnormalities are identified.           Assessment/Plan  * Acute respiratory failure with hypoxia (HCC)- (present on admission)  Assessment & Plan  Patient is on room air  Likely related to COPD  Inhalers    Metabolic encephalopathy  Assessment & Plan  Patient has delirium, multi factors  Likely related to alcoholism, infection, dehydration and hospitalization  CT head did not show any acute finding  Nonpharmacological treatment  Schedule Seroquel at night  Avoid aggressive sedation and avoid antihistamine  Consider EEG or MRI if there is no improving    Rib fracture  Assessment & Plan  Due to falls  No pneumothorax  Pain control  Respiratory hygiene    Debility  Assessment & Plan  Patient has  significant generalized weakness with debility and recurrent falls  PT and OT evaluation  TSH is normal, would likely patient needs placement    Closed fracture of tenth thoracic vertebra (HCC)  Assessment & Plan  From fall  Case was discussed with Dr. Xie neurosurgeon, no intervention needed, brace  Pain control and PT and OT and placement    ACP (advance care planning)  Assessment & Plan  4/26, patient is alert and oriented x 4, discussed the plan of care with the patient, answered all his questions, discussed the CODE STATUS with the patient, patient wants to be full code.  Time 15 minutes    Leukocytosis  Assessment & Plan  Likely related to stress  No signs of infection or fever, holding on antibiotics at this time    Metabolic acidosis  Assessment & Plan  Likely related to alcoholic, starvation and fall with kidney failure  IV fluids gently, improving on his labs  Infection workup possible aspiration pneumonia:     AVA (acute kidney injury) (HCC)  Assessment & Plan  With creatinine 1.6  Likely related to dehydration  IV fluid and labs daily bladder scan to rule out retention  CPK is normal  Resolved    Elevated liver enzymes  Assessment & Plan  Severely elevated enzymes around thousands   Labs daily  Close monitoring and consider steroid if needed  Ultrasound did not show any acute finding except fatty liver  Improving likely related to alcoholic hepatitis  Holding on MRCP at this time.    Malnutrition (HCC)  Assessment & Plan  Cachectic appearance  Nutrition consult  Encourage the patient to quit drinking alcohol and smoking    Fall  Assessment & Plan  Patient has cachectic appearance, multiple bruises on his body  Patient using cane at home, lives by himself  PT and OT  TSH is normal and vitamin D is pending    Tobacco dependence- (present on admission)  Assessment & Plan  Smoking cessation  Nicotine patch    COPD (chronic obstructive pulmonary disease) ([facility])- (present on admission)  Assessment & Plan  No  significant exacerbation  Chest x-ray did not show any signs of infiltrate or or pneumonia  Inhalers,  Pulmonary referral    Alcohol abuse- (present on admission)  Assessment & Plan  Heavy drinking for years  [facility] protocol  High-dose of thiamine and multivitamins  Encouraged the patient to quit    HTN (hypertension)- (present on admission)  Assessment & Plan  Holding blood pressure   Avoid aggressive treatment      VTE prophylaxis: Lovenox    I have performed a physical exam and reviewed and updated ROS and Plan today (5/4/2024). In review of yesterday's note (5/3/2024), there are no changes except as documented above.      My total time spent caring for the patient on the day of the encounter was 45 minutes.   This does not include time spent on separately billable procedures/tests.  Replacing Mg iv. Monitoring for side effects.

## 2024-05-04 NOTE — PROGRESS NOTES
"Patient refusing medication at 2100, stating \"I just want to sleep tonight, don't wake me up again\" Rn educated patient stating that these are the same medications he has been taking nightly. Patient continues to refuse stating \"I'm sorry sweetheart but I am just going to sleep tonight, don't wake me up please\"  "

## 2024-05-04 NOTE — PROGRESS NOTES
While assisting patient to the bathroom, RN offered patient scheduled medication, patient refused. Education provided, and patient continues to refused medications.

## 2024-05-05 LAB
ANION GAP SERPL CALC-SCNC: 14 MMOL/L (ref 7–16)
ANISOCYTOSIS BLD QL SMEAR: ABNORMAL
BASOPHILS # BLD AUTO: 0 % (ref 0–1.8)
BASOPHILS # BLD: 0 K/UL (ref 0–0.12)
BUN SERPL-MCNC: 16 MG/DL (ref 8–22)
CALCIUM SERPL-MCNC: 8.4 MG/DL (ref 8.5–10.5)
CHLORIDE SERPL-SCNC: 97 MMOL/L (ref 96–112)
CO2 SERPL-SCNC: 19 MMOL/L (ref 20–33)
COMMENT NL1176: NORMAL
CREAT SERPL-MCNC: 0.81 MG/DL (ref 0.5–1.4)
EOSINOPHIL # BLD AUTO: 0 K/UL (ref 0–0.51)
EOSINOPHIL NFR BLD: 0 % (ref 0–6.9)
ERYTHROCYTE [DISTWIDTH] IN BLOOD BY AUTOMATED COUNT: 51.6 FL (ref 35.9–50)
GFR SERPLBLD CREATININE-BSD FMLA CKD-EPI: 93 ML/MIN/1.73 M 2
GLUCOSE SERPL-MCNC: 103 MG/DL (ref 65–99)
HCT VFR BLD AUTO: 34.1 % (ref 42–52)
HGB BLD-MCNC: 11.6 G/DL (ref 14–18)
LYMPHOCYTES # BLD AUTO: 9.19 K/UL (ref 1–4.8)
LYMPHOCYTES NFR BLD: 40.5 % (ref 22–41)
MACROCYTES BLD QL SMEAR: ABNORMAL
MAGNESIUM SERPL-MCNC: 2.9 MG/DL (ref 1.5–2.5)
MANUAL DIFF BLD: NORMAL
MCH RBC QN AUTO: 33.3 PG (ref 27–33)
MCHC RBC AUTO-ENTMCNC: 34 G/DL (ref 32.3–36.5)
MCV RBC AUTO: 98 FL (ref 81.4–97.8)
MONOCYTES # BLD AUTO: 0.98 K/UL (ref 0–0.85)
MONOCYTES NFR BLD AUTO: 4.3 % (ref 0–13.4)
MORPHOLOGY BLD-IMP: NORMAL
NEUTROPHILS # BLD AUTO: 12.53 K/UL (ref 1.82–7.42)
NEUTROPHILS NFR BLD: 55.2 % (ref 44–72)
NRBC # BLD AUTO: 0.03 K/UL
NRBC BLD-RTO: 0.1 /100 WBC (ref 0–0.2)
PHOSPHATE SERPL-MCNC: 2.8 MG/DL (ref 2.5–4.5)
PLATELET # BLD AUTO: 287 K/UL (ref 164–446)
PLATELET BLD QL SMEAR: NORMAL
PMV BLD AUTO: 10.8 FL (ref 9–12.9)
POTASSIUM SERPL-SCNC: 4.4 MMOL/L (ref 3.6–5.5)
PROCALCITONIN SERPL-MCNC: 1.01 NG/ML
RBC # BLD AUTO: 3.48 M/UL (ref 4.7–6.1)
RBC BLD AUTO: PRESENT
SMUDGE CELLS BLD QL SMEAR: NORMAL
SODIUM SERPL-SCNC: 130 MMOL/L (ref 135–145)
WBC # BLD AUTO: 22.7 K/UL (ref 4.8–10.8)

## 2024-05-05 PROCEDURE — 700101 HCHG RX REV CODE 250: Performed by: INTERNAL MEDICINE

## 2024-05-05 PROCEDURE — 36415 COLL VENOUS BLD VENIPUNCTURE: CPT

## 2024-05-05 PROCEDURE — 84145 PROCALCITONIN (PCT): CPT

## 2024-05-05 PROCEDURE — 85027 COMPLETE CBC AUTOMATED: CPT

## 2024-05-05 PROCEDURE — 85007 BL SMEAR W/DIFF WBC COUNT: CPT

## 2024-05-05 PROCEDURE — 83735 ASSAY OF MAGNESIUM: CPT

## 2024-05-05 PROCEDURE — A9270 NON-COVERED ITEM OR SERVICE: HCPCS | Performed by: INTERNAL MEDICINE

## 2024-05-05 PROCEDURE — 84100 ASSAY OF PHOSPHORUS: CPT

## 2024-05-05 PROCEDURE — 99232 SBSQ HOSP IP/OBS MODERATE 35: CPT | Performed by: HOSPITALIST

## 2024-05-05 PROCEDURE — 80048 BASIC METABOLIC PNL TOTAL CA: CPT

## 2024-05-05 PROCEDURE — 770006 HCHG ROOM/CARE - MED/SURG/GYN SEMI*

## 2024-05-05 PROCEDURE — 700111 HCHG RX REV CODE 636 W/ 250 OVERRIDE (IP): Mod: JZ | Performed by: INTERNAL MEDICINE

## 2024-05-05 PROCEDURE — 700102 HCHG RX REV CODE 250 W/ 637 OVERRIDE(OP): Performed by: INTERNAL MEDICINE

## 2024-05-05 RX ADMIN — OMEPRAZOLE 20 MG: 20 CAPSULE, DELAYED RELEASE ORAL at 05:31

## 2024-05-05 RX ADMIN — ENOXAPARIN SODIUM 40 MG: 100 INJECTION SUBCUTANEOUS at 18:47

## 2024-05-05 RX ADMIN — TIOTROPIUM BROMIDE INHALATION SPRAY 5 MCG: 3.12 SPRAY, METERED RESPIRATORY (INHALATION) at 05:38

## 2024-05-05 RX ADMIN — AMLODIPINE BESYLATE 5 MG: 5 TABLET ORAL at 05:31

## 2024-05-05 RX ADMIN — MOMETASONE FUROATE AND FORMOTEROL FUMARATE DIHYDRATE 2 PUFF: 100; 5 AEROSOL RESPIRATORY (INHALATION) at 18:47

## 2024-05-05 RX ADMIN — QUETIAPINE FUMARATE 12.5 MG: 25 TABLET ORAL at 20:52

## 2024-05-05 RX ADMIN — LIDOCAINE 1 PATCH: 4 PATCH TOPICAL at 13:08

## 2024-05-05 RX ADMIN — Medication 100 MG: at 16:29

## 2024-05-05 RX ADMIN — MOMETASONE FUROATE AND FORMOTEROL FUMARATE DIHYDRATE 2 PUFF: 100; 5 AEROSOL RESPIRATORY (INHALATION) at 05:37

## 2024-05-05 ASSESSMENT — PAIN DESCRIPTION - PAIN TYPE
TYPE: ACUTE PAIN

## 2024-05-05 ASSESSMENT — ENCOUNTER SYMPTOMS
FEVER: 0
HEMOPTYSIS: 0
ABDOMINAL PAIN: 0
NAUSEA: 0
BACK PAIN: 0
ORTHOPNEA: 0
NECK PAIN: 0
PALPITATIONS: 0
DIZZINESS: 0
SHORTNESS OF BREATH: 0
COUGH: 0
MYALGIAS: 0
CHILLS: 0
VOMITING: 0

## 2024-05-05 NOTE — CARE PLAN
The patient is Stable - Low risk of patient condition declining or worsening    Shift Goals  Clinical Goals: Pt will remain free from falls and maintain safety.  Patient Goals: Pt will sleep throughout the night  Family Goals: rest,    Progress made toward(s) clinical / shift goals:  Pt remained free from falls throughout shift and maintained a safe environment.  Pt was able to sleep throughout the night.  Plan is pending SNF placement.    Patient is not progressing towards the following goals:

## 2024-05-05 NOTE — PROGRESS NOTES
Received report and assumed care of patient at change of shift. Patient A&Ox4 , on RA , and reports 0/10 pain at this time. Patients affect has improved this morning. Patient keeps stating he wants to go home, and needs reminding that case management is working on placement. Patient has been pleasant and thanking RN for help to the bathroom and getting back into bed. Patient is ambulating a little better than yesterday. A little unsteady with the front wheel walker, but is able to make it to the bathroom and back to bed before getting tired. Patient assessment completed, bed in lowest position, and call light and personal belongings within reach. Patient expressed no further needs at this time.

## 2024-05-05 NOTE — CARE PLAN
The patient is Stable - Low risk of patient condition declining or worsening    Shift Goals  Clinical Goals: safety, free from falls, pain control  Patient Goals: rest  Family Goals: rest,    Progress made toward(s) clinical / shift goals:  patient will remain free from falls by checking on pt hourly, pain in the right flank was controled with medication per MAR, and patients nutrition intake was about 50%  Problem: Nutrition - Advanced  Goal: Patient will display progressive weight gain toward goal have adequate food and fluid intake by consuming > or equal to 50% of meals and ensure drinks throughout shift.  Outcome: Progressing     Problem: Risk for Aspiration  Goal: Patient's risk for aspiration will be absent or decrease by sitting up while eating and drinking for all meals and med passes and using the coughing and deep breathing technique to clear phlegm through out shift.  Outcome: Progressing     Problem: Fall Risk  Goal: Patient will remain free from falls by calling before getting out of bed and using a front wheel walker. Patient will also use the bedside commode and urinal when patient is feeling tired and weak by end of shift  Outcome: Progressing     Problem: Pain - Standard  Goal: Alleviation of pain or a reduction in pain to the patient’s comfort goal 3/10 by medicating per MAR and using non pharmacological methods to help decrease with pain by end of shift.  Outcome: Progressing       Patient is not progressing towards the following goals:

## 2024-05-05 NOTE — PROGRESS NOTES
"Notified on call hospitalist, Abisai Martinez, of increased WBC from 16 to 24.  Hospitalist stated \"relating to leukocytosis as a stress response.  Possible an infection, not a clear source.  Ok to monitor for now.\"  "

## 2024-05-05 NOTE — PROGRESS NOTES
Received report and assumed care of patient at change of shift. Patient A&Ox4 , on RA , and reports 10/10 pain at this time. Medicated per MAR for pain.Patient assessment completed, bed in lowest position, and call light and personal belongings within reach. Patient expressed no further needs at this time.

## 2024-05-05 NOTE — PROGRESS NOTES
Hospital Medicine Daily Progress Note    Date of Service  5/5/2024    Chief Complaint  Ady Blake is a 74 y.o. male admitted 4/26/2024 with fall    Hospital Course:    74-year-old male with history of smoking, alcoholism and hypertension who presented 4/26 with fall. Patient states he is drinking beer every day and smoking half a pack. Patient tripped when he was walking and using cane and fell on his left side, denied any dizziness or lightheadedness, denied any loss of consciousness. Patient lives by himself and usually does all daily activities by himself. Patient was noticed to have cachectic appearance and bruises on his body. Vital signs were stable and patient was on room air. Chest x-ray showed signs of COPD however no signs of pneumonia. CT lumbar, thoracic showed acute compression fracture of T10, CT abdomen showed rib fracture on the left side, seventh, eighth and ninth with diffuse colitis and severe fatty liver disease. Labs showed leukocytosis 17 with elevated liver enzymes ALT 3460 and AST 1279 with total bilirubin 2.1 also patient was found to have bicarb 14 with elevated anion gap and potassium 3.5, IV fluid with CIWA protocol was initiated.  CT head was done and did not show an acute finding.  Patient was very lethargic, CIWA protocol was discontinued to avoid sedation.      Interval Problem Update  -Evaluated examined the patient at bedside, patient is more alert however still confused and having hallucination, continue Ativan as needed and scheduled Seroquel at night.  -Replace electrolytes  -CT head did not show any acute finding, no neurofocal deficit, will hold on MRI for brain at this time.  -Leukocytosis likely related to prednisone  -Discussed with the speech, no dysphagia or risk of aspiration.  - improving on his liver enzymes, holding on MRCP at this time.  -Patient has complicated medical problems and high suspicious for deterioration, consider ICU transfer if needed.  4/30:  Patient seen and examined afebrile, still confused, finished steroid course, today, wbc up to 20 possible related to steroid use   Hypokalemia: potassium of 3.5 repleting   5/1: Patient seen and examined, afebrile MRI done and reviewed no acute issues   Mentation is improving  Hypokalemia: potassium of 3.4 repleting  Hypomagnesemia: repleting   5/2: Patient seen and examined feeling weak, no nausea or vomiting,   Hypokalemia improving  PT/OT eval   Might need placement    5/3: Patient seen and examined, afebrile no nausea or vomiting.  No acute evnts overnight   Awaiting placement   5/4 patient is in bed, no fever or chills, not in distress, no abdominal pain, I have reviewed patient's records, previous hospitalist note reviewed, patient is alert and follows commands, discussed with bedside nurse , charge nurse, . Imaging studies reviewed, laboratory results reviewed.   5/5 patient is resting in bed, no new complaints, patient had leukocytosis of 22,000 improved from yesterday 24,000, clinically improved, will follow-up procalcitonin level, no diarrhea, sodium is 130, bicarb is 19 improved from yesterday, phosphorus and magnesium within normal limits, procalcitonin is 1.01 which is improved from 70 on admission, no abdominal pain vital signs are stable follow-up CBC BMP in a.m.        I have discussed this patient's plan of care and discharge plan at IDT rounds today with Case Management, Nursing, Nursing leadership, and other members of the IDT team.    Consultants/Specialty  None   Code Status  Full Code    Disposition  The patient is medically cleared for discharge to home or a post-acute facility.  Anticipate discharge to: skilled nursing facility    I have placed the appropriate orders for post-discharge needs.    Review of Systems  Review of Systems   Constitutional:  Negative for chills and fever.   Respiratory:  Negative for cough, hemoptysis and shortness of breath.    Cardiovascular:  Negative  for chest pain, palpitations and orthopnea.   Gastrointestinal:  Negative for abdominal pain, nausea and vomiting.   Genitourinary:  Negative for dysuria, frequency and urgency.   Musculoskeletal:  Negative for back pain, myalgias and neck pain.   Neurological:  Negative for dizziness.   All other systems reviewed and are negative.       Physical Exam  Temp:  [36.2 °C (97.1 °F)-37.7 °C (99.9 °F)] 36.3 °C (97.4 °F)  Pulse:  [69-93] 69  Resp:  [16-19] 18  BP: (102-130)/(59-70) 102/70  SpO2:  [93 %-98 %] 98 %    Physical Exam  Constitutional:       General: He is not in acute distress.     Appearance: He is ill-appearing (chronic).      Comments: Cachectic appearance   Eyes:      General: No scleral icterus.  Cardiovascular:      Rate and Rhythm: Normal rate.      Heart sounds: No murmur heard.  Pulmonary:      Effort: No respiratory distress.      Breath sounds: No wheezing.   Abdominal:      General: There is no distension.      Tenderness: There is no abdominal tenderness.   Musculoskeletal:      Right lower leg: No edema.      Left lower leg: No edema.   Lymphadenopathy:      Cervical: No cervical adenopathy.   Skin:     Coloration: Skin is not jaundiced.      Findings: Bruising and lesion present. No rash.   Neurological:      General: No focal deficit present.      Mental Status: He is alert. Mental status is at baseline. He is disoriented.      Cranial Nerves: No cranial nerve deficit.      Motor: No weakness.      Gait: Gait normal.         Fluids    Intake/Output Summary (Last 24 hours) at 5/5/2024 1421  Last data filed at 5/5/2024 1100  Gross per 24 hour   Intake 120 ml   Output 400 ml   Net -280 ml       Laboratory  Recent Labs     05/03/24  0131 05/04/24  0751 05/05/24  0202   WBC 16.0* 24.0* 22.7*   RBC 3.46* 3.75* 3.48*   HEMOGLOBIN 11.3* 12.4* 11.6*   HEMATOCRIT 34.1* 35.1* 34.1*   MCV 98.6* 93.6 98.0*   MCH 32.7 33.1* 33.3*   MCHC 33.1 35.3 34.0   RDW 52.5* 48.3 51.6*   PLATELETCT 216 230 287   MPV 10.8  10.8 10.8     Recent Labs     05/03/24  0131 05/04/24  0717 05/04/24  0751 05/05/24  0202   SODIUM 134*  --  132* 130*   POTASSIUM 4.8 4.7 4.7 4.4   CHLORIDE 103  --  100 97   CO2 18*  --  16* 19*   GLUCOSE 117* 97 98 103*   BUN 8  --  13 16   CREATININE 0.56  --  0.66 0.81   CALCIUM 9.0  --  8.8 8.4*                     Imaging  MR-BRAIN-W/O   Final Result      1.  No acute abnormality.   2.  Moderate cerebral volume loss.      DX-CHEST-LIMITED (1 VIEW)   Final Result         1.  No acute cardiopulmonary disease.   2.  Atherosclerosis      CT-HEAD W/O   Final Result      There is no definite acute intracranial abnormality.         EC-ECHOCARDIOGRAM COMPLETE W/O CONT   Final Result      DX-CHEST-LIMITED (1 VIEW)   Final Result      No acute process.      US-RUQ   Final Result         1.  Dilatation the right renal pelvis, appearance most compatible with mild hydronephrosis.   2.  Echogenic liver, compatible with fatty change versus fibrosis.   3.  Atherosclerosis      CT-LSPINE W/O PLUS RECONS   Final Result      Mild multilevel degenerative disease without definite fracture.      CT-TSPINE W/O PLUS RECONS   Final Result      1.  Acute superior endplate compression fracture of the T10 vertebral body with moderate height loss.      CT-CHEST,ABDOMEN,PELVIS WITH   Final Result      1.  Acute left lateral seventh, eighth, ninth rib fractures.   2.  Moderate superior endplate compression fracture of the T10 vertebral body which appears acute with fracture lines visible.   3.  Ileum enteritis.   4.  Diffuse colitis.   5.  Severe fatty liver infiltration.      DX-CHEST-PORTABLE (1 VIEW)   Final Result      No acute cardiac or pulmonary abnormalities are identified.           Assessment/Plan  * Acute respiratory failure with hypoxia (HCC)- (present on admission)  Assessment & Plan  Patient is on room air  Likely related to COPD  Inhalers    Metabolic encephalopathy  Assessment & Plan  Patient has delirium, multi  factors  Likely related to alcoholism, infection, dehydration and hospitalization  CT head did not show any acute finding  Nonpharmacological treatment  Schedule Seroquel at night  Avoid aggressive sedation and avoid antihistamine  Consider EEG or MRI if there is no improving    Rib fracture  Assessment & Plan  Due to falls  No pneumothorax  Pain control  Respiratory hygiene    Debility  Assessment & Plan  Patient has significant generalized weakness with debility and recurrent falls  PT and OT evaluation  TSH is normal, would likely patient needs placement    Closed fracture of tenth thoracic vertebra (HCC)  Assessment & Plan  From fall  Case was discussed with Dr. Xie neurosurgeon, no intervention needed, brace  Pain control and PT and OT and placement    ACP (advance care planning)  Assessment & Plan  4/26, patient is alert and oriented x 4, discussed the plan of care with the patient, answered all his questions, discussed the CODE STATUS with the patient, patient wants to be full code.  Time 15 minutes    Leukocytosis  Assessment & Plan  Likely related to stress  No signs of infection or fever, holding on antibiotics at this time    Metabolic acidosis  Assessment & Plan  Likely related to alcoholic, starvation and fall with kidney failure  IV fluids gently, improving on his labs  Infection workup possible aspiration pneumonia:     AVA (acute kidney injury) (HCC)  Assessment & Plan  With creatinine 1.6  Likely related to dehydration  IV fluid and labs daily bladder scan to rule out retention  CPK is normal  Resolved    Elevated liver enzymes  Assessment & Plan  Severely elevated enzymes around thousands   Labs daily  Close monitoring and consider steroid if needed  Ultrasound did not show any acute finding except fatty liver  Improving likely related to alcoholic hepatitis  Holding on MRCP at this time.    Malnutrition (HCC)  Assessment & Plan  Cachectic appearance  Nutrition consult  Encourage the patient to  quit drinking alcohol and smoking    Fall  Assessment & Plan  Patient has cachectic appearance, multiple bruises on his body  Patient using cane at home, lives by himself  PT and OT  TSH is normal and vitamin D is pending    Tobacco dependence- (present on admission)  Assessment & Plan  Smoking cessation  Nicotine patch    COPD (chronic obstructive pulmonary disease) (HCC)- (present on admission)  Assessment & Plan  No significant exacerbation  Chest x-ray did not show any signs of infiltrate or or pneumonia  Inhalers,  Pulmonary referral    Alcohol abuse- (present on admission)  Assessment & Plan  Heavy drinking for years  CIWA protocol  High-dose of thiamine and multivitamins  Encouraged the patient to quit    HTN (hypertension)- (present on admission)  Assessment & Plan  Holding blood pressure   Avoid aggressive treatment      VTE prophylaxis: Lovenox    I have performed a physical exam and reviewed and updated ROS and Plan today (5/5/2024). In review of yesterday's note (5/4/2024), there are no changes except as documented above.

## 2024-05-05 NOTE — PROGRESS NOTES
Received report and assumed care of patient at change of shift. Patient is A&Ox4, on RA, and reports no pain at this time. Pt refused medications and received from education.  Pt verbally acknowledged and continued to refuse. Patient assessment completed, bed in lowest position, and call light and personal belongings are within reach. Patient expressed no further needs at this time.   I will STOP taking the medications listed below when I get home from the hospital:  None

## 2024-05-05 NOTE — CARE PLAN
The patient is Stable - Low risk of patient condition declining or worsening    Shift Goals  Clinical Goals: free from falls, increase nutritional intake to at  least 50% of meals, q2 turns  Patient Goals: Rest  Family Goals: placement    Progress made toward(s) clinical / shift goals:  patient is more stable while ambulating to the bathroom. He does get tired but he is able to get back to bed. Patient refused breakfast and had one bite of lunch, but did drink 3 ensures and had a fruit cup. Patient had to be reminded to keep turning every 2 hours, and after some prompting by RN and Son (when he was visiting), patient complied and education upon the importance was given every time    Problem: Nutrition - Advanced  Goal: Patient will display progressive weight gain toward goal have adequate food and fluid intake by consuming and finishing n ensure with every meal throughout the shift  Outcome: Progressing     Problem: Ineffective Airway Clearance  Goal: Patient will maintain patent airway with clear/clearing breath sounds and coughing and deep breathing when feeling short of breath after ambulating throughout shift  Outcome: Progressing     Problem: Risk for Aspiration  Goal: Patient's risk for aspiration will be absent or decrease by sitting up to eat, drink or take medications throughout shift  Outcome: Progressing     Problem: Skin Integrity  Goal: Skin integrity is maintained or improved by continuing q2 turns by end of shift  Outcome: Progressing     Problem: Fall Risk  Goal: Patient will remain free from falls by calling for assistance when needing to get out of bed, using a front wheel walker and wearing threaded socks while up ambulating throughout shift  Outcome: Progressing     Problem: Pain - Standard  Goal: Alleviation of pain or a reduction in pain to the patient’s comfort goal of 0/10 by using lidocaine patches and pharmacological means if pain becomes above a 3  Outcome: Progressing       Patient is not  progressing towards the following goals:

## 2024-05-06 LAB
ANION GAP SERPL CALC-SCNC: 15 MMOL/L (ref 7–16)
BUN SERPL-MCNC: 16 MG/DL (ref 8–22)
CALCIUM SERPL-MCNC: 8.9 MG/DL (ref 8.5–10.5)
CHLORIDE SERPL-SCNC: 97 MMOL/L (ref 96–112)
CO2 SERPL-SCNC: 19 MMOL/L (ref 20–33)
CREAT SERPL-MCNC: 0.93 MG/DL (ref 0.5–1.4)
ERYTHROCYTE [DISTWIDTH] IN BLOOD BY AUTOMATED COUNT: 50.6 FL (ref 35.9–50)
GFR SERPLBLD CREATININE-BSD FMLA CKD-EPI: 87 ML/MIN/1.73 M 2
GLUCOSE SERPL-MCNC: 96 MG/DL (ref 65–99)
HCT VFR BLD AUTO: 35.4 % (ref 42–52)
HGB BLD-MCNC: 11.7 G/DL (ref 14–18)
MAGNESIUM SERPL-MCNC: 2 MG/DL (ref 1.5–2.5)
MCH RBC QN AUTO: 32.7 PG (ref 27–33)
MCHC RBC AUTO-ENTMCNC: 33.1 G/DL (ref 32.3–36.5)
MCV RBC AUTO: 98.9 FL (ref 81.4–97.8)
PHOSPHATE SERPL-MCNC: 2.4 MG/DL (ref 2.5–4.5)
PLATELET # BLD AUTO: 312 K/UL (ref 164–446)
PMV BLD AUTO: 10.6 FL (ref 9–12.9)
POTASSIUM SERPL-SCNC: 4 MMOL/L (ref 3.6–5.5)
RBC # BLD AUTO: 3.58 M/UL (ref 4.7–6.1)
SODIUM SERPL-SCNC: 131 MMOL/L (ref 135–145)
WBC # BLD AUTO: 18.5 K/UL (ref 4.8–10.8)

## 2024-05-06 PROCEDURE — 80048 BASIC METABOLIC PNL TOTAL CA: CPT

## 2024-05-06 PROCEDURE — 85027 COMPLETE CBC AUTOMATED: CPT

## 2024-05-06 PROCEDURE — 700102 HCHG RX REV CODE 250 W/ 637 OVERRIDE(OP): Performed by: HOSPITALIST

## 2024-05-06 PROCEDURE — 84100 ASSAY OF PHOSPHORUS: CPT

## 2024-05-06 PROCEDURE — 99232 SBSQ HOSP IP/OBS MODERATE 35: CPT | Performed by: HOSPITALIST

## 2024-05-06 PROCEDURE — 700111 HCHG RX REV CODE 636 W/ 250 OVERRIDE (IP): Performed by: INTERNAL MEDICINE

## 2024-05-06 PROCEDURE — 83735 ASSAY OF MAGNESIUM: CPT

## 2024-05-06 PROCEDURE — 36415 COLL VENOUS BLD VENIPUNCTURE: CPT

## 2024-05-06 PROCEDURE — 700101 HCHG RX REV CODE 250: Performed by: INTERNAL MEDICINE

## 2024-05-06 PROCEDURE — 700102 HCHG RX REV CODE 250 W/ 637 OVERRIDE(OP): Performed by: INTERNAL MEDICINE

## 2024-05-06 PROCEDURE — 770001 HCHG ROOM/CARE - MED/SURG/GYN PRIV*

## 2024-05-06 PROCEDURE — A9270 NON-COVERED ITEM OR SERVICE: HCPCS | Performed by: INTERNAL MEDICINE

## 2024-05-06 PROCEDURE — A9270 NON-COVERED ITEM OR SERVICE: HCPCS | Performed by: HOSPITALIST

## 2024-05-06 RX ADMIN — QUETIAPINE FUMARATE 12.5 MG: 25 TABLET ORAL at 19:59

## 2024-05-06 RX ADMIN — DIBASIC SODIUM PHOSPHATE, MONOBASIC POTASSIUM PHOSPHATE AND MONOBASIC SODIUM PHOSPHATE 250 MG: 852; 155; 130 TABLET ORAL at 19:59

## 2024-05-06 RX ADMIN — TIOTROPIUM BROMIDE INHALATION SPRAY 5 MCG: 3.12 SPRAY, METERED RESPIRATORY (INHALATION) at 06:00

## 2024-05-06 RX ADMIN — DIBASIC SODIUM PHOSPHATE, MONOBASIC POTASSIUM PHOSPHATE AND MONOBASIC SODIUM PHOSPHATE 250 MG: 852; 155; 130 TABLET ORAL at 08:02

## 2024-05-06 RX ADMIN — LIDOCAINE 1 PATCH: 4 PATCH TOPICAL at 11:30

## 2024-05-06 RX ADMIN — LORAZEPAM 0.5 MG: 2 INJECTION INTRAMUSCULAR; INTRAVENOUS at 23:52

## 2024-05-06 RX ADMIN — DIBASIC SODIUM PHOSPHATE, MONOBASIC POTASSIUM PHOSPHATE AND MONOBASIC SODIUM PHOSPHATE 250 MG: 852; 155; 130 TABLET ORAL at 23:52

## 2024-05-06 RX ADMIN — MOMETASONE FUROATE AND FORMOTEROL FUMARATE DIHYDRATE 2 PUFF: 100; 5 AEROSOL RESPIRATORY (INHALATION) at 06:00

## 2024-05-06 ASSESSMENT — ENCOUNTER SYMPTOMS
FEVER: 0
DIZZINESS: 0
CHILLS: 0
MYALGIAS: 0
HEMOPTYSIS: 0
BACK PAIN: 0
COUGH: 0
ABDOMINAL PAIN: 0
PALPITATIONS: 0
ORTHOPNEA: 0
NAUSEA: 0
VOMITING: 0
NECK PAIN: 0
SHORTNESS OF BREATH: 0

## 2024-05-06 ASSESSMENT — PAIN DESCRIPTION - PAIN TYPE
TYPE: ACUTE PAIN

## 2024-05-06 ASSESSMENT — LIFESTYLE VARIABLES
AVERAGE NUMBER OF DAYS PER WEEK YOU HAVE A DRINK CONTAINING ALCOHOL: 1
TOTAL SCORE: 0
TOTAL SCORE: 0
ON A TYPICAL DAY WHEN YOU DRINK ALCOHOL HOW MANY DRINKS DO YOU HAVE: 1
HOW MANY TIMES IN THE PAST YEAR HAVE YOU HAD 5 OR MORE DRINKS IN A DAY: 0
HAVE PEOPLE ANNOYED YOU BY CRITICIZING YOUR DRINKING: NO
TOTAL SCORE: 0
EVER FELT BAD OR GUILTY ABOUT YOUR DRINKING: NO
CONSUMPTION TOTAL: NEGATIVE
ALCOHOL_USE: YES
HAVE YOU EVER FELT YOU SHOULD CUT DOWN ON YOUR DRINKING: NO
DOES PATIENT WANT TO STOP DRINKING: NO
EVER HAD A DRINK FIRST THING IN THE MORNING TO STEADY YOUR NERVES TO GET RID OF A HANGOVER: NO

## 2024-05-06 ASSESSMENT — PATIENT HEALTH QUESTIONNAIRE - PHQ9
1. LITTLE INTEREST OR PLEASURE IN DOING THINGS: NOT AT ALL
2. FEELING DOWN, DEPRESSED, IRRITABLE, OR HOPELESS: NOT AT ALL
SUM OF ALL RESPONSES TO PHQ9 QUESTIONS 1 AND 2: 0

## 2024-05-06 NOTE — PROGRESS NOTES
"Assumed care of pt  at 0700. Report received from NOC RN. Pt is A&O x 3 disoriented to situation. Patient stated that their pain is 0/10. Pt's pain comfort goal is 0/10. Pt refused their assessment this AM, stating \"when can I leave\". Updated pt on POC with education on contact precautions for c diff. Pt then dismissed this RN. Pt is on RA and is anxious to leave.   Pt educated on how to use the call light, pt verbalized understanding. Bed in lowest locked position, call light within reach, hourly rounding in place. Labs reviewed, orders reviewed, communication board updated. Pt declines any further needs at this time     "

## 2024-05-06 NOTE — CARE PLAN
The patient is Stable - Low risk of patient condition declining or worsening    Shift Goals  Clinical Goals: Compliance  Patient Goals: DIscharge  Family Goals: PREETI    Progress made toward(s) clinical / shift goals:  Patient educated on plan of care and verbalized understanding. Patient has remained free of falls this shift with appropriate fall precautions in place thorughout shift. Patient skin integrity maintained throughout shift. Pt educated on the importance of medication and care compliance.     Problem: Knowledge Deficit - Standard  Goal: Patient and family/care givers will demonstrate understanding of plan of care, disease process/condition, diagnostic tests and medications  Outcome: Progressing     Problem: Fall Risk  Goal: Patient will remain free from falls  Outcome: Progressing     Patient is not progressing towards the following goals:

## 2024-05-06 NOTE — CARE PLAN
The patient is Stable - Low risk of patient condition declining or worsening    Shift Goals  Clinical Goals: remain free from falls  Patient Goals: go home in the morning  Family Goals: placement    Progress made toward(s) clinical / shift goals:  pt remained free from falls, bed alarm secured, rounded hourly, kept bed low and locked position call bell within reach     Patient is not progressing towards the following goals:

## 2024-05-06 NOTE — PROGRESS NOTES
Hospital Medicine Daily Progress Note    Date of Service  5/6/2024    Chief Complaint  Ady Blake is a 74 y.o. male admitted 4/26/2024 with fall    Hospital Course:    74-year-old male with history of smoking, alcoholism and hypertension who presented 4/26 with fall. Patient states he is drinking beer every day and smoking half a pack. Patient tripped when he was walking and using cane and fell on his left side, denied any dizziness or lightheadedness, denied any loss of consciousness. Patient lives by himself and usually does all daily activities by himself. Patient was noticed to have cachectic appearance and bruises on his body. Vital signs were stable and patient was on room air. Chest x-ray showed signs of COPD however no signs of pneumonia. CT lumbar, thoracic showed acute compression fracture of T10, CT abdomen showed rib fracture on the left side, seventh, eighth and ninth with diffuse colitis and severe fatty liver disease. Labs showed leukocytosis 17 with elevated liver enzymes ALT 3460 and AST 1279 with total bilirubin 2.1 also patient was found to have bicarb 14 with elevated anion gap and potassium 3.5, IV fluid with CIWA protocol was initiated.  CT head was done and did not show an acute finding.  Patient was very lethargic, CIWA protocol was discontinued to avoid sedation.      Interval Problem Update  -Evaluated examined the patient at bedside, patient is more alert however still confused and having hallucination, continue Ativan as needed and scheduled Seroquel at night.  -Replace electrolytes  -CT head did not show any acute finding, no neurofocal deficit, will hold on MRI for brain at this time.  -Leukocytosis likely related to prednisone  -Discussed with the speech, no dysphagia or risk of aspiration.  - improving on his liver enzymes, holding on MRCP at this time.  -Patient has complicated medical problems and high suspicious for deterioration, consider ICU transfer if needed.  4/30:  Patient seen and examined afebrile, still confused, finished steroid course, today, wbc up to 20 possible related to steroid use   Hypokalemia: potassium of 3.5 repleting   5/1: Patient seen and examined, afebrile MRI done and reviewed no acute issues   Mentation is improving  Hypokalemia: potassium of 3.4 repleting  Hypomagnesemia: repleting   5/2: Patient seen and examined feeling weak, no nausea or vomiting,   Hypokalemia improving  PT/OT eval   Might need placement    5/3: Patient seen and examined, afebrile no nausea or vomiting.  No acute evnts overnight   Awaiting placement   5/4 patient is in bed, no fever or chills, not in distress, no abdominal pain, I have reviewed patient's records, previous hospitalist note reviewed, patient is alert and follows commands, discussed with bedside nurse , charge nurse, . Imaging studies reviewed, laboratory results reviewed.   5/5 patient is resting in bed, no new complaints, patient had leukocytosis of 22,000 improved from yesterday 24,000, clinically improved, will follow-up procalcitonin level, no diarrhea, sodium is 130, bicarb is 19 improved from yesterday, phosphorus and magnesium within normal limits, procalcitonin is 1.01 which is improved from 70 on admission, no abdominal pain vital signs are stable follow-up CBC BMP in a.m.  5/6 patient is resting in bed, he is on room air, he is alert oriented follows commands, he is tolerating diet, I have reviewed CBC, leukocytosis is trending down, no fevers no chills, discussed with bedside nurse charge nurse , pending safe discharge plan, follow-up with       I have discussed this patient's plan of care and discharge plan at IDT rounds today with Case Management, Nursing, Nursing leadership, and other members of the IDT team.    Consultants/Specialty  None   Code Status  Full Code    Disposition  The patient is medically cleared for discharge to home or a post-acute facility.  Anticipate  discharge to: skilled nursing facility    I have placed the appropriate orders for post-discharge needs.    Review of Systems  Review of Systems   Constitutional:  Negative for chills and fever.   Respiratory:  Negative for cough, hemoptysis and shortness of breath.    Cardiovascular:  Negative for chest pain, palpitations and orthopnea.   Gastrointestinal:  Negative for abdominal pain, nausea and vomiting.   Genitourinary:  Negative for dysuria, frequency and urgency.   Musculoskeletal:  Negative for back pain, myalgias and neck pain.   Neurological:  Negative for dizziness.   All other systems reviewed and are negative.       Physical Exam  Temp:  [36.6 °C (97.9 °F)-37.1 °C (98.7 °F)] 37.1 °C (98.7 °F)  Pulse:  [86-98] 90  Resp:  [18-19] 18  BP: ()/(53-58) 112/56  SpO2:  [91 %-98 %] 91 %    Physical Exam  Constitutional:       General: He is not in acute distress.     Appearance: He is ill-appearing (chronic).      Comments: Cachectic appearance   Eyes:      General: No scleral icterus.  Cardiovascular:      Rate and Rhythm: Normal rate.      Heart sounds: No murmur heard.  Pulmonary:      Effort: No respiratory distress.      Breath sounds: No wheezing.   Abdominal:      General: There is no distension.      Tenderness: There is no abdominal tenderness.   Musculoskeletal:      Right lower leg: No edema.      Left lower leg: No edema.   Lymphadenopathy:      Cervical: No cervical adenopathy.   Skin:     Coloration: Skin is not jaundiced.      Findings: Bruising and lesion present. No rash.   Neurological:      General: No focal deficit present.      Mental Status: He is alert. Mental status is at baseline. He is disoriented.      Cranial Nerves: No cranial nerve deficit.      Motor: No weakness.      Gait: Gait normal.         Fluids    Intake/Output Summary (Last 24 hours) at 5/6/2024 1344  Last data filed at 5/6/2024 0741  Gross per 24 hour   Intake 0 ml   Output 50 ml   Net -50 ml       Laboratory  Recent  Labs     05/04/24 0751 05/05/24  0202 05/06/24  0135   WBC 24.0* 22.7* 18.5*   RBC 3.75* 3.48* 3.58*   HEMOGLOBIN 12.4* 11.6* 11.7*   HEMATOCRIT 35.1* 34.1* 35.4*   MCV 93.6 98.0* 98.9*   MCH 33.1* 33.3* 32.7   MCHC 35.3 34.0 33.1   RDW 48.3 51.6* 50.6*   PLATELETCT 230 287 312   MPV 10.8 10.8 10.6     Recent Labs     05/04/24 0751 05/05/24  0202 05/06/24  0135   SODIUM 132* 130* 131*   POTASSIUM 4.7 4.4 4.0   CHLORIDE 100 97 97   CO2 16* 19* 19*   GLUCOSE 98 103* 96   BUN 13 16 16   CREATININE 0.66 0.81 0.93   CALCIUM 8.8 8.4* 8.9                     Imaging  MR-BRAIN-W/O   Final Result      1.  No acute abnormality.   2.  Moderate cerebral volume loss.      DX-CHEST-LIMITED (1 VIEW)   Final Result         1.  No acute cardiopulmonary disease.   2.  Atherosclerosis      CT-HEAD W/O   Final Result      There is no definite acute intracranial abnormality.         EC-ECHOCARDIOGRAM COMPLETE W/O CONT   Final Result      DX-CHEST-LIMITED (1 VIEW)   Final Result      No acute process.      US-RUQ   Final Result         1.  Dilatation the right renal pelvis, appearance most compatible with mild hydronephrosis.   2.  Echogenic liver, compatible with fatty change versus fibrosis.   3.  Atherosclerosis      CT-LSPINE W/O PLUS RECONS   Final Result      Mild multilevel degenerative disease without definite fracture.      CT-TSPINE W/O PLUS RECONS   Final Result      1.  Acute superior endplate compression fracture of the T10 vertebral body with moderate height loss.      CT-CHEST,ABDOMEN,PELVIS WITH   Final Result      1.  Acute left lateral seventh, eighth, ninth rib fractures.   2.  Moderate superior endplate compression fracture of the T10 vertebral body which appears acute with fracture lines visible.   3.  Ileum enteritis.   4.  Diffuse colitis.   5.  Severe fatty liver infiltration.      DX-CHEST-PORTABLE (1 VIEW)   Final Result      No acute cardiac or pulmonary abnormalities are identified.            Assessment/Plan  * Acute respiratory failure with hypoxia (HCC)- (present on admission)  Assessment & Plan  Patient is on room air  Likely related to COPD  Inhalers    Metabolic encephalopathy  Assessment & Plan  Patient has delirium, multi factors  Likely related to alcoholism, infection, dehydration and hospitalization  CT head did not show any acute finding  Nonpharmacological treatment  Schedule Seroquel at night  Avoid aggressive sedation and avoid antihistamine  Consider EEG or MRI if there is no improving    Rib fracture  Assessment & Plan  Due to falls  No pneumothorax  Pain control  Respiratory hygiene    Debility  Assessment & Plan  Patient has significant generalized weakness with debility and recurrent falls  PT and OT evaluation  TSH is normal, would likely patient needs placement    Closed fracture of tenth thoracic vertebra (HCC)  Assessment & Plan  From fall  Case was discussed with Dr. Xie neurosurgeon, no intervention needed, brace  Pain control and PT and OT and placement    ACP (advance care planning)  Assessment & Plan  4/26, patient is alert and oriented x 4, discussed the plan of care with the patient, answered all his questions, discussed the CODE STATUS with the patient, patient wants to be full code.  Time 15 minutes    Leukocytosis  Assessment & Plan  Likely related to stress  No signs of infection or fever, holding on antibiotics at this time    Metabolic acidosis  Assessment & Plan  Likely related to alcoholic, starvation and fall with kidney failure  IV fluids gently, improving on his labs  Infection workup possible aspiration pneumonia:     AVA (acute kidney injury) (HCC)  Assessment & Plan  With creatinine 1.6  Likely related to dehydration  IV fluid and labs daily bladder scan to rule out retention  CPK is normal  Resolved    Elevated liver enzymes  Assessment & Plan  Severely elevated enzymes around thousands   Labs daily  Close monitoring and consider steroid if  needed  Ultrasound did not show any acute finding except fatty liver  Improving likely related to alcoholic hepatitis  Holding on MRCP at this time.    Malnutrition (HCC)  Assessment & Plan  Cachectic appearance  Nutrition consult  Encourage the patient to quit drinking alcohol and smoking    Fall  Assessment & Plan  Patient has cachectic appearance, multiple bruises on his body  Patient using cane at home, lives by himself  PT and OT  TSH is normal and vitamin D is pending    Tobacco dependence- (present on admission)  Assessment & Plan  Smoking cessation  Nicotine patch    COPD (chronic obstructive pulmonary disease) (HCC)- (present on admission)  Assessment & Plan  No significant exacerbation  Chest x-ray did not show any signs of infiltrate or or pneumonia  Inhalers,  Pulmonary referral    Alcohol abuse- (present on admission)  Assessment & Plan  Heavy drinking for years  CIWA protocol  High-dose of thiamine and multivitamins  Encouraged the patient to quit    HTN (hypertension)- (present on admission)  Assessment & Plan  Holding blood pressure   Avoid aggressive treatment      VTE prophylaxis: Lovenox    I have performed a physical exam and reviewed and updated ROS and Plan today (5/6/2024). In review of yesterday's note (5/5/2024), there are no changes except as documented above.      My total time spent caring for the patient on the day of the encounter was 36 minutes.   This does not include time spent on separately billable procedures/tests.

## 2024-05-07 ENCOUNTER — PATIENT OUTREACH (OUTPATIENT)
Dept: SCHEDULING | Facility: IMAGING CENTER | Age: 74
End: 2024-05-07
Payer: MEDICARE

## 2024-05-07 PROBLEM — Z71.89 ACP (ADVANCE CARE PLANNING): Status: RESOLVED | Noted: 2024-04-26 | Resolved: 2024-05-07

## 2024-05-07 LAB
ALBUMIN SERPL BCP-MCNC: 3.1 G/DL (ref 3.2–4.9)
ALP SERPL-CCNC: 99 U/L (ref 30–99)
ALT SERPL-CCNC: 88 U/L (ref 2–50)
AST SERPL-CCNC: 52 U/L (ref 12–45)
BILIRUB CONJ SERPL-MCNC: 0.3 MG/DL (ref 0.1–0.5)
BILIRUB INDIRECT SERPL-MCNC: 0.5 MG/DL (ref 0–1)
BILIRUB SERPL-MCNC: 0.8 MG/DL (ref 0.1–1.5)
ERYTHROCYTE [DISTWIDTH] IN BLOOD BY AUTOMATED COUNT: 50.3 FL (ref 35.9–50)
HCT VFR BLD AUTO: 33.4 % (ref 42–52)
HGB BLD-MCNC: 11 G/DL (ref 14–18)
MAGNESIUM SERPL-MCNC: 1.6 MG/DL (ref 1.5–2.5)
MCH RBC QN AUTO: 32.7 PG (ref 27–33)
MCHC RBC AUTO-ENTMCNC: 32.9 G/DL (ref 32.3–36.5)
MCV RBC AUTO: 99.4 FL (ref 81.4–97.8)
PHOSPHATE SERPL-MCNC: 3.4 MG/DL (ref 2.5–4.5)
PLATELET # BLD AUTO: 296 K/UL (ref 164–446)
PMV BLD AUTO: 10.4 FL (ref 9–12.9)
PROCALCITONIN SERPL-MCNC: 0.49 NG/ML
PROT SERPL-MCNC: 5.8 G/DL (ref 6–8.2)
RBC # BLD AUTO: 3.36 M/UL (ref 4.7–6.1)
WBC # BLD AUTO: 13.4 K/UL (ref 4.8–10.8)

## 2024-05-07 PROCEDURE — RXMED WILLOW AMBULATORY MEDICATION CHARGE: Performed by: HOSPITALIST

## 2024-05-07 PROCEDURE — 84145 PROCALCITONIN (PCT): CPT

## 2024-05-07 PROCEDURE — 700102 HCHG RX REV CODE 250 W/ 637 OVERRIDE(OP): Performed by: INTERNAL MEDICINE

## 2024-05-07 PROCEDURE — 83735 ASSAY OF MAGNESIUM: CPT

## 2024-05-07 PROCEDURE — A9270 NON-COVERED ITEM OR SERVICE: HCPCS | Performed by: INTERNAL MEDICINE

## 2024-05-07 PROCEDURE — 99233 SBSQ HOSP IP/OBS HIGH 50: CPT | Performed by: HOSPITALIST

## 2024-05-07 PROCEDURE — 700111 HCHG RX REV CODE 636 W/ 250 OVERRIDE (IP): Mod: JZ | Performed by: HOSPITALIST

## 2024-05-07 PROCEDURE — 80076 HEPATIC FUNCTION PANEL: CPT

## 2024-05-07 PROCEDURE — 770001 HCHG ROOM/CARE - MED/SURG/GYN PRIV*

## 2024-05-07 PROCEDURE — 700111 HCHG RX REV CODE 636 W/ 250 OVERRIDE (IP): Mod: JZ | Performed by: INTERNAL MEDICINE

## 2024-05-07 PROCEDURE — 84100 ASSAY OF PHOSPHORUS: CPT

## 2024-05-07 PROCEDURE — 85027 COMPLETE CBC AUTOMATED: CPT

## 2024-05-07 PROCEDURE — 36415 COLL VENOUS BLD VENIPUNCTURE: CPT

## 2024-05-07 RX ORDER — AMLODIPINE BESYLATE 5 MG/1
5 TABLET ORAL DAILY
Qty: 30 TABLET | Refills: 0 | Status: SHIPPED | OUTPATIENT
Start: 2024-05-07

## 2024-05-07 RX ORDER — OMEPRAZOLE 20 MG/1
20 CAPSULE, DELAYED RELEASE ORAL DAILY
Qty: 30 CAPSULE | Refills: 0 | Status: SHIPPED | OUTPATIENT
Start: 2024-05-08

## 2024-05-07 RX ORDER — QUETIAPINE FUMARATE 25 MG/1
12.5 TABLET, FILM COATED ORAL NIGHTLY
Qty: 15 TABLET | Refills: 0 | Status: SHIPPED | OUTPATIENT
Start: 2024-05-07 | End: 2024-06-07

## 2024-05-07 RX ORDER — AMOXICILLIN AND CLAVULANATE POTASSIUM 875; 125 MG/1; MG/1
1 TABLET, FILM COATED ORAL EVERY 12 HOURS
Status: DISCONTINUED | OUTPATIENT
Start: 2024-05-07 | End: 2024-05-07

## 2024-05-07 RX ORDER — BUDESONIDE AND FORMOTEROL FUMARATE DIHYDRATE 160; 4.5 UG/1; UG/1
2 AEROSOL RESPIRATORY (INHALATION) 2 TIMES DAILY
Qty: 10.2 G | Refills: 0 | Status: SHIPPED | OUTPATIENT
Start: 2024-05-07 | End: 2024-06-07

## 2024-05-07 RX ORDER — OXYCODONE HYDROCHLORIDE 5 MG/1
5 TABLET ORAL EVERY 8 HOURS PRN
Qty: 15 TABLET | Refills: 0 | Status: SHIPPED | OUTPATIENT
Start: 2024-05-07 | End: 2024-05-13

## 2024-05-07 RX ORDER — LANOLIN ALCOHOL/MO/W.PET/CERES
100 CREAM (GRAM) TOPICAL DAILY
Qty: 30 TABLET | Refills: 0 | Status: SHIPPED | OUTPATIENT
Start: 2024-05-07

## 2024-05-07 RX ORDER — LIDOCAINE 4 G/G
2 PATCH TOPICAL EVERY 24 HOURS
Qty: 20 PATCH | Refills: 0 | Status: SHIPPED | OUTPATIENT
Start: 2024-05-07 | End: 2024-05-17

## 2024-05-07 RX ORDER — ALBUTEROL SULFATE 90 UG/1
1 AEROSOL, METERED RESPIRATORY (INHALATION) EVERY 4 HOURS PRN
Qty: 8.5 G | Refills: 0 | Status: SHIPPED | OUTPATIENT
Start: 2024-05-07

## 2024-05-07 RX ORDER — MAGNESIUM SULFATE HEPTAHYDRATE 40 MG/ML
2 INJECTION, SOLUTION INTRAVENOUS ONCE
Status: COMPLETED | OUTPATIENT
Start: 2024-05-07 | End: 2024-05-07

## 2024-05-07 RX ADMIN — MOMETASONE FUROATE AND FORMOTEROL FUMARATE DIHYDRATE 2 PUFF: 100; 5 AEROSOL RESPIRATORY (INHALATION) at 05:47

## 2024-05-07 RX ADMIN — ENOXAPARIN SODIUM 40 MG: 100 INJECTION SUBCUTANEOUS at 17:30

## 2024-05-07 RX ADMIN — MOMETASONE FUROATE AND FORMOTEROL FUMARATE DIHYDRATE 2 PUFF: 100; 5 AEROSOL RESPIRATORY (INHALATION) at 17:30

## 2024-05-07 RX ADMIN — TIOTROPIUM BROMIDE INHALATION SPRAY 5 MCG: 3.12 SPRAY, METERED RESPIRATORY (INHALATION) at 05:47

## 2024-05-07 RX ADMIN — QUETIAPINE FUMARATE 12.5 MG: 25 TABLET ORAL at 20:59

## 2024-05-07 RX ADMIN — MAGNESIUM SULFATE HEPTAHYDRATE 2 G: 2 INJECTION, SOLUTION INTRAVENOUS at 09:05

## 2024-05-07 ASSESSMENT — PAIN DESCRIPTION - PAIN TYPE
TYPE: ACUTE PAIN

## 2024-05-07 ASSESSMENT — ENCOUNTER SYMPTOMS
WHEEZING: 1
HEADACHES: 0
NAUSEA: 0
NERVOUS/ANXIOUS: 0
SHORTNESS OF BREATH: 1
BACK PAIN: 0
MYALGIAS: 0
COUGH: 1

## 2024-05-07 NOTE — CARE PLAN
The patient is Stable - Low risk of patient condition declining or worsening    Shift Goals  Clinical Goals: safety, remain fall free  Patient Goals: go home  Family Goals: PREETI    Progress made toward(s) clinical / shift goals:  Patient and family educated on plan of care and verbalized understanding. Patient has remained free of falls this shift with appropriate fall precautions in place thorughout shift. Patient skin integrity maintained throughout shift.   Pt educated multiple times this shift regarding fall risk and reminded to call for assistance when attempting to mobilize. Pt has remained free of falls this shift but has continued to demonstrate inability to call for assistance. Pt has also demonstrated lack of understanding on plan of care and is insisting on leaving. Pt re-educated PRN.    Problem: Fall Risk  Goal: Patient will remain free from falls  Outcome: Progressing     Patient is not progressing towards the following goals:      Problem: Knowledge Deficit - Standard  Goal: Patient and family/care givers will demonstrate understanding of plan of care, disease process/condition, diagnostic tests and medications  Outcome: Not Progressing

## 2024-05-07 NOTE — PROGRESS NOTES
Pt still get out of bed without calling for help nor use call bell, pt always insist on using the walker but this time urinal offered as he looked very unsteady. Pt has audible rhonchi, no wheezing increased respiratory effort also noted when ambulating, pt assisted back to bed, no complaints of pain, bed alarm on

## 2024-05-07 NOTE — PROGRESS NOTES
Pt dressed in their personal clothing and has packed their belongings in bag. Per pt, he has a car downstairs and can. Pt is alert and oriented x4. Expressed to patient that it is not safe for him to leave at this time, and the MD does not encourage leaving against medical advice. This RN contacted son to inform him of situation, unable to get a hold of daughter. MD notified at situation and MD at bedside explaining the risks of leaving. Pt is agitated, stating that they have been in here for 10 days and they are sick of it. Per son, pt does in fact not have a car here at the hospital. Pt escorted back to bed as pt demonstrates an unsteady weak gait. Pt waiting for daughter to arrive at bedside.

## 2024-05-07 NOTE — DISCHARGE PLANNING
Case Management Discharge Planning    Admission Date: 4/26/2024  GMLOS: 4.9  ALOS: 11    6-Clicks ADL Score: 15  6-Clicks Mobility Score: 12  PT and/or OT Eval ordered: Yes  Post-acute Referrals Ordered: Yes  Post-acute Choice Obtained: Yes  Has referral(s) been sent to post-acute provider:  Yes      Anticipated Discharge Dispo: Discharge Disposition: D/T to SNF with Medicare cert in anticipation of skilled care (03)    DME Needed: No    Action(s) Taken: OTHER    Discussed this case during Morning Huddle. Per MD, post acute placement is recommended.     SW reported, the skilled referrals declined. SW escalated to [facility] for [facility] SNF.    11:00am - Discussed this case during IDT Rounds. Per MD, patient is requesting to discharge home, NOK aware.    HHC Order received. Per RN Hattie, patient requesting to discharge home and is able to consent for HHC.    SW met with patient at bedside. Patient provided VC to send referrals to: [facility], [facility], [facility], [facility], and [facility].    [facility] faxed HHC choice form to [facility].    1:20pm - Per [facility], [facility] ACCEPTED.    Escalations Completed: None    Medically Clear: No    Next Steps: Home with HHC, pending acceptance.    Barriers to Discharge: None    Is the patient up for discharge tomorrow:

## 2024-05-07 NOTE — PROGRESS NOTES
Hospital Medicine Daily Progress Note    Date of Service  5/7/2024    Chief Complaint  Ady Blake is a 74 y.o. male admitted 4/26/2024 with fall    Hospital Course:        This is a 71 -year-old male with a past medical history significant for nicotine dependence, hypertension presented to the ER on 4/26/2024 after he had a fall.   yesterday that he has been drinking a beer every day and smoking half a pack of cigarettes.    He stated that he tripped while he was walking on the left side denies dizziness and lightheadedness denies any LOC, noted to have bruise all over body     CT lumbar, thoracic showed acute compression fracture of T10, CT abdomen showed rib fracture on the left side, seventh, eighth and ninth with diffuse colitis and severe fatty liver disease. He noted to have AST/ALT 1279/3460, ALP at 111, T-bili at 1.7; US abdomen showing fatty liver vs fibrosis.  His LFT has trended down significantly.    Due to stenosis recommended monitored with [facility] protocol for alcohol withdrawal which has been resolved.  Extensive counseling has been provided in regards to the adverse effect of smoking and drinking alcohol.    Upon presentation, he noted to be elevated.  Patient is a 7 days of antibiotics including Zosyn followed by Augmentin from 4/26.  5/2    WBC count has been trending down, today at 13.4, Mg 1.7 with IV magnesium.  Due to compression fracture, case discussed with  Who recommended brace,PT OT and following up as an outpatient.    Interval events:  -- Mild tachycardia with heart rate of 102, 6 blood pressure on the lower side, asymptomatic, saturating well on room air  -Magnesium at 1.6, will provide IV-Magnesium  -WBC trending down at 13.4, monitor hemoglobin 11.0  --Noted to have  macrocytosis, TSH normal, will obtain vitamin B12  -- phos at 3.4  Noted to be weak and debilitated, PT/OT has evaluated the patient medical postacute SNF referral in place, but patient has been adamant about  going home with HH, thus HH  has  been ordered      I have discussed this patient's plan of care and discharge plan at IDT rounds today with Case Management, Nursing, Nursing leadership, and other members of the IDT team.    Consultants/Specialty  None   Code Status  Full Code    Disposition  The patient is not medically cleared for discharge to home or a post-acute facility.  Anticipate discharge to: home with organized home healthcare and close outpatient follow-up    I have placed the appropriate orders for post-discharge needs.    Review of Systems  Review of Systems   HENT:  Negative for nosebleeds.    Respiratory:  Positive for cough, shortness of breath and wheezing.    Cardiovascular:  Negative for chest pain.   Gastrointestinal:  Negative for nausea.   Genitourinary:  Negative for dysuria.   Musculoskeletal:  Negative for back pain and myalgias.   Neurological:  Negative for headaches.   Psychiatric/Behavioral:  The patient is not nervous/anxious.    All other systems reviewed and are negative.       Physical Exam  Temp:  [36.1 °C (97 °F)-36.4 °C (97.5 °F)] 36.4 °C (97.5 °F)  Pulse:  [] 87  Resp:  [16-18] 16  BP: ()/(59-65) 106/59  SpO2:  [94 %-97 %] 97 %    Physical Exam  Vitals and nursing note reviewed.   Constitutional:       General: He is not in acute distress.     Comments: Cachectic appearance   Eyes:      Extraocular Movements: Extraocular movements intact.      Pupils: Pupils are equal, round, and reactive to light.   Cardiovascular:      Rate and Rhythm: Normal rate.   Pulmonary:      Effort: No respiratory distress.      Breath sounds: Wheezing and rales present.   Abdominal:      General: There is no distension.      Tenderness: There is no abdominal tenderness.   Musculoskeletal:      Cervical back: Neck supple.      Right lower leg: No edema.      Left lower leg: No edema.   Skin:     Findings: Bruising and lesion present. No rash.   Neurological:      Mental Status: He is alert.  He is disoriented.      Cranial Nerves: No cranial nerve deficit.      Motor: No weakness.         Fluids    Intake/Output Summary (Last 24 hours) at 5/7/2024 1647  Last data filed at 5/7/2024 1000  Gross per 24 hour   Intake 110 ml   Output --   Net 110 ml       Laboratory  Recent Labs     05/05/24  0202 05/06/24  0135 05/07/24  0108   WBC 22.7* 18.5* 13.4*   RBC 3.48* 3.58* 3.36*   HEMOGLOBIN 11.6* 11.7* 11.0*   HEMATOCRIT 34.1* 35.4* 33.4*   MCV 98.0* 98.9* 99.4*   MCH 33.3* 32.7 32.7   MCHC 34.0 33.1 32.9   RDW 51.6* 50.6* 50.3*   PLATELETCT 287 312 296   MPV 10.8 10.6 10.4     Recent Labs     05/05/24  0202 05/06/24  0135   SODIUM 130* 131*   POTASSIUM 4.4 4.0   CHLORIDE 97 97   CO2 19* 19*   GLUCOSE 103* 96   BUN 16 16   CREATININE 0.81 0.93   CALCIUM 8.4* 8.9                     Imaging  MR-BRAIN-W/O   Final Result      1.  No acute abnormality.   2.  Moderate cerebral volume loss.      DX-CHEST-LIMITED (1 VIEW)   Final Result         1.  No acute cardiopulmonary disease.   2.  Atherosclerosis      CT-HEAD W/O   Final Result      There is no definite acute intracranial abnormality.         EC-ECHOCARDIOGRAM COMPLETE W/O CONT   Final Result      DX-CHEST-LIMITED (1 VIEW)   Final Result      No acute process.      US-RUQ   Final Result         1.  Dilatation the right renal pelvis, appearance most compatible with mild hydronephrosis.   2.  Echogenic liver, compatible with fatty change versus fibrosis.   3.  Atherosclerosis      CT-LSPINE W/O PLUS RECONS   Final Result      Mild multilevel degenerative disease without definite fracture.      CT-TSPINE W/O PLUS RECONS   Final Result      1.  Acute superior endplate compression fracture of the T10 vertebral body with moderate height loss.      CT-CHEST,ABDOMEN,PELVIS WITH   Final Result      1.  Acute left lateral seventh, eighth, ninth rib fractures.   2.  Moderate superior endplate compression fracture of the T10 vertebral body which appears acute with fracture  lines visible.   3.  Ileum enteritis.   4.  Diffuse colitis.   5.  Severe fatty liver infiltration.      DX-CHEST-PORTABLE (1 VIEW)   Final Result      No acute cardiac or pulmonary abnormalities are identified.           Assessment/Plan  * Acute respiratory failure with hypoxia (HCC)- (present on admission)  Assessment & Plan  Patient is on room air  Likely related to COPD  Inhalers    Metabolic encephalopathy  Assessment & Plan  Patient has delirium, multi factors  Likely related to alcoholism, infection, dehydration and hospitalization  CT head did not show any acute finding  Nonpharmacological treatment  Schedule Seroquel at night  Avoid aggressive sedation and avoid antihistamine    On 5/7: improving     Rib fracture  Assessment & Plan  Due to falls  No pneumothorax  Pain control  Respiratory hygiene, IS    Debility  Assessment & Plan  Patient has significant generalized weakness with debility and recurrent falls  PT and OT  recs post-acute  TSH is normal, would likely patient needs placement    Closed fracture of tenth thoracic vertebra (HCC)  Assessment & Plan  From fall  Case was discussed with Dr. Xie neurosurgeon, no intervention needed, brace  Pain control  PT and OT recs post-acute, snf referral in place    Leukocytosis  Assessment & Plan  Trending down at 13s    Metabolic acidosis  Assessment & Plan  Resolved    AVA (acute kidney injury) (HCC)  Assessment & Plan  With creatinine 1.6   -- Resolved , creatinine at 0.93    Elevated liver enzymes  Assessment & Plan  Severely elevated enzymes around thousands    --Ultrasound did not show any acute finding except fatty liver  Improving likely related to alcoholic hepatitis      Malnutrition (HCC)  Assessment & Plan  Cachectic appearance  Nutrition consult  Encourage the patient to quit drinking alcohol and smoking    Fall  Assessment & Plan  Patient has cachectic appearance, multiple bruises on his body  Patient using cane at home, lives by  himself      COPD (chronic obstructive pulmonary disease) (HCC)- (present on admission)  Assessment & Plan  No significant exacerbation  Chest x-ray did not show any signs of infiltrate or or pneumonia  Continue inhalers    -- Now saturating on RA    Alcohol abuse- (present on admission)  Assessment & Plan  Heavy drinking for years  WA protocol  High-dose of thiamine and multivitamins  Encouraged the patient to quit    HTN (hypertension)- (present on admission)  Assessment & Plan  Holding blood pressure   Avoid aggressive treatment    Tobacco dependence- (present on admission)  Assessment & Plan  Smoking cessation  Nicotine patch      VTE prophylaxis: Lovenox

## 2024-05-07 NOTE — DISCHARGE PLANNING
ATTN: Case Management  RE: Referral for Home Health    Reason for referral denial: [facility] is not in network with patient insurance              Unfortunately, we are not able to accept this referral for the reason listed above. If further clarity is needed, our Transitional Care Specialists are available to discuss any barriers to service at [phone].      We look forward to collaborating with you in the future,  [facility] Team

## 2024-05-07 NOTE — CARE PLAN
The patient is Stable - Low risk of patient condition declining or worsening    Shift Goals  Clinical Goals: rremain free from falls  Patient Goals: go home  Family Goals: PREETI    Progress made toward(s) clinical / shift goals:   PT ROUNDED  hourly bed alarm kept on at all times, pt was never left unattended in bathroom, remained free from fall    Patient is not progressing towards the following goals:

## 2024-05-07 NOTE — DISCHARGE PLANNING
Referral sent to [facility]     1308- Per chart note, [facility] declined  1309- Referral sent to [facility]     1320- Per epic link, [facility]  accepted

## 2024-05-08 ENCOUNTER — PHARMACY VISIT (OUTPATIENT)
Dept: PHARMACY | Facility: MEDICAL CENTER | Age: 74
End: 2024-05-08
Payer: COMMERCIAL

## 2024-05-08 VITALS
SYSTOLIC BLOOD PRESSURE: 116 MMHG | HEART RATE: 93 BPM | TEMPERATURE: 97.7 F | OXYGEN SATURATION: 95 % | HEIGHT: 65 IN | RESPIRATION RATE: 18 BRPM | BODY MASS INDEX: 19.39 KG/M2 | WEIGHT: 116.4 LBS | DIASTOLIC BLOOD PRESSURE: 58 MMHG

## 2024-05-08 LAB
ALBUMIN SERPL BCP-MCNC: 3.1 G/DL (ref 3.2–4.9)
ALBUMIN/GLOB SERPL: 1.1 G/DL
ALP SERPL-CCNC: 100 U/L (ref 30–99)
ALT SERPL-CCNC: 70 U/L (ref 2–50)
ANION GAP SERPL CALC-SCNC: 11 MMOL/L (ref 7–16)
AST SERPL-CCNC: 44 U/L (ref 12–45)
BILIRUB SERPL-MCNC: 0.8 MG/DL (ref 0.1–1.5)
BUN SERPL-MCNC: 13 MG/DL (ref 8–22)
CALCIUM ALBUM COR SERPL-MCNC: 9.4 MG/DL (ref 8.5–10.5)
CALCIUM SERPL-MCNC: 8.7 MG/DL (ref 8.5–10.5)
CHLORIDE SERPL-SCNC: 102 MMOL/L (ref 96–112)
CO2 SERPL-SCNC: 19 MMOL/L (ref 20–33)
CREAT SERPL-MCNC: 0.65 MG/DL (ref 0.5–1.4)
ERYTHROCYTE [DISTWIDTH] IN BLOOD BY AUTOMATED COUNT: 50.3 FL (ref 35.9–50)
GFR SERPLBLD CREATININE-BSD FMLA CKD-EPI: 99 ML/MIN/1.73 M 2
GLOBULIN SER CALC-MCNC: 2.7 G/DL (ref 1.9–3.5)
GLUCOSE SERPL-MCNC: 95 MG/DL (ref 65–99)
HCT VFR BLD AUTO: 32.7 % (ref 42–52)
HGB BLD-MCNC: 10.8 G/DL (ref 14–18)
MAGNESIUM SERPL-MCNC: 1.7 MG/DL (ref 1.5–2.5)
MCH RBC QN AUTO: 32.1 PG (ref 27–33)
MCHC RBC AUTO-ENTMCNC: 33 G/DL (ref 32.3–36.5)
MCV RBC AUTO: 97.3 FL (ref 81.4–97.8)
PHOSPHATE SERPL-MCNC: 3.6 MG/DL (ref 2.5–4.5)
PLATELET # BLD AUTO: 308 K/UL (ref 164–446)
PMV BLD AUTO: 9.8 FL (ref 9–12.9)
POTASSIUM SERPL-SCNC: 4.2 MMOL/L (ref 3.6–5.5)
PROCALCITONIN SERPL-MCNC: 0.39 NG/ML
PROT SERPL-MCNC: 5.8 G/DL (ref 6–8.2)
RBC # BLD AUTO: 3.36 M/UL (ref 4.7–6.1)
SODIUM SERPL-SCNC: 132 MMOL/L (ref 135–145)
WBC # BLD AUTO: 12.4 K/UL (ref 4.8–10.8)

## 2024-05-08 PROCEDURE — 700111 HCHG RX REV CODE 636 W/ 250 OVERRIDE (IP): Mod: JZ | Performed by: HOSPITALIST

## 2024-05-08 PROCEDURE — A9270 NON-COVERED ITEM OR SERVICE: HCPCS | Performed by: INTERNAL MEDICINE

## 2024-05-08 PROCEDURE — 36415 COLL VENOUS BLD VENIPUNCTURE: CPT

## 2024-05-08 PROCEDURE — 84100 ASSAY OF PHOSPHORUS: CPT

## 2024-05-08 PROCEDURE — 84145 PROCALCITONIN (PCT): CPT

## 2024-05-08 PROCEDURE — 99239 HOSP IP/OBS DSCHRG MGMT >30: CPT | Performed by: HOSPITALIST

## 2024-05-08 PROCEDURE — 80053 COMPREHEN METABOLIC PANEL: CPT

## 2024-05-08 PROCEDURE — 85027 COMPLETE CBC AUTOMATED: CPT

## 2024-05-08 PROCEDURE — 83735 ASSAY OF MAGNESIUM: CPT

## 2024-05-08 PROCEDURE — 700102 HCHG RX REV CODE 250 W/ 637 OVERRIDE(OP): Performed by: INTERNAL MEDICINE

## 2024-05-08 RX ORDER — MAGNESIUM SULFATE HEPTAHYDRATE 40 MG/ML
2 INJECTION, SOLUTION INTRAVENOUS ONCE
Status: COMPLETED | OUTPATIENT
Start: 2024-05-08 | End: 2024-05-08

## 2024-05-08 RX ORDER — MAGNESIUM SULFATE HEPTAHYDRATE 40 MG/ML
2 INJECTION, SOLUTION INTRAVENOUS ONCE
Status: DISCONTINUED | OUTPATIENT
Start: 2024-05-08 | End: 2024-05-08

## 2024-05-08 RX ADMIN — MAGNESIUM SULFATE HEPTAHYDRATE 2 G: 2 INJECTION, SOLUTION INTRAVENOUS at 07:59

## 2024-05-08 RX ADMIN — TIOTROPIUM BROMIDE INHALATION SPRAY 5 MCG: 3.12 SPRAY, METERED RESPIRATORY (INHALATION) at 04:59

## 2024-05-08 RX ADMIN — OMEPRAZOLE 20 MG: 20 CAPSULE, DELAYED RELEASE ORAL at 04:58

## 2024-05-08 RX ADMIN — MOMETASONE FUROATE AND FORMOTEROL FUMARATE DIHYDRATE 2 PUFF: 100; 5 AEROSOL RESPIRATORY (INHALATION) at 04:59

## 2024-05-08 RX ADMIN — AMLODIPINE BESYLATE 5 MG: 5 TABLET ORAL at 04:59

## 2024-05-08 ASSESSMENT — PAIN DESCRIPTION - PAIN TYPE
TYPE: ACUTE PAIN

## 2024-05-08 NOTE — DISCHARGE SUMMARY
Discharge Summary    CHIEF COMPLAINT ON ADMISSION  Chief Complaint   Patient presents with    T-5000 GLF    Shortness of Breath    Rib Pain       Reason for Admission  ems     Admission Date  4/26/2024    CODE STATUS  Full Code    HPI & HOSPITAL COURSE      This is a 71 -year-old male with a past medical history significant for nicotine dependence, hypertension presented to the ER on 4/26/2024 after he had a fall. He stated that he has been drinking a beer every day and smoking half a pack of cigarettes.    He stated that he tripped while he was walking on the left side denies dizziness and lightheadedness denies any LOC, noted to have bruise all over body.    CT lumbar, thoracic showed acute compression fracture of T10, CT abdomen showed rib fracture on the left side, seventh, eighth and ninth with diffuse colitis and severe fatty liver disease. He noted to have AST/ALT 1279/3460, ALP at 111, T-bili at 1.7; US abdomen showing fatty liver vs fibrosis.  His LFT has trended down significantly.    During the stay, he continued to be monitored with [facility] protocol for alcohol withdrawal which has been resolved.  Extensive counseling has been provided in regards to the adverse effect of smoking and drinking alcohol.    Upon presentation, his procal was elevated, Patient is a 7 days of antibiotics including Zosyn followed by Augmentin from 4/26.- 5/2    WBC count has been trending down, today at 12.4 patient is alert and oriented, answering questions.  Regarding his compression fracture, the case has been discussed with Dr. Xie who recommended no intervention, brace PT/OT.  PT/OT has evaluate the patient, recommend postacute, patient is adamant about going home with home health.  [place] has been arranged.  At this time patient is medically stable to be discharged home with home health.    For all other chronic medical condition, he will resume his home medication.    Therefore, he is discharged in good and stable  condition to home with organized home healthcare and close outpatient follow-up.    The patient met 2-midnight criteria for an inpatient stay at the time of discharge.    Discharge Date  5/8/2024      FOLLOW UP ITEMS POST DISCHARGE  Mark Benito P.A.-C.  Please follow up with dr Xie as an op     DISCHARGE DIAGNOSES  Principal Problem:    Acute respiratory failure with hypoxia (HCC) (POA: Yes)  Active Problems:    HTN (hypertension) (POA: Yes)    Alcohol abuse (POA: Yes)    COPD (chronic obstructive pulmonary disease) (HCC) (POA: Yes)    Fall (POA: Unknown)    Malnutrition (HCC) (POA: Unknown)    Elevated liver enzymes (POA: Unknown)    AVA (acute kidney injury) (HCC) (POA: Unknown)    Metabolic acidosis (POA: Unknown)    Leukocytosis (POA: Unknown)    Closed fracture of tenth thoracic vertebra (HCC) (POA: Unknown)    Debility (POA: Unknown)    Rib fracture (POA: Unknown)    Metabolic encephalopathy (POA: Unknown)    Tobacco dependence (POA: Yes)  Resolved Problems:    ACP (advance care planning) (POA: Unknown)      FOLLOW UP  No future appointments.  Mark Benito P.A.-C.  [address] [address]  [facility] [id number] [phone]    Follow up  Please call your primary care provider to schedule a hospital follow up. Thank you.    [facility] [address] [id number] [phone]          MEDICATIONS ON DISCHARGE     Medication List        START taking these medications        Instructions   lidocaine 4 % Ptch  Commonly known as: Asperflex   Place 2 Patches on the skin every 24 hours for 10 days.  Dose: 2 Patch     omeprazole 20 MG delayed-release capsule  Commonly known as: PriLOSEC   Take 1 Capsule by mouth every day.  Dose: 20 mg     oxyCODONE immediate-release 5 MG Tabs  Commonly known as: Roxicodone   Take 1 Tablet by mouth every 8 hours as needed for Severe Pain for up to 5 days.  Dose: 5 mg     QUEtiapine 25 MG Tabs  Commonly known as: SEROquel   Take 0.5 Tablets by  mouth every evening for 30 days.  Dose: 12.5 mg     thiamine 100 MG tablet  Commonly known as: Thiamine   Take 1 Tablet by mouth every day.  Dose: 100 mg     tiotropium 2.5 mcg/Act Aers  Commonly known as: Spiriva Respimat   Inhale 2 Inhalations every day for 30 days.  Dose: 5 mcg            CHANGE how you take these medications        Instructions   Symbicort 160-4.5 MCG/ACT Aero  What changed: how much to take  Generic drug: budesonide-formoterol   Inhale 2 Puffs 2 times a day for 30 days.  Dose: 2 Puff            CONTINUE taking these medications        Instructions   albuterol 108 (90 Base) MCG/ACT Aers inhalation aerosol   Inhale 1 Puff every four hours as needed for Shortness of Breath.  Dose: 1 Puff     amLODIPine 5 MG Tabs  Commonly known as: Norvasc   Take 1 Tablet by mouth every day.  Dose: 5 mg            STOP taking these medications      acetaminophen 500 MG Tabs  Commonly known as: Tylenol     losartan 100 MG Tabs  Commonly known as: Cozaar              Allergies  No Known Allergies    DIET  Orders Placed This Encounter   Procedures    Diet Order Diet: Regular     Standing Status:   Standing     Number of Occurrences:   1     Order Specific Question:   Diet:     Answer:   Regular [1]       ACTIVITY  As tolerated.  Weight bearing as tolerated    CONSULTATIONS  Pulm    PROCEDURES  None     LABORATORY  Lab Results   Component Value Date    SODIUM 132 (L) 05/08/2024    POTASSIUM 4.2 05/08/2024    CHLORIDE 102 05/08/2024    CO2 19 (L) 05/08/2024    GLUCOSE 95 05/08/2024    BUN 13 05/08/2024    CREATININE 0.65 05/08/2024    CREATININE 0.9 01/01/2007        Lab Results   Component Value Date    WBC 12.4 (H) 05/08/2024    HEMOGLOBIN 10.8 (L) 05/08/2024    HEMATOCRIT 32.7 (L) 05/08/2024    PLATELETCT 308 05/08/2024        Total time of the discharge process exceeds 39 minutes.

## 2024-05-08 NOTE — CARE PLAN
The patient is Stable - Low risk of patient condition declining or worsening    Shift Goals  Clinical Goals: safety, patient will remain free from falls  Patient Goals: go home  Family Goals: PREETI    Progress made toward(s) clinical / shift goals:      Problem: Knowledge Deficit - Standard  Goal: Patient and family/care givers will demonstrate understanding of plan of care, disease process/condition, diagnostic tests and medications  Outcome: Progressing     Problem: Pain - Standard  Goal: Alleviation of pain or a reduction in pain to the patient’s comfort goal  Outcome: Progressing     Patient has call light within reach and calls appropriately. He has been updated on plan of care and will continue to be updated as information arises. He has not reported any pain this shift. Bed is locked and in lowest position, bed alarm on. All needs met at this time.

## 2024-05-08 NOTE — DISCHARGE PLANNING
Case Management Discharge Planning    Admission Date: 4/26/2024  GMLOS: 4.9  ALOS: 12    6-Clicks ADL Score: 15  6-Clicks Mobility Score: 12    Anticipated Discharge Dispo: Discharge Disposition: D/T to home under [facility] care in anticipation of covered skilled care (06)    DME Needed: No    Action(s) Taken:   Pt discussed during afternoon IDT rounds. Per MD, pt is medically clear to discharge home with [facility] today.     CM RN contacted pt's daughter Althea ([phone]) who stated she will provide pt with transportation home at today at 1600.   Bedside RN informed.     Escalations Completed: None    Medically Clear: Yes    Next Steps:  CM RN to follow up with medical team to discuss discharge barriers or needs.     Barriers to Discharge: None

## 2024-05-08 NOTE — DISCHARGE INSTRUCTIONS
Discharge Instructions    Discharged to home by car with relative. Discharged via wheelchair, hospital escort: Yes.  Special equipment needed: Walker (Per daughter patient already has one at home)    Be sure to schedule a follow-up appointment with your primary care doctor or any specialists as instructed.     Discharge Plan:   Diet Plan: Discussed  Activity Level: Discussed  Confirmed Follow up Appointment: Patient to Call and Schedule Appointment  Confirmed Symptoms Management: Discussed  Medication Reconciliation Updated: Yes    I understand that a diet low in cholesterol, fat, and sodium is recommended for good health. Unless I have been given specific instructions below for another diet, I accept this instruction as my diet prescription.   Other diet: Regular    Special Instructions: None    -Is this patient being discharged with medication to prevent blood clots?  No    Is patient discharged on Warfarin / Coumadin?   No

## 2024-05-08 NOTE — DISCHARGE PLANNING
Spoke To: Kim  Agency/Facility Name: [facility]  Plan or Request: Aware pt may d/c today. Contact phone number for daughter provided.

## 2024-05-08 NOTE — DISCHARGE PLANNING
Case Management Discharge Planning    Admission Date: 4/26/2024  GMLOS: 4.9  ALOS: 12    6-Clicks ADL Score: 15  6-Clicks Mobility Score: 12  PT and/or OT Eval ordered: Yes  Post-acute Referrals Ordered: Yes  Post-acute Choice Obtained: Yes  Has referral(s) been sent to post-acute provider:  Yes      Anticipated Discharge Dispo: Discharge Disposition: D/T to home under [facility] care in anticipation of covered skilled care (06)    DME Needed: No    Action(s) Taken: OTHER    SW spoke with Althea, patient's daughter via phone [phone]. Althea indicates she agrees with patient discharging to his home with [facility].    Althea indicates she will  patient from the hospital and transport him home when medically clear, RN     Escalations Completed: None    Medically Clear: No    Next Steps: Home when medically clear.    Barriers to Discharge: Medical clearance    Is the patient up for discharge tomorrow:

## 2024-05-08 NOTE — PROGRESS NOTES
Patient discharges with daughter. Dc Instructions discussed with patient and daughter.  Escort offered. Patient went to the restroom. Patient has all belongings with him. Patient declining care such as lidocaine patch, nebulizer treatment, and albuterol. Patient was offered therapy to help them utilize FWW, patient denied therapy. Meds to beds with patient on discharge

## 2024-05-09 ENCOUNTER — DOCUMENTATION (OUTPATIENT)
Dept: VASCULAR LAB | Facility: MEDICAL CENTER | Age: 74
End: 2024-05-09
Payer: MEDICARE

## 2024-05-09 NOTE — PROGRESS NOTES
Received smoking cessation referral.    We are unable to establish with the patient because their insurance is out of network with [facility].    Will 'close' referral.    Misti Borrego, PharmD    CC Dr Miranda

## 2024-06-04 NOTE — DOCUMENTATION QUERY
[facility]                                                                       Query Response Note      PATIENT:               LINDSAY HILLS  ACCT #:                  [id number]  MRN:                     [id number]  :                      1950  ADMIT DATE:       2024 11:14 AM  DISCH DATE:        2024 4:24 PM  RESPONDING  PROVIDER #:        [id number]           QUERY TEXT:    There is conflicting documentation with regards to the presence of Pneumonia.     Possible aspiration pneumonia was documented in progress notes dated -; and  CXR with no signs of infiltrate or pneumonia was documented in H&P and progress notes dated -.   Patient received Zosyn IV from - and Augmentin on -.     Please clarify the status of the condition.    The patient's Clinical Indicators include:  H&P: Leukocytosis likely related to stress. No signs of infection or fever. Chest x-ray showed signs of COPD however no signs of pneumonia.   HM: Some worsening on his leukocytosis, de-escalate antibiotics to Unasyn.   HM: Leukocytosis likely related to prednisone   HM: Afebrile, still confused, finished steroid course, today, WBC up to 20 possible related to steroid use   HM: Patient had leukocytosis of 22,000 improved from yesterday... clinically improved    Clinical Findings:   - Radiology Findings:          - Initial CXR : No acute cardiac or pulmonary abnormalities are identified.         - CTAP: No nodules or airspace process. Mild emphysema. No pleural effusion.         - CXR : No acute process.         - CXR : No acute cardiopulmonary disease.  - On presentation: WBC 17.4, procal 70.90, pt reported worsened cough per ED note   - No dysphagia or risk of aspiration per SLP     Risk Factors: COPD  Treatment: Zosyn IV (-), Augmentin (-)    Thank you for your time and attention,  Cecilia  OSMAN Luevano RN  Available via Voalte  Options provided:   -- Pneumonia ruled in   -- Pneumonia ruled out   -- Other explanation, (please specify other explanation)      Query created by: Cecilia Luevano on 5/14/2024 4:06 AM    RESPONSE TEXT:    Pneumonia ruled out          Electronically signed by:  CHRISTOFER STEPHENS MD 6/4/2024 12:49 PM
